# Patient Record
Sex: MALE | Race: WHITE | NOT HISPANIC OR LATINO
[De-identification: names, ages, dates, MRNs, and addresses within clinical notes are randomized per-mention and may not be internally consistent; named-entity substitution may affect disease eponyms.]

---

## 2017-03-23 ENCOUNTER — APPOINTMENT (OUTPATIENT)
Dept: CARDIOLOGY | Facility: CLINIC | Age: 72
End: 2017-03-23

## 2017-03-23 ENCOUNTER — NON-APPOINTMENT (OUTPATIENT)
Age: 72
End: 2017-03-23

## 2017-03-23 VITALS
HEIGHT: 73 IN | DIASTOLIC BLOOD PRESSURE: 92 MMHG | BODY MASS INDEX: 34.06 KG/M2 | SYSTOLIC BLOOD PRESSURE: 132 MMHG | WEIGHT: 257 LBS | HEART RATE: 65 BPM | OXYGEN SATURATION: 96 %

## 2017-06-26 ENCOUNTER — RX RENEWAL (OUTPATIENT)
Age: 72
End: 2017-06-26

## 2017-09-14 ENCOUNTER — NON-APPOINTMENT (OUTPATIENT)
Age: 72
End: 2017-09-14

## 2017-09-14 ENCOUNTER — APPOINTMENT (OUTPATIENT)
Dept: CARDIOLOGY | Facility: CLINIC | Age: 72
End: 2017-09-14
Payer: MEDICARE

## 2017-09-14 VITALS
BODY MASS INDEX: 33.8 KG/M2 | HEART RATE: 72 BPM | HEIGHT: 73 IN | RESPIRATION RATE: 14 BRPM | WEIGHT: 255 LBS | SYSTOLIC BLOOD PRESSURE: 143 MMHG | DIASTOLIC BLOOD PRESSURE: 96 MMHG | OXYGEN SATURATION: 97 %

## 2017-09-14 VITALS — DIASTOLIC BLOOD PRESSURE: 83 MMHG | SYSTOLIC BLOOD PRESSURE: 132 MMHG

## 2017-09-14 PROCEDURE — 93000 ELECTROCARDIOGRAM COMPLETE: CPT

## 2017-09-14 PROCEDURE — 99214 OFFICE O/P EST MOD 30 MIN: CPT

## 2018-01-12 ENCOUNTER — OUTPATIENT (OUTPATIENT)
Dept: OUTPATIENT SERVICES | Facility: HOSPITAL | Age: 73
LOS: 1 days | End: 2018-01-12

## 2018-01-12 ENCOUNTER — APPOINTMENT (OUTPATIENT)
Dept: CV DIAGNOSTICS | Facility: HOSPITAL | Age: 73
End: 2018-01-12
Payer: MEDICARE

## 2018-01-12 DIAGNOSIS — I25.5 ISCHEMIC CARDIOMYOPATHY: ICD-10-CM

## 2018-01-12 DIAGNOSIS — I25.10 ATHEROSCLEROTIC HEART DISEASE OF NATIVE CORONARY ARTERY WITHOUT ANGINA PECTORIS: ICD-10-CM

## 2018-01-12 PROCEDURE — 93016 CV STRESS TEST SUPVJ ONLY: CPT | Mod: GC

## 2018-01-12 PROCEDURE — 93018 CV STRESS TEST I&R ONLY: CPT | Mod: GC

## 2018-01-12 PROCEDURE — 78452 HT MUSCLE IMAGE SPECT MULT: CPT | Mod: 26

## 2018-03-15 ENCOUNTER — APPOINTMENT (OUTPATIENT)
Dept: CARDIOLOGY | Facility: CLINIC | Age: 73
End: 2018-03-15

## 2018-08-20 ENCOUNTER — RX RENEWAL (OUTPATIENT)
Age: 73
End: 2018-08-20

## 2018-12-03 ENCOUNTER — INPATIENT (INPATIENT)
Facility: HOSPITAL | Age: 73
LOS: 0 days | Discharge: LEFT BEFORE DISCHARGE | DRG: 188 | End: 2018-12-04
Attending: INTERNAL MEDICINE | Admitting: INTERNAL MEDICINE
Payer: MEDICARE

## 2018-12-03 VITALS
OXYGEN SATURATION: 94 % | DIASTOLIC BLOOD PRESSURE: 86 MMHG | WEIGHT: 244.93 LBS | RESPIRATION RATE: 20 BRPM | HEART RATE: 92 BPM | TEMPERATURE: 99 F | SYSTOLIC BLOOD PRESSURE: 151 MMHG

## 2018-12-03 DIAGNOSIS — Z90.49 ACQUIRED ABSENCE OF OTHER SPECIFIED PARTS OF DIGESTIVE TRACT: Chronic | ICD-10-CM

## 2018-12-03 LAB
ALBUMIN SERPL ELPH-MCNC: 4.2 G/DL — SIGNIFICANT CHANGE UP (ref 3.3–5)
ALP SERPL-CCNC: 81 U/L — SIGNIFICANT CHANGE UP (ref 40–120)
ALT FLD-CCNC: 36 U/L — SIGNIFICANT CHANGE UP (ref 10–45)
ANION GAP SERPL CALC-SCNC: 15 MMOL/L — SIGNIFICANT CHANGE UP (ref 5–17)
APTT BLD: 27.1 SEC — LOW (ref 27.5–36.3)
AST SERPL-CCNC: 22 U/L — SIGNIFICANT CHANGE UP (ref 10–40)
BASOPHILS # BLD AUTO: 0 K/UL — SIGNIFICANT CHANGE UP (ref 0–0.2)
BASOPHILS NFR BLD AUTO: 0.1 % — SIGNIFICANT CHANGE UP (ref 0–2)
BILIRUB SERPL-MCNC: 1.1 MG/DL — SIGNIFICANT CHANGE UP (ref 0.2–1.2)
BUN SERPL-MCNC: 13 MG/DL — SIGNIFICANT CHANGE UP (ref 7–23)
CALCIUM SERPL-MCNC: 9.9 MG/DL — SIGNIFICANT CHANGE UP (ref 8.4–10.5)
CHLORIDE SERPL-SCNC: 100 MMOL/L — SIGNIFICANT CHANGE UP (ref 96–108)
CO2 SERPL-SCNC: 23 MMOL/L — SIGNIFICANT CHANGE UP (ref 22–31)
CREAT SERPL-MCNC: 0.79 MG/DL — SIGNIFICANT CHANGE UP (ref 0.5–1.3)
EOSINOPHIL # BLD AUTO: 0 K/UL — SIGNIFICANT CHANGE UP (ref 0–0.5)
EOSINOPHIL NFR BLD AUTO: 0.3 % — SIGNIFICANT CHANGE UP (ref 0–6)
GLUCOSE SERPL-MCNC: 125 MG/DL — HIGH (ref 70–99)
HCT VFR BLD CALC: 45 % — SIGNIFICANT CHANGE UP (ref 39–50)
HGB BLD-MCNC: 15.1 G/DL — SIGNIFICANT CHANGE UP (ref 13–17)
INR BLD: 1.19 RATIO — HIGH (ref 0.88–1.16)
LYMPHOCYTES # BLD AUTO: 0.7 K/UL — LOW (ref 1–3.3)
LYMPHOCYTES # BLD AUTO: 5.4 % — LOW (ref 13–44)
MCHC RBC-ENTMCNC: 29.7 PG — SIGNIFICANT CHANGE UP (ref 27–34)
MCHC RBC-ENTMCNC: 33.6 GM/DL — SIGNIFICANT CHANGE UP (ref 32–36)
MCV RBC AUTO: 88.3 FL — SIGNIFICANT CHANGE UP (ref 80–100)
MONOCYTES # BLD AUTO: 0.9 K/UL — SIGNIFICANT CHANGE UP (ref 0–0.9)
MONOCYTES NFR BLD AUTO: 7.2 % — SIGNIFICANT CHANGE UP (ref 2–14)
NEUTROPHILS # BLD AUTO: 11.5 K/UL — HIGH (ref 1.8–7.4)
NEUTROPHILS NFR BLD AUTO: 87 % — HIGH (ref 43–77)
PLATELET # BLD AUTO: 234 K/UL — SIGNIFICANT CHANGE UP (ref 150–400)
POTASSIUM SERPL-MCNC: 3.9 MMOL/L — SIGNIFICANT CHANGE UP (ref 3.5–5.3)
POTASSIUM SERPL-SCNC: 3.9 MMOL/L — SIGNIFICANT CHANGE UP (ref 3.5–5.3)
PROT SERPL-MCNC: 7.8 G/DL — SIGNIFICANT CHANGE UP (ref 6–8.3)
PROTHROM AB SERPL-ACNC: 13.8 SEC — HIGH (ref 10–12.9)
RBC # BLD: 5.1 M/UL — SIGNIFICANT CHANGE UP (ref 4.2–5.8)
RBC # FLD: 12.9 % — SIGNIFICANT CHANGE UP (ref 10.3–14.5)
SODIUM SERPL-SCNC: 138 MMOL/L — SIGNIFICANT CHANGE UP (ref 135–145)
TROPONIN T, HIGH SENSITIVITY RESULT: 7 NG/L — SIGNIFICANT CHANGE UP (ref 0–51)
WBC # BLD: 13.2 K/UL — HIGH (ref 3.8–10.5)
WBC # FLD AUTO: 13.2 K/UL — HIGH (ref 3.8–10.5)

## 2018-12-03 PROCEDURE — 71045 X-RAY EXAM CHEST 1 VIEW: CPT | Mod: 26

## 2018-12-03 PROCEDURE — 93010 ELECTROCARDIOGRAM REPORT: CPT | Mod: GC

## 2018-12-03 PROCEDURE — 99284 EMERGENCY DEPT VISIT MOD MDM: CPT | Mod: GC,25

## 2018-12-03 PROCEDURE — 71275 CT ANGIOGRAPHY CHEST: CPT | Mod: 26

## 2018-12-03 RX ORDER — MORPHINE SULFATE 50 MG/1
4 CAPSULE, EXTENDED RELEASE ORAL ONCE
Qty: 0 | Refills: 0 | Status: DISCONTINUED | OUTPATIENT
Start: 2018-12-03 | End: 2018-12-03

## 2018-12-03 RX ORDER — DIAZEPAM 5 MG
5 TABLET ORAL ONCE
Qty: 0 | Refills: 0 | Status: DISCONTINUED | OUTPATIENT
Start: 2018-12-03 | End: 2018-12-03

## 2018-12-03 RX ORDER — OXYCODONE HYDROCHLORIDE 5 MG/1
5 TABLET ORAL ONCE
Qty: 0 | Refills: 0 | Status: DISCONTINUED | OUTPATIENT
Start: 2018-12-03 | End: 2018-12-03

## 2018-12-03 RX ORDER — KETOROLAC TROMETHAMINE 30 MG/ML
15 SYRINGE (ML) INJECTION ONCE
Qty: 0 | Refills: 0 | Status: DISCONTINUED | OUTPATIENT
Start: 2018-12-03 | End: 2018-12-03

## 2018-12-03 RX ADMIN — OXYCODONE HYDROCHLORIDE 5 MILLIGRAM(S): 5 TABLET ORAL at 20:09

## 2018-12-03 RX ADMIN — OXYCODONE HYDROCHLORIDE 5 MILLIGRAM(S): 5 TABLET ORAL at 21:22

## 2018-12-03 RX ADMIN — Medication 5 MILLIGRAM(S): at 17:47

## 2018-12-03 RX ADMIN — MORPHINE SULFATE 4 MILLIGRAM(S): 50 CAPSULE, EXTENDED RELEASE ORAL at 22:30

## 2018-12-03 RX ADMIN — MORPHINE SULFATE 4 MILLIGRAM(S): 50 CAPSULE, EXTENDED RELEASE ORAL at 21:25

## 2018-12-03 RX ADMIN — Medication 15 MILLIGRAM(S): at 22:02

## 2018-12-03 RX ADMIN — Medication 15 MILLIGRAM(S): at 22:30

## 2018-12-03 RX ADMIN — MORPHINE SULFATE 4 MILLIGRAM(S): 50 CAPSULE, EXTENDED RELEASE ORAL at 22:02

## 2018-12-03 RX ADMIN — MORPHINE SULFATE 4 MILLIGRAM(S): 50 CAPSULE, EXTENDED RELEASE ORAL at 21:45

## 2018-12-03 NOTE — ED ADULT NURSE NOTE - NSIMPLEMENTINTERV_GEN_ALL_ED
Implemented All Universal Safety Interventions:  [place] to call system. Call bell, personal items and telephone within reach. Instruct patient to call for assistance. Room bathroom lighting operational. Non-slip footwear when patient is off stretcher. Physically safe environment: no spills, clutter or unnecessary equipment. Stretcher in lowest position, wheels locked, appropriate side rails in place.

## 2018-12-03 NOTE — ED PROVIDER NOTE - MEDICAL DECISION MAKING DETAILS
Back pain severe with shortness of breath,pleuretic, ro pe/pna check labs ct chest pain control probable admit  Abdelrahman Miranda MD, Facep

## 2018-12-03 NOTE — ED ADULT NURSE NOTE - OBJECTIVE STATEMENT
73 y.o male pmh HTN, HLD, CAD presenting to ED c/o worsening back spasms, sob, and AMBROCIO since last night. pt states yesterday he began experiencing "uncontrollable spasms" in his back making him feel like he was unable to take a full deep breath resulting in him to feel sob, states that he woke up in the middle of the night and had to sit leaned over taking small quick in and out breaths in order to feel like he was able to breath. verbalizes pain upon inspiration. denies any cough, fever, dizziness weakness, cp, or recent long car or plane rides. verbalizes that he did have an episode of chills yesterday, no known fever that had resolved on its own. took a valium in the middle of the night with no relief. Vs stable. wife remains at the bedside. labs and line obtained. pt pending results and ct chest. safety maintained, call bell at the bedside and within reach.

## 2018-12-03 NOTE — ED PROVIDER NOTE - OBJECTIVE STATEMENT
73y m w distant hx colon ca, ACS s/p MI and stent placement on Plavix (not ASA), p/w midback pain and chest pain over the last week. His symptoms began w/ back pain and stiffness initially 1 wk ago; this resolved w/ valium. He began having similar back pain last night, but w/ associated SOB and CP. His chest symptoms are worse w/ inspiration. He was seen by his orthopedist today, who told him to come to the ED for a cardiac w/u due to nature of symptoms.

## 2018-12-03 NOTE — ED PROVIDER NOTE - PROGRESS NOTE DETAILS
Discussed with Dr Crenshaw RBBB is old if admit use S Rony.  Abdelrahman Miranda MD, Facep Discussed with Dr Rony Miranda MD, Facep Endorsed Dr April Miranda MD, Facep Pedro PGY2: pt with L sided pleural effusion on CT, no fevers/cough. tba for pain control

## 2018-12-03 NOTE — ED PROVIDER NOTE - PMH
CAD (coronary artery disease)  SP ptca w stent 5/2010  Colon carcinoma  Sp Ileostomy and reverasl with chemo/rt (2012)  Gout    HLD (hyperlipidemia)    HTN (Hypertension)

## 2018-12-03 NOTE — ED PROVIDER NOTE - ATTENDING CONTRIBUTION TO CARE
Private Physician Gustavo Crenshaw   73y male pmh CAD,e)  SP ptca w stent, Colon ca sp resection/chemo/rt, Gout,HTN.HLD, Pt comes to ed complains of "back spasms" past several days. Treated with valium last week with improvement. Last night symptoms worsened. Has been up most of night. Pain worse with lying flat,turning. Associated with shortness of breath. SOB had diff taking deep breath. No cough. fever, but had chills last night/resolved. No sputum/hemoptysis. Abd pain. NVDC. Seen by ortho and referred to ed. PE WDWN male looking acutley ill normocephalic atraumatic chest clear anterior & posterior abd soft +bs no mass guarding. neruo no focal defects. cv no rubs, gallops or murmurs.  Abdelrahman Miranda MD, Facep

## 2018-12-04 ENCOUNTER — INPATIENT (INPATIENT)
Facility: HOSPITAL | Age: 73
LOS: 3 days | Discharge: HOME | End: 2018-12-08
Attending: HOSPITALIST | Admitting: HOSPITALIST
Payer: MEDICARE

## 2018-12-04 VITALS
TEMPERATURE: 98 F | DIASTOLIC BLOOD PRESSURE: 95 MMHG | OXYGEN SATURATION: 95 % | RESPIRATION RATE: 20 BRPM | SYSTOLIC BLOOD PRESSURE: 150 MMHG | HEART RATE: 98 BPM

## 2018-12-04 VITALS
DIASTOLIC BLOOD PRESSURE: 73 MMHG | HEART RATE: 100 BPM | SYSTOLIC BLOOD PRESSURE: 128 MMHG | RESPIRATION RATE: 22 BRPM | OXYGEN SATURATION: 99 % | TEMPERATURE: 98 F

## 2018-12-04 DIAGNOSIS — I50.9 HEART FAILURE, UNSPECIFIED: ICD-10-CM

## 2018-12-04 DIAGNOSIS — J90 PLEURAL EFFUSION, NOT ELSEWHERE CLASSIFIED: ICD-10-CM

## 2018-12-04 DIAGNOSIS — E87.79 OTHER FLUID OVERLOAD: ICD-10-CM

## 2018-12-04 DIAGNOSIS — Z90.49 ACQUIRED ABSENCE OF OTHER SPECIFIED PARTS OF DIGESTIVE TRACT: Chronic | ICD-10-CM

## 2018-12-04 LAB
ALBUMIN SERPL ELPH-MCNC: 4 G/DL — SIGNIFICANT CHANGE UP (ref 3.3–5)
ALP SERPL-CCNC: 83 U/L — SIGNIFICANT CHANGE UP (ref 40–120)
ALT FLD-CCNC: 31 U/L — SIGNIFICANT CHANGE UP (ref 4–41)
APPEARANCE UR: SIGNIFICANT CHANGE UP
APTT BLD: 31.2 SEC — SIGNIFICANT CHANGE UP (ref 27.5–36.3)
AST SERPL-CCNC: 18 U/L — SIGNIFICANT CHANGE UP (ref 4–40)
BACTERIA # UR AUTO: HIGH
BASE EXCESS BLDV CALC-SCNC: 2.6 MMOL/L — SIGNIFICANT CHANGE UP
BASOPHILS # BLD AUTO: 0.02 K/UL — SIGNIFICANT CHANGE UP (ref 0–0.2)
BASOPHILS NFR BLD AUTO: 0.1 % — SIGNIFICANT CHANGE UP (ref 0–2)
BILIRUB SERPL-MCNC: 1.1 MG/DL — SIGNIFICANT CHANGE UP (ref 0.2–1.2)
BILIRUB UR-MCNC: NEGATIVE — SIGNIFICANT CHANGE UP
BLOOD GAS VENOUS - CREATININE: 0.71 MG/DL — SIGNIFICANT CHANGE UP (ref 0.5–1.3)
BLOOD UR QL VISUAL: SIGNIFICANT CHANGE UP
BUN SERPL-MCNC: 12 MG/DL — SIGNIFICANT CHANGE UP (ref 7–23)
CALCIUM SERPL-MCNC: 9.9 MG/DL — SIGNIFICANT CHANGE UP (ref 8.4–10.5)
CHLORIDE BLDV-SCNC: 104 MMOL/L — SIGNIFICANT CHANGE UP (ref 96–108)
CHLORIDE SERPL-SCNC: 99 MMOL/L — SIGNIFICANT CHANGE UP (ref 98–107)
CO2 SERPL-SCNC: 23 MMOL/L — SIGNIFICANT CHANGE UP (ref 22–31)
COLOR SPEC: YELLOW — SIGNIFICANT CHANGE UP
CREAT SERPL-MCNC: 0.82 MG/DL — SIGNIFICANT CHANGE UP (ref 0.5–1.3)
D DIMER BLD IA.RAPID-MCNC: 592 NG/ML — SIGNIFICANT CHANGE UP
EOSINOPHIL # BLD AUTO: 0.03 K/UL — SIGNIFICANT CHANGE UP (ref 0–0.5)
EOSINOPHIL NFR BLD AUTO: 0.2 % — SIGNIFICANT CHANGE UP (ref 0–6)
GAS PNL BLDV: 136 MMOL/L — SIGNIFICANT CHANGE UP (ref 136–146)
GLUCOSE BLDV-MCNC: 127 — HIGH (ref 70–99)
GLUCOSE SERPL-MCNC: 122 MG/DL — HIGH (ref 70–99)
GLUCOSE UR-MCNC: NEGATIVE — SIGNIFICANT CHANGE UP
HCO3 BLDV-SCNC: 25 MMOL/L — SIGNIFICANT CHANGE UP (ref 20–27)
HCT VFR BLD CALC: 45.4 % — SIGNIFICANT CHANGE UP (ref 39–50)
HCT VFR BLDV CALC: 46 % — SIGNIFICANT CHANGE UP (ref 39–51)
HGB BLD-MCNC: 14.7 G/DL — SIGNIFICANT CHANGE UP (ref 13–17)
HGB BLDV-MCNC: 15 G/DL — SIGNIFICANT CHANGE UP (ref 13–17)
HYALINE CASTS # UR AUTO: NEGATIVE — SIGNIFICANT CHANGE UP
IMM GRANULOCYTES # BLD AUTO: 0.07 # — SIGNIFICANT CHANGE UP
IMM GRANULOCYTES NFR BLD AUTO: 0.5 % — SIGNIFICANT CHANGE UP (ref 0–1.5)
INR BLD: 1.21 — HIGH (ref 0.88–1.17)
KETONES UR-MCNC: SIGNIFICANT CHANGE UP
LACTATE BLDV-MCNC: 1.7 MMOL/L — SIGNIFICANT CHANGE UP (ref 0.5–2)
LEUKOCYTE ESTERASE UR-ACNC: SIGNIFICANT CHANGE UP
LYMPHOCYTES # BLD AUTO: 0.77 K/UL — LOW (ref 1–3.3)
LYMPHOCYTES # BLD AUTO: 5.3 % — LOW (ref 13–44)
MCHC RBC-ENTMCNC: 28.7 PG — SIGNIFICANT CHANGE UP (ref 27–34)
MCHC RBC-ENTMCNC: 32.4 % — SIGNIFICANT CHANGE UP (ref 32–36)
MCV RBC AUTO: 88.7 FL — SIGNIFICANT CHANGE UP (ref 80–100)
MONOCYTES # BLD AUTO: 1.24 K/UL — HIGH (ref 0–0.9)
MONOCYTES NFR BLD AUTO: 8.5 % — SIGNIFICANT CHANGE UP (ref 2–14)
NEUTROPHILS # BLD AUTO: 12.53 K/UL — HIGH (ref 1.8–7.4)
NEUTROPHILS NFR BLD AUTO: 85.4 % — HIGH (ref 43–77)
NITRITE UR-MCNC: POSITIVE — HIGH
NRBC # FLD: 0 — SIGNIFICANT CHANGE UP
NT-PROBNP SERPL-SCNC: 188.1 PG/ML — SIGNIFICANT CHANGE UP
PCO2 BLDV: 42 MMHG — SIGNIFICANT CHANGE UP (ref 41–51)
PH BLDV: 7.42 PH — SIGNIFICANT CHANGE UP (ref 7.32–7.43)
PH UR: 6 — SIGNIFICANT CHANGE UP (ref 5–8)
PLATELET # BLD AUTO: 267 K/UL — SIGNIFICANT CHANGE UP (ref 150–400)
PMV BLD: 9.6 FL — SIGNIFICANT CHANGE UP (ref 7–13)
PO2 BLDV: 22 MMHG — LOW (ref 35–40)
POTASSIUM BLDV-SCNC: 3.9 MMOL/L — SIGNIFICANT CHANGE UP (ref 3.4–4.5)
POTASSIUM SERPL-MCNC: 3.9 MMOL/L — SIGNIFICANT CHANGE UP (ref 3.5–5.3)
POTASSIUM SERPL-SCNC: 3.9 MMOL/L — SIGNIFICANT CHANGE UP (ref 3.5–5.3)
PROT SERPL-MCNC: 7.6 G/DL — SIGNIFICANT CHANGE UP (ref 6–8.3)
PROT UR-MCNC: 70 — SIGNIFICANT CHANGE UP
PROTHROM AB SERPL-ACNC: 13.5 SEC — HIGH (ref 9.8–13.1)
RBC # BLD: 5.12 M/UL — SIGNIFICANT CHANGE UP (ref 4.2–5.8)
RBC # FLD: 13.9 % — SIGNIFICANT CHANGE UP (ref 10.3–14.5)
RBC CASTS # UR COMP ASSIST: SIGNIFICANT CHANGE UP (ref 0–?)
SAO2 % BLDV: 36.1 % — LOW (ref 60–85)
SODIUM SERPL-SCNC: 137 MMOL/L — SIGNIFICANT CHANGE UP (ref 135–145)
SP GR SPEC: 1.03 — SIGNIFICANT CHANGE UP (ref 1–1.04)
SQUAMOUS # UR AUTO: SIGNIFICANT CHANGE UP
TROPONIN T, HIGH SENSITIVITY RESULT: 9 NG/L — SIGNIFICANT CHANGE UP (ref 0–51)
TROPONIN T, HIGH SENSITIVITY: 11 NG/L — SIGNIFICANT CHANGE UP (ref ?–14)
UROBILINOGEN FLD QL: NORMAL — SIGNIFICANT CHANGE UP
WBC # BLD: 14.66 K/UL — HIGH (ref 3.8–10.5)
WBC # FLD AUTO: 14.66 K/UL — HIGH (ref 3.8–10.5)
WBC UR QL: >50 — HIGH (ref 0–?)

## 2018-12-04 PROCEDURE — 85610 PROTHROMBIN TIME: CPT

## 2018-12-04 PROCEDURE — 71046 X-RAY EXAM CHEST 2 VIEWS: CPT | Mod: 26

## 2018-12-04 PROCEDURE — 85730 THROMBOPLASTIN TIME PARTIAL: CPT

## 2018-12-04 PROCEDURE — 71275 CT ANGIOGRAPHY CHEST: CPT

## 2018-12-04 PROCEDURE — 93005 ELECTROCARDIOGRAM TRACING: CPT

## 2018-12-04 PROCEDURE — 80053 COMPREHEN METABOLIC PANEL: CPT

## 2018-12-04 PROCEDURE — 96374 THER/PROPH/DIAG INJ IV PUSH: CPT | Mod: XU

## 2018-12-04 PROCEDURE — 71045 X-RAY EXAM CHEST 1 VIEW: CPT

## 2018-12-04 PROCEDURE — 84484 ASSAY OF TROPONIN QUANT: CPT

## 2018-12-04 PROCEDURE — 96376 TX/PRO/DX INJ SAME DRUG ADON: CPT | Mod: XU

## 2018-12-04 PROCEDURE — 85027 COMPLETE CBC AUTOMATED: CPT

## 2018-12-04 PROCEDURE — 71275 CT ANGIOGRAPHY CHEST: CPT | Mod: 26

## 2018-12-04 PROCEDURE — 96375 TX/PRO/DX INJ NEW DRUG ADDON: CPT | Mod: XU

## 2018-12-04 PROCEDURE — 99285 EMERGENCY DEPT VISIT HI MDM: CPT | Mod: 25

## 2018-12-04 RX ORDER — ASPIRIN/CALCIUM CARB/MAGNESIUM 324 MG
81 TABLET ORAL DAILY
Qty: 0 | Refills: 0 | Status: DISCONTINUED | OUTPATIENT
Start: 2018-12-04 | End: 2018-12-04

## 2018-12-04 RX ORDER — CYCLOBENZAPRINE HYDROCHLORIDE 10 MG/1
5 TABLET, FILM COATED ORAL
Qty: 0 | Refills: 0 | Status: DISCONTINUED | OUTPATIENT
Start: 2018-12-04 | End: 2018-12-04

## 2018-12-04 RX ORDER — SENNA PLUS 8.6 MG/1
2 TABLET ORAL AT BEDTIME
Qty: 0 | Refills: 0 | Status: DISCONTINUED | OUTPATIENT
Start: 2018-12-04 | End: 2018-12-04

## 2018-12-04 RX ORDER — SIMVASTATIN 20 MG/1
20 TABLET, FILM COATED ORAL AT BEDTIME
Qty: 0 | Refills: 0 | Status: DISCONTINUED | OUTPATIENT
Start: 2018-12-04 | End: 2018-12-04

## 2018-12-04 RX ORDER — CEFTRIAXONE 500 MG/1
1 INJECTION, POWDER, FOR SOLUTION INTRAMUSCULAR; INTRAVENOUS ONCE
Qty: 0 | Refills: 0 | Status: COMPLETED | OUTPATIENT
Start: 2018-12-04 | End: 2018-12-04

## 2018-12-04 RX ORDER — HYDROMORPHONE HYDROCHLORIDE 2 MG/ML
1 INJECTION INTRAMUSCULAR; INTRAVENOUS; SUBCUTANEOUS EVERY 8 HOURS
Qty: 0 | Refills: 0 | Status: DISCONTINUED | OUTPATIENT
Start: 2018-12-04 | End: 2018-12-04

## 2018-12-04 RX ORDER — AZITHROMYCIN 500 MG/1
500 TABLET, FILM COATED ORAL ONCE
Qty: 0 | Refills: 0 | Status: COMPLETED | OUTPATIENT
Start: 2018-12-04 | End: 2018-12-04

## 2018-12-04 RX ORDER — SODIUM CHLORIDE 9 MG/ML
1000 INJECTION INTRAMUSCULAR; INTRAVENOUS; SUBCUTANEOUS ONCE
Qty: 0 | Refills: 0 | Status: COMPLETED | OUTPATIENT
Start: 2018-12-04 | End: 2018-12-04

## 2018-12-04 RX ORDER — HYDROMORPHONE HYDROCHLORIDE 2 MG/ML
0.5 INJECTION INTRAMUSCULAR; INTRAVENOUS; SUBCUTANEOUS ONCE
Qty: 0 | Refills: 0 | Status: DISCONTINUED | OUTPATIENT
Start: 2018-12-04 | End: 2018-12-04

## 2018-12-04 RX ORDER — HYDROMORPHONE HYDROCHLORIDE 2 MG/ML
0.5 INJECTION INTRAMUSCULAR; INTRAVENOUS; SUBCUTANEOUS EVERY 6 HOURS
Qty: 0 | Refills: 0 | Status: DISCONTINUED | OUTPATIENT
Start: 2018-12-04 | End: 2018-12-04

## 2018-12-04 RX ORDER — FUROSEMIDE 40 MG
40 TABLET ORAL ONCE
Qty: 0 | Refills: 0 | Status: COMPLETED | OUTPATIENT
Start: 2018-12-04 | End: 2018-12-04

## 2018-12-04 RX ORDER — METOPROLOL TARTRATE 50 MG
25 TABLET ORAL
Qty: 0 | Refills: 0 | Status: DISCONTINUED | OUTPATIENT
Start: 2018-12-04 | End: 2018-12-04

## 2018-12-04 RX ADMIN — Medication 40 MILLIGRAM(S): at 21:19

## 2018-12-04 RX ADMIN — CEFTRIAXONE 100 GRAM(S): 500 INJECTION, POWDER, FOR SOLUTION INTRAMUSCULAR; INTRAVENOUS at 23:01

## 2018-12-04 NOTE — ED PROVIDER NOTE - MEDICAL DECISION MAKING DETAILS
EKG, labs including trop, pro-bnp, dimer (negative CTA chest yesterday but limited by motion and contrast filling) still high clinical suspicion for PE given tachycardia, RBBB and significant SOB at rest. Impression:  CHF vs ACS,  although PE is still on the differential, he was r/o for large PE yesterday with sub-optimal CTA in the [facility] ED.  If ddimer is negative tonight, we will not pursue the diagnosis further with repeat CTA chest.  Anticipate elevated pro-bnp.  Primary cardiologist, Dr. Adal Long, admits to hospitalist with cardiology consulting.

## 2018-12-04 NOTE — H&P ADULT - HISTORY OF PRESENT ILLNESS
High Risk Travel:  International Travel? No(1)    73y Male complaining of back pain general.	   73y m w distant hx colon ca,.   ACS s/p MI and stent placement on Plavix (not ASA),  p/w midback pain and chest pain over the last week.  His symptoms began w/ back pain and stiffness initially 1 wk ago; this resolved w/ valium. He began having similar back pain last night, but w/ associated SOB and CP. His chest symptoms are worse w/ inspiration. He was seen by his orthopedist today, who told him to come to the ED for a cardiac w/u due to nature of symptoms.

## 2018-12-04 NOTE — CHART NOTE - NSCHARTNOTEFT_GEN_A_CORE
CHARLINE LAFLEUR  73y, Male     Chief c/o:    This is     with past medical History of     Vital Signs:  Vital Signs Last 24 Hrs  T(C): 36.5 (04 Dec 2018 07:15), Max: 37.1 (03 Dec 2018 15:24)  T(F): 97.7 (04 Dec 2018 07:15), Max: 98.7 (03 Dec 2018 15:24)  HR: 98 (04 Dec 2018 07:15) (78 - 99)  BP: 150/95 (04 Dec 2018 07:15) (118/79 - 157/92)  BP(mean): --  RR: 20 (04 Dec 2018 07:15) (18 - 20)  SpO2: 95% (04 Dec 2018 07:15) (94% - 100%)    Labs:                        15.1   13.2  )-----------( 234      ( 03 Dec 2018 17:38 )             45.0     CBC Full  -  ( 03 Dec 2018 17:38 )  WBC Count : 13.2 K/uL  Hemoglobin : 15.1 g/dL  Hematocrit : 45.0 %  Platelet Count - Automated : 234 K/uL  Mean Cell Volume : 88.3 fl  Mean Cell Hemoglobin : 29.7 pg  Mean Cell Hemoglobin Concentration : 33.6 gm/dL  Auto Neutrophil # : 11.5 K/uL  Auto Lymphocyte # : 0.7 K/uL  Auto Monocyte # : 0.9 K/uL  Auto Eosinophil # : 0.0 K/uL  Auto Basophil # : 0.0 K/uL  Auto Neutrophil % : 87.0 %  Auto Lymphocyte % : 5.4 %  Auto Monocyte % : 7.2 %  Auto Eosinophil % : 0.3 %  Auto Basophil % : 0.1 %    12-03    138  |  100  |  13  ----------------------------<  125<H>  3.9   |  23  |  0.79    Ca    9.9      03 Dec 2018 17:38    TPro  7.8  /  Alb  4.2  /  TBili  1.1  /  DBili  x   /  AST  22  /  ALT  36  /  AlkPhos  81  12-03        LIVER FUNCTIONS - ( 03 Dec 2018 17:38 )  Alb: 4.2 g/dL / Pro: 7.8 g/dL / ALK PHOS: 81 U/L / ALT: 36 U/L / AST: 22 U/L / GGT: x             Adult Advanced Hemodynamics Last 24 Hrs  CVP(mm Hg): --  CVP(cm H2O): --  CO: --  CI: --  PA: --  PA(mean): --  PCWP: --  SVR: --  SVRI: --  PVR: --  PVRI: --  PT/INR - ( 03 Dec 2018 17:38 )   PT: 13.8 sec;   INR: 1.19 ratio         PTT - ( 03 Dec 2018 17:38 )  PTT:27.1 sec    Physical Exam:  PHYSICAL EXAM:      Constitutional:    Eyes:    ENMT:    Neck:    Breasts:    Back:    Respiratory:    Cardiovascular:    Gastrointestinal:    Genitourinary:    Rectal:    Extremities:    Vascular:    Neurological:    Skin:    Lymph Nodes:    Musculoskeletal:    Psychiatric:        Assesment / Plan: CHARLINE LAFLEUR  73y, Male     Chief c/o sob , back PAIN        Vital Signs:  Vital Signs Last 24 Hrs  T(C): 36.5 (04 Dec 2018 07:15), Max: 37.1 (03 Dec 2018 15:24)  T(F): 97.7 (04 Dec 2018 07:15), Max: 98.7 (03 Dec 2018 15:24)  HR: 98 (04 Dec 2018 07:15) (78 - 99)  BP: 150/95 (04 Dec 2018 07:15) (118/79 - 157/92)  BP(mean): --  RR: 20 (04 Dec 2018 07:15) (18 - 20)  SpO2: 95% (04 Dec 2018 07:15) (94% - 100%)    Labs:                        15.1   13.2  )-----------( 234      ( 03 Dec 2018 17:38 )             45.0     CBC Full  -  ( 03 Dec 2018 17:38 )  WBC Count : 13.2 K/uL  Hemoglobin : 15.1 g/dL  Hematocrit : 45.0 %  Platelet Count - Automated : 234 K/uL  Mean Cell Volume : 88.3 fl  Mean Cell Hemoglobin : 29.7 pg  Mean Cell Hemoglobin Concentration : 33.6 gm/dL  Auto Neutrophil # : 11.5 K/uL  Auto Lymphocyte # : 0.7 K/uL  Auto Monocyte # : 0.9 K/uL  Auto Eosinophil # : 0.0 K/uL  Auto Basophil # : 0.0 K/uL  Auto Neutrophil % : 87.0 %  Auto Lymphocyte % : 5.4 %  Auto Monocyte % : 7.2 %  Auto Eosinophil % : 0.3 %  Auto Basophil % : 0.1 %    12-03    138  |  100  |  13  ----------------------------<  125<H>  3.9   |  23  |  0.79    Ca    9.9      03 Dec 2018 17:38    TPro  7.8  /  Alb  4.2  /  TBili  1.1  /  DBili  x   /  AST  22  /  ALT  36  /  AlkPhos  81  12-03        LIVER FUNCTIONS - ( 03 Dec 2018 17:38 )  Alb: 4.2 g/dL / Pro: 7.8 g/dL / ALK PHOS: 81 U/L / ALT: 36 U/L / AST: 22 U/L / GGT: x             Adult Advanced Hemodynamics Last 24 Hrs  CVP(mm Hg): --  CVP(cm H2O): --  CO: --  CI: --  PA: --  PA(mean): --  PCWP: --  SVR: --  SVRI: --  PVR: --  PVRI: --  PT/INR - ( 03 Dec 2018 17:38 )   PT: 13.8 sec;   INR: 1.19 ratio         PTT - ( 03 Dec 2018 17:38 )  PTT:27.1 sec    Physical Exam:  PHYSICAL EXAM:      Constitutional:    Eyes:    ENMT:    Neck:    Breasts:    Back:    Respiratory:    Cardiovascular:    Gastrointestinal:    Genitourinary:    Rectal:    Extremities:    Vascular:    Neurological:    Skin:    Lymph Nodes:    Musculoskeletal:    Psychiatric:        Assesment / Plan: CHARLINE LAFLEUR  73y m w distant hx colon ca,.   ACS s/p MI and stent placement on Plavix (not ASA),   p/w midback pain and chest pain over the last week.   His symptoms began w/ back pain and stiffness initially 1 wk ago; this resolved w/ valium. He began having similar back pain last night, but w/ associated SOB and CP. His chest symptoms are worse w/ inspiration. He was seen by his orthopedist today, who told him to come to the ED for a cardiac w/u due to nature of symptoms.        Chief c/o sob , back PAIN        Vital Signs:  Vital Signs Last 24 Hrs  T(C): 36.5 (04 Dec 2018 07:15), Max: 37.1 (03 Dec 2018 15:24)  T(F): 97.7 (04 Dec 2018 07:15), Max: 98.7 (03 Dec 2018 15:24)  HR: 98 (04 Dec 2018 07:15) (78 - 99)  BP: 150/95 (04 Dec 2018 07:15) (118/79 - 157/92)  BP(mean): --  RR: 20 (04 Dec 2018 07:15) (18 - 20)  SpO2: 95% (04 Dec 2018 07:15) (94% - 100%)    Labs:                        15.1   13.2  )-----------( 234      ( 03 Dec 2018 17:38 )             45.0     CBC Full  -  ( 03 Dec 2018 17:38 )  WBC Count : 13.2 K/uL  Hemoglobin : 15.1 g/dL  Hematocrit : 45.0 %  Platelet Count - Automated : 234 K/uL  Mean Cell Volume : 88.3 fl  Mean Cell Hemoglobin : 29.7 pg  Mean Cell Hemoglobin Concentration : 33.6 gm/dL  Auto Neutrophil # : 11.5 K/uL  Auto Lymphocyte # : 0.7 K/uL  Auto Monocyte # : 0.9 K/uL  Auto Eosinophil # : 0.0 K/uL  Auto Basophil # : 0.0 K/uL  Auto Neutrophil % : 87.0 %  Auto Lymphocyte % : 5.4 %  Auto Monocyte % : 7.2 %  Auto Eosinophil % : 0.3 %  Auto Basophil % : 0.1 %    12-03    138  |  100  |  13  ----------------------------<  125<H>  3.9   |  23  |  0.79    Ca    9.9      03 Dec 2018 17:38    TPro  7.8  /  Alb  4.2  /  TBili  1.1  /  DBili  x   /  AST  22  /  ALT  36  /  AlkPhos  81  12-03        LIVER FUNCTIONS - ( 03 Dec 2018 17:38 )  Alb: 4.2 g/dL / Pro: 7.8 g/dL / ALK PHOS: 81 U/L / ALT: 36 U/L / AST: 22 U/L / GGT: x                            Assesment / Plan:  73y m w distant hx colon ca,.   ACS s/p MI and stent placement on Plavix (not ASA),   p/w midback pain and chest pain over the last week.   His symptoms began w/ back pain and stiffness initially 1 wk ago; this resolved w/ valium. He began having similar back pain last night, but w/ associated SOB and CP. His chest symptoms are worse w/ inspiration. He was seen by his orthopedist today, who told him to come to the ED for a cardiac w/u due to nature of symptoms.  PT refuse MRI of spine explain risk and benefit to pt  D/w dr Ada Ferrell RPA- C CHARLINE LAFLEUR  73y m w distant hx colon ca,.   ACS s/p MI and stent placement on Plavix (not ASA),   p/w midback pain and chest pain over the last week.   His symptoms began w/ back pain and stiffness initially 1 wk ago; this resolved w/ valium. He began having similar back pain last night, but w/ associated SOB and CP. His chest symptoms are worse w/ inspiration. He was seen by his orthopedist today, who told him to come to the ED for a cardiac w/u due to nature of symptoms.        Chief c/o sob , back PAIN        Vital Signs:  Vital Signs Last 24 Hrs  T(C): 36.5 (04 Dec 2018 07:15), Max: 37.1 (03 Dec 2018 15:24)  T(F): 97.7 (04 Dec 2018 07:15), Max: 98.7 (03 Dec 2018 15:24)  HR: 98 (04 Dec 2018 07:15) (78 - 99)  BP: 150/95 (04 Dec 2018 07:15) (118/79 - 157/92)  BP(mean): --  RR: 20 (04 Dec 2018 07:15) (18 - 20)  SpO2: 95% (04 Dec 2018 07:15) (94% - 100%)    Labs:                        15.1   13.2  )-----------( 234      ( 03 Dec 2018 17:38 )             45.0     CBC Full  -  ( 03 Dec 2018 17:38 )  WBC Count : 13.2 K/uL  Hemoglobin : 15.1 g/dL  Hematocrit : 45.0 %  Platelet Count - Automated : 234 K/uL  Mean Cell Volume : 88.3 fl  Mean Cell Hemoglobin : 29.7 pg  Mean Cell Hemoglobin Concentration : 33.6 gm/dL  Auto Neutrophil # : 11.5 K/uL  Auto Lymphocyte # : 0.7 K/uL  Auto Monocyte # : 0.9 K/uL  Auto Eosinophil # : 0.0 K/uL  Auto Basophil # : 0.0 K/uL  Auto Neutrophil % : 87.0 %  Auto Lymphocyte % : 5.4 %  Auto Monocyte % : 7.2 %  Auto Eosinophil % : 0.3 %  Auto Basophil % : 0.1 %    12-03    138  |  100  |  13  ----------------------------<  125<H>  3.9   |  23  |  0.79    Ca    9.9      03 Dec 2018 17:38    TPro  7.8  /  Alb  4.2  /  TBili  1.1  /  DBili  x   /  AST  22  /  ALT  36  /  AlkPhos  81  12-03        LIVER FUNCTIONS - ( 03 Dec 2018 17:38 )  Alb: 4.2 g/dL / Pro: 7.8 g/dL / ALK PHOS: 81 U/L / ALT: 36 U/L / AST: 22 U/L / GGT: x                            Assesment / Plan:  73y m w distant hx colon ca,.   ACS s/p MI and stent placement on Plavix (not ASA),   p/w midback pain and chest pain over the last week.   His symptoms began w/ back pain and stiffness initially 1 wk ago; this resolved w/ valium. He began having similar back pain last night, but w/ associated SOB and CP. His chest symptoms are worse w/ inspiration. He was seen by his orthopedist today, who told him to come to the ED for a cardiac w/u due to nature of symptoms.  PT refuse MRI of spine explain risk and benefit to pt. pt was insisting with going with wheelchair to the door enter ence , escorted by RN to car , he called car service. and left he refuse all treatment explain again risk not having treatment   he state he will visit his PMD today  D/w dr Ada Ferrell RPA- C CHARLINE LAFLEUR  73y m w distant hx colon ca,.   ACS s/p MI and stent placement on Plavix (not ASA),   p/w midback pain and chest pain over the last week.   His symptoms began w/ back pain and stiffness initially 1 wk ago; this resolved w/ valium. He began having similar back pain last night, but w/ associated SOB and CP. His chest symptoms are worse w/ inspiration. He was seen by his orthopedist today, who told him to come to the ED for a cardiac w/u due to nature of symptoms. currently he has no chest pain        Chief c/o sob , back PAIN        Vital Signs:  Vital Signs Last 24 Hrs  T(C): 36.5 (04 Dec 2018 07:15), Max: 37.1 (03 Dec 2018 15:24)  T(F): 97.7 (04 Dec 2018 07:15), Max: 98.7 (03 Dec 2018 15:24)  HR: 98 (04 Dec 2018 07:15) (78 - 99)  BP: 150/95 (04 Dec 2018 07:15) (118/79 - 157/92)  BP(mean): --  RR: 20 (04 Dec 2018 07:15) (18 - 20)  SpO2: 95% (04 Dec 2018 07:15) (94% - 100%)    Labs:                        15.1   13.2  )-----------( 234      ( 03 Dec 2018 17:38 )             45.0     CBC Full  -  ( 03 Dec 2018 17:38 )  WBC Count : 13.2 K/uL  Hemoglobin : 15.1 g/dL  Hematocrit : 45.0 %  Platelet Count - Automated : 234 K/uL  Mean Cell Volume : 88.3 fl  Mean Cell Hemoglobin : 29.7 pg  Mean Cell Hemoglobin Concentration : 33.6 gm/dL  Auto Neutrophil # : 11.5 K/uL  Auto Lymphocyte # : 0.7 K/uL  Auto Monocyte # : 0.9 K/uL  Auto Eosinophil # : 0.0 K/uL  Auto Basophil # : 0.0 K/uL  Auto Neutrophil % : 87.0 %  Auto Lymphocyte % : 5.4 %  Auto Monocyte % : 7.2 %  Auto Eosinophil % : 0.3 %  Auto Basophil % : 0.1 %    12-03    138  |  100  |  13  ----------------------------<  125<H>  3.9   |  23  |  0.79    Ca    9.9      03 Dec 2018 17:38    TPro  7.8  /  Alb  4.2  /  TBili  1.1  /  DBili  x   /  AST  22  /  ALT  36  /  AlkPhos  81  12-03        LIVER FUNCTIONS - ( 03 Dec 2018 17:38 )  Alb: 4.2 g/dL / Pro: 7.8 g/dL / ALK PHOS: 81 U/L / ALT: 36 U/L / AST: 22 U/L / GGT: x                            Assesment / Plan:  73y m w distant hx colon ca,.   ACS s/p MI and stent placement on Plavix (not ASA),   p/w midback pain and chest pain over the last week.   His symptoms began w/ back pain and stiffness initially 1 wk ago; this resolved w/ valium. He began having similar back pain last night, but w/ associated SOB and CP. His chest symptoms are worse w/ inspiration. He was seen by his orthopedist today, who told him to come to the ED for a cardiac w/u due to nature of symptoms.  PT refuse MRI of spine explain risk and benefit to pt. pt was insisting with going with wheelchair to the door enter ence , escorted by RN to car , he called car service. and left he refuse all treatment explain again risk not having treatment   he state he will visit his PMD today  D/w dr Ada Ferrell RPA- C

## 2018-12-04 NOTE — ED PROVIDER NOTE - CARE PLAN
Principal Discharge DX:	Other hypervolemia Principal Discharge DX:	Other hypervolemia  Secondary Diagnosis:	UTI (urinary tract infection)  Secondary Diagnosis:	Back pain  Secondary Diagnosis:	Chest pain

## 2018-12-04 NOTE — ED PROVIDER NOTE - OBJECTIVE STATEMENT
74 yo male with PMH of MI s/p stent (Dr. Adal Long) on Plavix but not aspirin, remote hx colon ca, ACS s/p MI and stent placement on Plavix (not ASA), p/w midback pain and chest pain over the last week. Pt accompanied by his wife in the ED today. Pt states his symptoms began w/ mid and lower back pain and stiffness initially 1 wk ago that temporarily resolved w/ valium. He began having similar back pain two nights ago, but w/ associated SOB and CP and orthopnea. His chest symptoms are worse w/ inspiration. Pt tachycardic in the ED to 110's, EKG unchanged from previous. Of note, pt seen at the [facility] ED yesterday, admitted for ACS w/u and AMA'd when he was told the admitting team wanted an MR of his spine because he was concerned he would not be able to tolerate MRI study 2/2 back pain lying flat. CTA chest at [facility] negative for PE but study limited by motion and contrast filling per their report. CXR showing lower lobe effusion.

## 2018-12-04 NOTE — ED ADULT NURSE REASSESSMENT NOTE - NS ED NURSE REASSESS COMMENT FT1
patient is resting in bed comfortably at this time awaiting dispo. nad, will continue to monitor.
Report given to Holding RN Lavonne Lynn. Pt aware of RN re-assignment and that they are still awaiting bed upstairs. Pt AAOx4, NAD, resp nonlabored, resting comfortably in bed, no changes observed, no pain at this time. Safety maintained. Pt transported to [facility].
Report received from Yessica GOLDSTEIN. Pt AAOx4, NAD, resp nonlabored, skin warm/dry, resting comfortably in bed. Pt c/o intermittent SOB, no L upper back pain at this time. Pt denies headache, dizziness, chest pain, palpitations, fevers, chills, weakness at this time. Pt awaiting bed. Safety maintained.

## 2018-12-04 NOTE — ED ADULT NURSE REASSESSMENT NOTE - COMFORT CARE
warm blanket provided/po fluids offered/repositioned/plan of care explained
plan of care explained/darkened lights/repositioned/warm blanket provided/po fluids offered

## 2018-12-04 NOTE — CONSULT NOTE ADULT - ATTENDING COMMENTS
Does not appear significantly fluid overloaded, does have some abdominal distention and decreased breath sounds Lower left lung, noted small pleural effusion on CT  Would suggest another dose of 40 IV lasix today  Follow up TTE.

## 2018-12-04 NOTE — H&P ADULT - NSHPREVIEWOFSYSTEMS_GEN_ALL_CORE
REVIEW OF SYSTEMS:  GEN: no fever,    no chills  RESP: no SOB,   no cough  CVS: chest pain,   no palpitations  GI: no abdominal pain,   no nausea,   no vomiting,   no constipation,   no diarrhea  : no dysuria,   no frequency  NEURO: no headache,   no dizziness  PSYCH: no depression,   not anxious  Derm : no rash

## 2018-12-04 NOTE — CONSULT NOTE ADULT - PROBLEM SELECTOR RECOMMENDATION 9
- patient received Lasix 40mg IVP x1 for  HF  - Currently on clinical exam ,trace pedal edema and abdomen distension   - c/w home meds    - echo in am

## 2018-12-04 NOTE — H&P ADULT - NSHPPHYSICALEXAM_GEN_ALL_CORE
PHYSICAL EXAMINATION:  Vital Signs Last 24 Hrs  T(C): 36.7 (03 Dec 2018 18:14), Max: 37.1 (03 Dec 2018 15:24)  T(F): 98.1 (03 Dec 2018 18:14), Max: 98.7 (03 Dec 2018 15:24)  HR: 78 (04 Dec 2018 05:24) (78 - 99)  BP: 140/90 (04 Dec 2018 05:24) (118/79 - 157/92)  BP(mean): --  RR: 18 (04 Dec 2018 05:24) (18 - 20)  SpO2: 100% (04 Dec 2018 05:24) (94% - 100%)  CAPILLARY BLOOD GLUCOSE            GENERAL: NAD, well-groomed,  HEAD:  atraumatic, normocephalic  EYES: sclera anicteric  ENMT: mucous membranes moist  NECK: supple, No JVD  CHEST/LUNG: clear to auscultation bilaterally;    no      rales   ,   no rhonchi,   HEART: normal S1, S2  ABDOMEN: BS+, soft, ND, NT   EXTREMITIES:    no    edema    b/l LEs  NEURO: awake, ,     moves all extremities  SKIN: no     rash

## 2018-12-04 NOTE — ED ADULT NURSE NOTE - OBJECTIVE STATEMENT
Patient presented to ED with c/o SOB, CP, and lower mid back spasms x 1 week, with Advil taken at home and symptoms relieved x 3 days, symptoms then resumed and he went to [facility] where a CT scan was performed and MRI was recommended, but he left AMA.  Patient states he spoke to cardiologist who suggested he return to ED. Blood work drawn and sent to lab, EKG performed in triage. Patient more comfortable sitting up. Provider evaluated patient, family present at the bedside, will continue to monitor patient closely.  Gary SLOAN

## 2018-12-04 NOTE — ED ADULT NURSE REASSESSMENT NOTE - NS ED NURSE REASSESS COMMENT FT1
Pt AOX4. Pt is sitting in chair and states decreased SOB. Pt is not in pain at the moment. Pt reports back pain intermittent and describes it as spasms. pt denies chest pain. Pt lower extremities have bilateral edema. Will continue to monitor.

## 2018-12-04 NOTE — ED PROVIDER NOTE - ATTENDING CONTRIBUTION TO CARE
MD Hunt:  patient seen and evaluated with the resident.  I was present for key portions of the History & Physical, and I agree with the Impression & Plan.  MD Hunt:  74 yo M, c/o SOB and CP.  Duration 4d.  Context:  CAD (cath'd w/stent 5/2010; EF 35% at that time).  Patient presented to the [facility] ED yesterday, at which time they performed an ACS r/o + PE r/o; delta-trop 2, CTA chest demonstrated no [large] PE (limited from motion artifact).  Associated Sx:  pleurisy, orthopnea, and peripheral edema.  ECG demonstrates old unchanged RBBB (unchanged from 12/2018)  VS:  tachy, normotensive.  Physical Exam: adult M, mild distress, sitting straight up in bed.  +dyspnea with prolonged speaking.  NCAT, PERRL, Lungs: breathsounds diminished bilaterally, +bipedal edema 1+ to the knee.    Impression:  CHF vs ACS,  although PE is still on the differential, he was r/o for large PE yesterday with sub-optimal CTA in the [facility] ED.  If ddimer is negative tonight, we will not pursue the diagnosis further with repeat CTA chest.  Anticipate elevated pro-bnp.  Primary cardiologist, Dr. Adal Long, admits to hospitalist with cardiology consulting.

## 2018-12-04 NOTE — H&P ADULT - NSHPLABSRESULTS_GEN_ALL_CORE
LABS:                        15.1   13.2  )-----------( 234      ( 03 Dec 2018 17:38 )             45.0     12-03    138  |  100  |  13  ----------------------------<  125<H>  3.9   |  23  |  0.79    Ca    9.9      03 Dec 2018 17:38    TPro  7.8  /  Alb  4.2  /  TBili  1.1  /  DBili  x   /  AST  22  /  ALT  36  /  AlkPhos  81  12-03    PT/INR - ( 03 Dec 2018 17:38 )   PT: 13.8 sec;   INR: 1.19 ratio         PTT - ( 03 Dec 2018 17:38 )  PTT:27.1 sec

## 2018-12-04 NOTE — CONSULT NOTE ADULT - SUBJECTIVE AND OBJECTIVE BOX
CHIEF COMPLAINT:    HISTORY OF PRESENT ILLNESS: 73 male with CAD s/p stent in 2010HTN,HLD,ICM,,CHF Ef 45%,,colon Ca s/p ileostomy 2012 reversal 2013 who presents with intermittent severe mid back spasm like pain  at time radiating  left under axillae, chest  and abdomen associate with sob and orthopnea   since  last week. Patient reports that he had 3 severe spasm attack since last week temporarily resolved w/ valium . His  chest symptoms are worse w/ inspiration.  Of note, Patient presented to the [facility] ED yesterday with similar symptoms and admitted for ACS w/u and AMA'd when he was told the admitting team wanted an MR of his spine because he was concerned he would not be able to tolerate MRI study 2/2 back pain lying flat. CTA chest at [facility] negative for PE but study limited by motion and contrast filling per their report. Cardiology  consulted for CHF vs r/o ACS. Patient denies fever chills, N/V, dizziness currently denies chest pain but have intermittent mild back spasm which relieved by bending forward.  In ED EKG showed NSR with  RBBB .CXR showing lower lobe effusion. Vital signs : 128/73,,RR 22.temp 97.8,sat 99% RA  Lab WBC 14.6, high sensitive trop : 11,pro .1 ,+UA     Allergies: No Known Allergies        	    MEDICATIONS:    Atorvastatin Calcium 20 MG Oral Tablet; take 1 tablet at bedtime  Clopidogrel Bisulfate 75 MG Oral Tablet; Take 1 tablet daily  Furosemide 20 MG Oral Tablet; Take 1 tablet daily  Klor-Con M20 20 MEQ Oral Tablet Extended Release; Take 1 tablet daily  Lisinopril 2.5 MG Oral Tablet; Take 1 tablet daily  Metoprolol Succinate ER 25 MG Oral Tablet Extended Release 24 Hour; Take 1 tablet daily                  PAST MEDICAL & SURGICAL HISTORY:  Gout  HLD (hyperlipidemia)  Colon carcinoma: Sp Ileostomy and reverasl with chemo/rt (2012)  CAD (coronary artery disease): SP ptca w stent 5/2010  HTN (Hypertension)  S/P colon resection      FAMILY HISTORY:  No pertinent family history in first degree relatives      SOCIAL HISTORY:    [ ] live with: spouse  use wine daily  nonsmoker        REVIEW OF SYSTEMS:  General: no fatigue/malaise, weight loss/gain.  Skin: no rashes.  Ophthalmologic: no blurred vision, no loss of vision. 	  ENT: no sore throat, rhinorrhea, sinus congestion.  Cardiovascular:+  chest pain ,no palpitation, no dizziness, no diaphoresis, no edema  Respiratory: + SOB,  no cough or wheeze.  Gastrointestinal:  no N/V/D, no melena/hematemesis/hematochezia,+ abdomen distension  Genitourinary: no dysuria/hesitancy or hematuria.  Musculoskeletal: no myalgias or arthralgias,+ mid back spasm  Neurological: no changes in vision or hearing, no lightheadedness/dizziness, no syncope/near syncope	  Psychiatric: no unusual stress/anxiety.       PHYSICAL EXAM:  T(C): 36.6 (12-04-18 @ 16:24), Max: 36.6 (12-04-18 @ 16:24)  HR: 109 (12-04-18 @ 19:40) (78 - 109)  BP: 146/80 (12-04-18 @ 19:40) (128/73 - 150/95)  RR: 16 (12-04-18 @ 19:40) (16 - 22)  SpO2: 96% (12-04-18 @ 19:40) (95% - 100%)      Appearance: Normal	  HEENT:   Normal oral mucosa, PERRL, EOMI	  Lymphatic: No lymphadenopathy  Cardiovascular: Normal S1 S2, No JVD, No murmurs, trace b/l pedal edema  Respiratory: Lungs clear to auscultation	  Psychiatry: A & O x 3, Mood & affect appropriate  Gastrointestinal:  Soft, Non-tender, + BS	,+ distension  Skin: No rashes, No ecchymoses, No cyanosis	  Neurologic: Non-focal  Extremities: Normal range of motion, No clubbing, cyanosis   Vascular: Peripheral pulses palpable 2+ bilaterally        LABS:	 	    CBC Full  -  ( 04 Dec 2018 19:30 )  WBC Count : 14.66 K/uL  Hemoglobin : 14.7 g/dL  Hematocrit : 45.4 %  Platelet Count - Automated : 267 K/uL  Mean Cell Volume : 88.7 fL  Mean Cell Hemoglobin : 28.7 pg  Mean Cell Hemoglobin Concentration : 32.4 %  Auto Neutrophil # : 12.53 K/uL  Auto Lymphocyte # : 0.77 K/uL  Auto Monocyte # : 1.24 K/uL  Auto Eosinophil # : 0.03 K/uL  Auto Basophil # : 0.02 K/uL  Auto Neutrophil % : 85.4 %  Auto Lymphocyte % : 5.3 %  Auto Monocyte % : 8.5 %  Auto Eosinophil % : 0.2 %  Auto Basophil % : 0.1 %    12-04    137  |  99  |  12  ----------------------------<  122<H>  3.9   |  23  |  0.82  12-03    138  |  100  |  13  ----------------------------<  125<H>  3.9   |  23  |  0.79    Ca    9.9      04 Dec 2018 19:30  Ca    9.9      03 Dec 2018 17:38    TPro  7.6  /  Alb  4.0  /  TBili  1.1  /  DBili  x   /  AST  18  /  ALT  31  /  AlkPhos  83  12-04  TPro  7.8  /  Alb  4.2  /  TBili  1.1  /  DBili  x   /  AST  22  /  ALT  36  /  AlkPhos  81  12-03      proBNP: Serum Pro-Brain Natriuretic Peptide: 188.1 pg/mL (12-04 @ 19:30)        CARDIAC MARKERS:   high sens troponin 11    	    ECG:  NSR with RBBB  RADIOLOGY:< from: Xray Chest 2 Views PA/Lat (12.04.18 @ 20:48) >small left pleural effusion  OTHER: 	    PREVIOUS DIAGNOSTIC TESTING:    [ ] Echocardiogram:< from: TTE with Doppler (w/Cont) (02.04.15 @ 08:45) >  Endocardial visualization enhanced with intravenous injection of echo contrast (Definity).  Despite the use of echo contrast the visualization endocardium was still  suboptimal. Overall left ventricular ejection fraction appears grossly mild-moderate reduced , however, suboptimal image quality limits wall motion assessment and segmental wall-motion abnormalities cannot be completely ruled out. Ef about 45%    < end of copied text > CHIEF COMPLAINT:    HISTORY OF PRESENT ILLNESS: 73 male with CAD s/p stent in 2010HTN,HLD,ICM,,CHF Ef 45%,,colon Ca s/p ileostomy 2012 reversal 2013 who presents with intermittent severe mid back spasm like pain  at time radiating  left under axillae, chest  and abdomen associate with sob and orthopnea   since  last week. Patient reports that he had 3 severe spasm attack since last week temporarily resolved w/ valium . His  chest symptoms are worse w/ inspiration.  Of note, Patient presented to the [facility] ED yesterday with similar symptoms and admitted for ACS w/u and AMA'd when he was told the admitting team wanted an MR of his spine because he was concerned he would not be able to tolerate MRI study 2/2 back pain lying flat. CTA chest at [facility] negative for PE but study limited by motion and contrast filling per their report. Cardiology  consulted for CHF vs r/o ACS. Patient denies fever chills, N/V, dizziness currently denies chest pain but have intermittent mild back spasm which relieved by bending forward.  In ED EKG showed NSR with  RBBB .CXR showing lower lobe effusion. Vital signs : 128/73,,RR 22.temp 97.8,sat 99% RA  Lab WBC 14.6, high sensitive trop : 11,pro .1 ,+UA     Allergies: No Known Allergies        	    MEDICATIONS:    Atorvastatin Calcium 20 MG Oral Tablet; take 1 tablet at bedtime  Clopidogrel Bisulfate 75 MG Oral Tablet; Take 1 tablet daily  Furosemide 20 MG Oral Tablet; Take 1 tablet daily  Klor-Con M20 20 MEQ Oral Tablet Extended Release; Take 1 tablet daily  Lisinopril 2.5 MG Oral Tablet; Take 1 tablet daily  Metoprolol Succinate ER 25 MG Oral Tablet Extended Release 24 Hour; Take 1 tablet daily                  PAST MEDICAL & SURGICAL HISTORY:  Gout  HLD (hyperlipidemia)  Colon carcinoma: Sp Ileostomy and reverasl with chemo/rt (2012)  CAD (coronary artery disease): SP ptca w stent 5/2010  HTN (Hypertension)  S/P colon resection      FAMILY HISTORY:  No pertinent family history in first degree relatives      SOCIAL HISTORY:    [ ] live with: spouse  use wine daily  nonsmoker        REVIEW OF SYSTEMS:  General: no fatigue/malaise, weight loss/gain.  Skin: no rashes.  Ophthalmologic: no blurred vision, no loss of vision. 	  ENT: no sore throat, rhinorrhea, sinus congestion.  Cardiovascular:+  chest pain ,no palpitation, no dizziness, no diaphoresis, no edema  Respiratory: + SOB,  no cough or wheeze.  Gastrointestinal:  no N/V/D, no melena/hematemesis/hematochezia,+ abdomen distension  Genitourinary: no dysuria/hesitancy or hematuria.  Musculoskeletal: no myalgias or arthralgias,+ mid back spasm  Neurological: no changes in vision or hearing, no lightheadedness/dizziness, no syncope/near syncope	  Psychiatric: no unusual stress/anxiety.       PHYSICAL EXAM:  T(C): 36.6 (12-04-18 @ 16:24), Max: 36.6 (12-04-18 @ 16:24)  HR: 109 (12-04-18 @ 19:40) (78 - 109)  BP: 146/80 (12-04-18 @ 19:40) (128/73 - 150/95)  RR: 16 (12-04-18 @ 19:40) (16 - 22)  SpO2: 96% (12-04-18 @ 19:40) (95% - 100%)      Appearance: Normal	  HEENT:   Normal oral mucosa, PERRL, EOMI	  Lymphatic: No lymphadenopathy  Cardiovascular: Normal S1 S2, No JVD, No murmurs, trace b/l pedal edema  Respiratory: Lungs clear to auscultation	  Psychiatry: A & O x 3, Mood & affect appropriate  Gastrointestinal:  Soft, Non-tender, + BS	,+ distension  Skin: No rashes, No ecchymoses, No cyanosis	  Neurologic: Non-focal  Extremities: Normal range of motion, No clubbing, cyanosis   Vascular: Peripheral pulses palpable 2+ bilaterally        LABS:	 	    CBC Full  -  ( 04 Dec 2018 19:30 )  WBC Count : 14.66 K/uL  Hemoglobin : 14.7 g/dL  Hematocrit : 45.4 %  Platelet Count - Automated : 267 K/uL  Mean Cell Volume : 88.7 fL  Mean Cell Hemoglobin : 28.7 pg  Mean Cell Hemoglobin Concentration : 32.4 %  Auto Neutrophil # : 12.53 K/uL  Auto Lymphocyte # : 0.77 K/uL  Auto Monocyte # : 1.24 K/uL  Auto Eosinophil # : 0.03 K/uL  Auto Basophil # : 0.02 K/uL  Auto Neutrophil % : 85.4 %  Auto Lymphocyte % : 5.3 %  Auto Monocyte % : 8.5 %  Auto Eosinophil % : 0.2 %  Auto Basophil % : 0.1 %    12-04    137  |  99  |  12  ----------------------------<  122<H>  3.9   |  23  |  0.82  12-03    138  |  100  |  13  ----------------------------<  125<H>  3.9   |  23  |  0.79    Ca    9.9      04 Dec 2018 19:30  Ca    9.9      03 Dec 2018 17:38    TPro  7.6  /  Alb  4.0  /  TBili  1.1  /  DBili  x   /  AST  18  /  ALT  31  /  AlkPhos  83  12-04  TPro  7.8  /  Alb  4.2  /  TBili  1.1  /  DBili  x   /  AST  22  /  ALT  36  /  AlkPhos  81  12-03      proBNP: Serum Pro-Brain Natriuretic Peptide: 188.1 pg/mL (12-04 @ 19:30)        CARDIAC MARKERS:   high sens troponin 11    	    ECG:  NSR with RBBB  RADIOLOGY:< from: Xray Chest 2 Views PA/Lat (12.04.18 @ 20:48) >small left pleural effusion  OTHER: 	    PREVIOUS DIAGNOSTIC TESTING:    [ ] Echocardiogram:< from: TTE with Doppler (w/Cont) (02.04.15 @ 08:45) >  Endocardial visualization enhanced with intravenous injection of echo contrast (Definity).  Despite the use of echo contrast the visualization endocardium was still  suboptimal. Overall left ventricular ejection fraction appears grossly mild-moderate reduced , however, suboptimal image quality limits wall motion assessment and segmental wall-motion abnormalities cannot be completely ruled out. Ef about 45%    < end of copied text >    Cardiac Cath: 5/23/10, ostial LM 40%, prox LAD 40%, mid LAD 40%, ostial S1 80%, prox Cx 20%, distal Cx 80%, OM1 20%, OM2 70%, prox RPLS 100%, LVEF 35%, LVEDP 30 mmHg s/p  ENDEAVOR stent to prox RPLS CHIEF COMPLAINT:    HISTORY OF PRESENT ILLNESS: 73 male with CAD s/p stent in 2010HTN,HLD,ICM,,CHF Ef 45%,,colon Ca s/p ileostomy 2012 reversal 2013 who presents with intermittent severe mid back spasm like pain  at time radiating  left under axillae, chest  and abdomen associate with sob and orthopnea   since  last week. Patient reports that he had 3 severe spasm attack since last week temporarily resolved w/ valium . His  chest symptoms are worse w/ inspiration.  Of note, Patient presented to the [facility] ED yesterday with similar symptoms and admitted for ACS w/u and AMA'd when he was told the admitting team wanted an MR of his spine because he was concerned he would not be able to tolerate MRI study 2/2 back pain lying flat. CTA chest at [facility] negative for PE but study limited by motion and contrast filling per their report. Cardiology  consulted for CHF vs r/o ACS. Patient denies fever chills, N/V, dizziness currently denies chest pain but have intermittent mild back spasm which relieved by bending forward.  In ED EKG showed NSR with  RBBB .CXR showing lower lobe effusion. Vital signs : 128/73,,RR 22.temp 97.8,sat 99% RA  Lab WBC 14.6, high sensitive trop : 11,pro .1 ,+UA     Allergies: No Known Allergies        	    MEDICATIONS:    Atorvastatin Calcium 20 MG Oral Tablet; take 1 tablet at bedtime  Clopidogrel Bisulfate 75 MG Oral Tablet; Take 1 tablet daily  Furosemide 20 MG Oral Tablet; Take 1 tablet daily  Klor-Con M20 20 MEQ Oral Tablet Extended Release; Take 1 tablet daily  Lisinopril 2.5 MG Oral Tablet; Take 1 tablet daily  Metoprolol Succinate ER 25 MG Oral Tablet Extended Release 24 Hour; Take 1 tablet daily                  PAST MEDICAL & SURGICAL HISTORY:  Gout  HLD (hyperlipidemia)  Colon carcinoma: Sp Ileostomy and reverasl with chemo/rt (2012)  CAD (coronary artery disease): SP ptca w stent 5/2010  HTN (Hypertension)  S/P colon resection      FAMILY HISTORY:  No pertinent family history in first degree relatives      SOCIAL HISTORY:    [ ] live with: spouse  use wine daily  nonsmoker        REVIEW OF SYSTEMS:  General: no fatigue/malaise, weight loss/gain.  Skin: no rashes.  Ophthalmologic: no blurred vision, no loss of vision. 	  ENT: no sore throat, rhinorrhea, sinus congestion.  Cardiovascular:+  chest pain ,no palpitation, no dizziness, no diaphoresis, no edema  Respiratory: + SOB,  no cough or wheeze.  Gastrointestinal:  no N/V/D, no melena/hematemesis/hematochezia,+ abdomen distension  Genitourinary: no dysuria/hesitancy or hematuria.  Musculoskeletal: no myalgias or arthralgias,+ mid back spasm  Neurological: no changes in vision or hearing, no lightheadedness/dizziness, no syncope/near syncope	  Psychiatric: no unusual stress/anxiety.       PHYSICAL EXAM:  T(C): 36.6 (12-04-18 @ 16:24), Max: 36.6 (12-04-18 @ 16:24)  HR: 109 (12-04-18 @ 19:40) (78 - 109)  BP: 146/80 (12-04-18 @ 19:40) (128/73 - 150/95)  RR: 16 (12-04-18 @ 19:40) (16 - 22)  SpO2: 96% (12-04-18 @ 19:40) (95% - 100%)      Appearance: Normal	  HEENT:   Normal oral mucosa, PERRL, EOMI	  Lymphatic: No lymphadenopathy  Cardiovascular: Normal S1 S2, No JVD, No murmurs, trace b/l pedal edema  Respiratory: Lungs clear to auscultation	  Psychiatry: A & O x 3, Mood & affect appropriate  Gastrointestinal:  Soft, Non-tender, + BS	,+ distension  Skin: No rashes, No ecchymoses, No cyanosis	  Neurologic: Non-focal  Extremities: Normal range of motion, No clubbing, cyanosis   Vascular: Peripheral pulses palpable 2+ bilaterally        LABS:	 	    CBC Full  -  ( 04 Dec 2018 19:30 )  WBC Count : 14.66 K/uL  Hemoglobin : 14.7 g/dL  Hematocrit : 45.4 %  Platelet Count - Automated : 267 K/uL  Mean Cell Volume : 88.7 fL  Mean Cell Hemoglobin : 28.7 pg  Mean Cell Hemoglobin Concentration : 32.4 %  Auto Neutrophil # : 12.53 K/uL  Auto Lymphocyte # : 0.77 K/uL  Auto Monocyte # : 1.24 K/uL  Auto Eosinophil # : 0.03 K/uL  Auto Basophil # : 0.02 K/uL  Auto Neutrophil % : 85.4 %  Auto Lymphocyte % : 5.3 %  Auto Monocyte % : 8.5 %  Auto Eosinophil % : 0.2 %  Auto Basophil % : 0.1 %    12-04    137  |  99  |  12  ----------------------------<  122<H>  3.9   |  23  |  0.82  12-03    138  |  100  |  13  ----------------------------<  125<H>  3.9   |  23  |  0.79    Ca    9.9      04 Dec 2018 19:30  Ca    9.9      03 Dec 2018 17:38    TPro  7.6  /  Alb  4.0  /  TBili  1.1  /  DBili  x   /  AST  18  /  ALT  31  /  AlkPhos  83  12-04  TPro  7.8  /  Alb  4.2  /  TBili  1.1  /  DBili  x   /  AST  22  /  ALT  36  /  AlkPhos  81  12-03      proBNP: Serum Pro-Brain Natriuretic Peptide: 188.1 pg/mL (12-04 @ 19:30)        CARDIAC MARKERS:   high sens troponin 11    	    ECG:  NSR with RBBB  RADIOLOGY:< from: Xray Chest 2 Views PA/Lat (12.04.18 @ 20:48) >small left pleural effusion  OTHER: 	    PREVIOUS DIAGNOSTIC TESTING:    [ ] Echocardiogram:< from: TTE with Doppler (w/Cont) (02.04.15 @ 08:45) >  Endocardial visualization enhanced with intravenous injection of echo contrast (Definity).  Despite the use of echo contrast the visualization endocardium was still  suboptimal. Overall left ventricular ejection fraction appears grossly mild-moderate reduced , however, suboptimal image quality limits wall motion assessment and segmental wall-motion abnormalities cannot be completely ruled out. Ef about 45%    < end of copied text >    Cardiac Cath: 5/23/10, ostial LM 40%, prox LAD 40%, mid LAD 40%, ostial S1 80%, prox Cx 20%, distal Cx 80%, OM1 20%, OM2 70%, prox RPLS 100%, LVEF 35%, LVEDP 30 mmHg s/p  ENDEAVOR stent to prox RPLS  nuclear stress test:normal CHIEF COMPLAINT:    HISTORY OF PRESENT ILLNESS: 73 male with CAD s/p stent in 2010HTN,HLD,ICM,,CHF Ef 45%,,colon Ca s/p ileostomy 2012 reversal 2013 who presents with intermittent severe mid back spasm like pain  at time radiating  left under axillae, chest  and abdomen associate with sob and orthopnea   since  last week. Patient reports that he had 3 severe spasm attack since last week temporarily resolved w/ valium . His  chest symptoms are worse w/ inspiration.  Of note, Patient presented to the [facility] ED yesterday with similar symptoms and admitted for ACS w/u and AMA'd when he was told the admitting team wanted an MR of his spine because he was concerned he would not be able to tolerate MRI study 2/2 back pain lying flat. CTA chest at [facility] negative for PE but study limited by motion and contrast filling per their report. Cardiology  consulted for CHF vs r/o ACS. Patient denies fever chills, N/V, dizziness currently denies chest pain but have intermittent mild back spasm which relieved by bending forward. He took valium last week from his wife for pain. In ED EKG showed NSR with  RBBB .CXR showing lower lobe effusion. Vital signs : 128/73,,RR 22.temp 97.8,sat 99% RA  Lab WBC 14.6, high sensitive trop : 11,pro .1 ,+UA     Allergies: No Known Allergies        	    MEDICATIONS:    Atorvastatin Calcium 20 MG Oral Tablet; take 1 tablet at bedtime  Clopidogrel Bisulfate 75 MG Oral Tablet; Take 1 tablet daily  Furosemide 20 MG Oral Tablet; Take 1 tablet daily  Klor-Con M20 20 MEQ Oral Tablet Extended Release; Take 1 tablet daily  Lisinopril 2.5 MG Oral Tablet; Take 1 tablet daily  Metoprolol Succinate ER 25 MG Oral Tablet Extended Release 24 Hour; Take 1 tablet daily                  PAST MEDICAL & SURGICAL HISTORY:  Gout  HLD (hyperlipidemia)  Colon carcinoma: Sp Ileostomy and reverasl with chemo/rt (2012)  CAD (coronary artery disease): SP ptca w stent 5/2010  HTN (Hypertension)  S/P colon resection      FAMILY HISTORY:  No pertinent family history in first degree relatives      SOCIAL HISTORY:    [ ] live with: spouse  use wine daily  nonsmoker        REVIEW OF SYSTEMS:  General: no fatigue/malaise, weight loss/gain.  Skin: no rashes.  Ophthalmologic: no blurred vision, no loss of vision. 	  ENT: no sore throat, rhinorrhea, sinus congestion.  Cardiovascular:+  chest pain ,no palpitation, no dizziness, no diaphoresis, no edema  Respiratory: + SOB,  no cough or wheeze.  Gastrointestinal:  no N/V/D, no melena/hematemesis/hematochezia,+ abdomen distension  Genitourinary: no dysuria/hesitancy or hematuria.  Musculoskeletal: no myalgias or arthralgias,+ mid back spasm  Neurological: no changes in vision or hearing, no lightheadedness/dizziness, no syncope/near syncope	  Psychiatric: no unusual stress/anxiety.       PHYSICAL EXAM:  T(C): 36.6 (12-04-18 @ 16:24), Max: 36.6 (12-04-18 @ 16:24)  HR: 109 (12-04-18 @ 19:40) (78 - 109)  BP: 146/80 (12-04-18 @ 19:40) (128/73 - 150/95)  RR: 16 (12-04-18 @ 19:40) (16 - 22)  SpO2: 96% (12-04-18 @ 19:40) (95% - 100%)      Appearance: Normal	  HEENT:   Normal oral mucosa, PERRL, EOMI	  Lymphatic: No lymphadenopathy  Cardiovascular: Normal S1 S2, No JVD, No murmurs, trace b/l pedal edema  Respiratory: Lungs clear to auscultation	  Psychiatry: A & O x 3, Mood & affect appropriate  Gastrointestinal:  Soft, Non-tender, + BS	,+ distension  Skin: No rashes, No ecchymoses, No cyanosis	  Neurologic: Non-focal  Extremities: Normal range of motion, No clubbing, cyanosis   Vascular: Peripheral pulses palpable 2+ bilaterally        LABS:	 	    CBC Full  -  ( 04 Dec 2018 19:30 )  WBC Count : 14.66 K/uL  Hemoglobin : 14.7 g/dL  Hematocrit : 45.4 %  Platelet Count - Automated : 267 K/uL  Mean Cell Volume : 88.7 fL  Mean Cell Hemoglobin : 28.7 pg  Mean Cell Hemoglobin Concentration : 32.4 %  Auto Neutrophil # : 12.53 K/uL  Auto Lymphocyte # : 0.77 K/uL  Auto Monocyte # : 1.24 K/uL  Auto Eosinophil # : 0.03 K/uL  Auto Basophil # : 0.02 K/uL  Auto Neutrophil % : 85.4 %  Auto Lymphocyte % : 5.3 %  Auto Monocyte % : 8.5 %  Auto Eosinophil % : 0.2 %  Auto Basophil % : 0.1 %    12-04    137  |  99  |  12  ----------------------------<  122<H>  3.9   |  23  |  0.82  12-03    138  |  100  |  13  ----------------------------<  125<H>  3.9   |  23  |  0.79    Ca    9.9      04 Dec 2018 19:30  Ca    9.9      03 Dec 2018 17:38    TPro  7.6  /  Alb  4.0  /  TBili  1.1  /  DBili  x   /  AST  18  /  ALT  31  /  AlkPhos  83  12-04  TPro  7.8  /  Alb  4.2  /  TBili  1.1  /  DBili  x   /  AST  22  /  ALT  36  /  AlkPhos  81  12-03      proBNP: Serum Pro-Brain Natriuretic Peptide: 188.1 pg/mL (12-04 @ 19:30)        CARDIAC MARKERS:   high sens troponin 11    	    ECG:  NSR with RBBB  RADIOLOGY:< from: Xray Chest 2 Views PA/Lat (12.04.18 @ 20:48) >small left pleural effusion  OTHER: 	    PREVIOUS DIAGNOSTIC TESTING:    [ ] Echocardiogram:< from: TTE with Doppler (w/Cont) (02.04.15 @ 08:45) >  Endocardial visualization enhanced with intravenous injection of echo contrast (Definity).  Despite the use of echo contrast the visualization endocardium was still  suboptimal. Overall left ventricular ejection fraction appears grossly mild-moderate reduced , however, suboptimal image quality limits wall motion assessment and segmental wall-motion abnormalities cannot be completely ruled out. Ef about 45%    < end of copied text >    Cardiac Cath: 5/23/10, ostial LM 40%, prox LAD 40%, mid LAD 40%, ostial S1 80%, prox Cx 20%, distal Cx 80%, OM1 20%, OM2 70%, prox RPLS 100%, LVEF 35%, LVEDP 30 mmHg s/p  ENDEAVOR stent to prox RPLS  nuclear stress test:normal HISTORY OF PRESENT ILLNESS: 73 male with CAD s/p stent in 2010HTN,HLD,ICM,,CHF Ef 45%,,colon Ca s/p ileostomy 2012 reversal 2013 who presents with intermittent severe mid back spasm like pain  at time radiating  left under axillae, chest  and abdomen associate with sob and orthopnea   since  last week. Patient reports that he had 3 severe spasm attack since last week temporarily resolved w/ valium . His  chest symptoms are worse w/ inspiration.  Of note, Patient presented to the [facility] ED yesterday with similar symptoms and admitted for ACS w/u and AMA'd when he was told the admitting team wanted an MR of his spine because he was concerned he would not be able to tolerate MRI study 2/2 back pain lying flat. CTA chest at [facility] negative for PE but study limited by motion and contrast filling per their report. Cardiology  consulted for CHF vs r/o ACS. Patient denies fever chills, N/V, dizziness currently denies chest pain but have intermittent mild back spasm which relieved by bending forward. He took valium last week from his wife for pain. In ED EKG showed NSR with  RBBB .CXR showing lower lobe effusion. Vital signs : 128/73,,RR 22.temp 97.8,sat 99% RA  Lab WBC 14.6, high sensitive trop : 11,pro .1 ,+UA     Allergies: No Known Allergies        	    MEDICATIONS:    Atorvastatin Calcium 20 MG Oral Tablet; take 1 tablet at bedtime  Clopidogrel Bisulfate 75 MG Oral Tablet; Take 1 tablet daily  Furosemide 20 MG Oral Tablet; Take 1 tablet daily  Klor-Con M20 20 MEQ Oral Tablet Extended Release; Take 1 tablet daily  Lisinopril 2.5 MG Oral Tablet; Take 1 tablet daily  Metoprolol Succinate ER 25 MG Oral Tablet Extended Release 24 Hour; Take 1 tablet daily                  PAST MEDICAL & SURGICAL HISTORY:  Gout  HLD (hyperlipidemia)  Colon carcinoma: Sp Ileostomy and reverasl with chemo/rt (2012)  CAD (coronary artery disease): SP ptca w stent 5/2010  HTN (Hypertension)  S/P colon resection      FAMILY HISTORY:  No pertinent family history in first degree relatives      SOCIAL HISTORY:    [ ] live with: spouse  use wine daily  nonsmoker        REVIEW OF SYSTEMS:  General: no fatigue/malaise, weight loss/gain.  Skin: no rashes.  Ophthalmologic: no blurred vision, no loss of vision. 	  ENT: no sore throat, rhinorrhea, sinus congestion.  Cardiovascular:+  chest pain ,no palpitation, no dizziness, no diaphoresis, no edema  Respiratory: + SOB,  no cough or wheeze.  Gastrointestinal:  no N/V/D, no melena/hematemesis/hematochezia,+ abdomen distension  Genitourinary: no dysuria/hesitancy or hematuria.  Musculoskeletal: no myalgias or arthralgias,+ mid back spasm  Neurological: no changes in vision or hearing, no lightheadedness/dizziness, no syncope/near syncope	  Psychiatric: no unusual stress/anxiety.       PHYSICAL EXAM:  T(C): 36.6 (12-04-18 @ 16:24), Max: 36.6 (12-04-18 @ 16:24)  HR: 109 (12-04-18 @ 19:40) (78 - 109)  BP: 146/80 (12-04-18 @ 19:40) (128/73 - 150/95)  RR: 16 (12-04-18 @ 19:40) (16 - 22)  SpO2: 96% (12-04-18 @ 19:40) (95% - 100%)      Appearance: Normal	  HEENT:   Normal oral mucosa, PERRL, EOMI	  Lymphatic: No lymphadenopathy  Cardiovascular: Normal S1 S2, No JVD, No murmurs, trace b/l pedal edema  Respiratory: Lungs clear to auscultation	  Psychiatry: A & O x 3, Mood & affect appropriate  Gastrointestinal:  Soft, Non-tender, + BS	,+ distension  Skin: No rashes, No ecchymoses, No cyanosis	  Neurologic: Non-focal  Extremities: Normal range of motion, No clubbing, cyanosis   Vascular: Peripheral pulses palpable 2+ bilaterally        LABS:	 	    CBC Full  -  ( 04 Dec 2018 19:30 )  WBC Count : 14.66 K/uL  Hemoglobin : 14.7 g/dL  Hematocrit : 45.4 %  Platelet Count - Automated : 267 K/uL  Mean Cell Volume : 88.7 fL  Mean Cell Hemoglobin : 28.7 pg  Mean Cell Hemoglobin Concentration : 32.4 %  Auto Neutrophil # : 12.53 K/uL  Auto Lymphocyte # : 0.77 K/uL  Auto Monocyte # : 1.24 K/uL  Auto Eosinophil # : 0.03 K/uL  Auto Basophil # : 0.02 K/uL  Auto Neutrophil % : 85.4 %  Auto Lymphocyte % : 5.3 %  Auto Monocyte % : 8.5 %  Auto Eosinophil % : 0.2 %  Auto Basophil % : 0.1 %    12-04    137  |  99  |  12  ----------------------------<  122<H>  3.9   |  23  |  0.82  12-03    138  |  100  |  13  ----------------------------<  125<H>  3.9   |  23  |  0.79    Ca    9.9      04 Dec 2018 19:30  Ca    9.9      03 Dec 2018 17:38    TPro  7.6  /  Alb  4.0  /  TBili  1.1  /  DBili  x   /  AST  18  /  ALT  31  /  AlkPhos  83  12-04  TPro  7.8  /  Alb  4.2  /  TBili  1.1  /  DBili  x   /  AST  22  /  ALT  36  /  AlkPhos  81  12-03      proBNP: Serum Pro-Brain Natriuretic Peptide: 188.1 pg/mL (12-04 @ 19:30)        CARDIAC MARKERS:   high sens troponin 11    	    ECG:  NSR with RBBB  RADIOLOGY:< from: Xray Chest 2 Views PA/Lat (12.04.18 @ 20:48) >small left pleural effusion  OTHER: 	    PREVIOUS DIAGNOSTIC TESTING:    [ ] Echocardiogram:< from: TTE with Doppler (w/Cont) (02.04.15 @ 08:45) >  Endocardial visualization enhanced with intravenous injection of echo contrast (Definity).  Despite the use of echo contrast the visualization endocardium was still  suboptimal. Overall left ventricular ejection fraction appears grossly mild-moderate reduced , however, suboptimal image quality limits wall motion assessment and segmental wall-motion abnormalities cannot be completely ruled out. Ef about 45%    < end of copied text >    Cardiac Cath: 5/23/10, ostial LM 40%, prox LAD 40%, mid LAD 40%, ostial S1 80%, prox Cx 20%, distal Cx 80%, OM1 20%, OM2 70%, prox RPLS 100%, LVEF 35%, LVEDP 30 mmHg s/p  ENDEAVOR stent to prox RPLS  nuclear stress test:normal

## 2018-12-04 NOTE — H&P ADULT - ASSESSMENT
pt  with h/o cad/  stent, htn, hld   admitted with back pain and  mild  cp  cta  chest,  no pe   will need  imaging of  his back    cardiac meds   dvt ppx     < from: CT Angio Chest w/ IV Cont (12.03.18 @ 22:55) >  IMPRESSION: No main, right, left, or lobar pulmonary embolus on this   limited CT pulmonary angiogram.  Small left pleural effusion.  < end of copied text >      < from: Cardiac Cath Lab (05.23.10 @ 15:46) >  Summary:  1st lesion interventions: A successful balloon angioplasty with  thrombectomy and stent was performed on the 100 % lesion in the right  posterolateral segment. Following intervention there was a 1 % residual  stenosis.  < end of copied text >

## 2018-12-04 NOTE — CHART NOTE - NSCHARTNOTEFT_GEN_A_CORE
CHARLINE LAFLEUR  73y, Male    Chief c/o:    This is     with past medical History of     Vital Signs:  Vital Signs Last 24 Hrs  T(C): 36.5 (04 Dec 2018 07:15), Max: 37.1 (03 Dec 2018 15:24)  T(F): 97.7 (04 Dec 2018 07:15), Max: 98.7 (03 Dec 2018 15:24)  HR: 98 (04 Dec 2018 07:15) (78 - 99)  BP: 150/95 (04 Dec 2018 07:15) (118/79 - 157/92)  BP(mean): --  RR: 20 (04 Dec 2018 07:15) (18 - 20)  SpO2: 95% (04 Dec 2018 07:15) (94% - 100%)    Labs:                        15.1   13.2  )-----------( 234      ( 03 Dec 2018 17:38 )             45.0     CBC Full  -  ( 03 Dec 2018 17:38 )  WBC Count : 13.2 K/uL  Hemoglobin : 15.1 g/dL  Hematocrit : 45.0 %  Platelet Count - Automated : 234 K/uL  Mean Cell Volume : 88.3 fl  Mean Cell Hemoglobin : 29.7 pg  Mean Cell Hemoglobin Concentration : 33.6 gm/dL  Auto Neutrophil # : 11.5 K/uL  Auto Lymphocyte # : 0.7 K/uL  Auto Monocyte # : 0.9 K/uL  Auto Eosinophil # : 0.0 K/uL  Auto Basophil # : 0.0 K/uL  Auto Neutrophil % : 87.0 %  Auto Lymphocyte % : 5.4 %  Auto Monocyte % : 7.2 %  Auto Eosinophil % : 0.3 %  Auto Basophil % : 0.1 %    12-03    138  |  100  |  13  ----------------------------<  125<H>  3.9   |  23  |  0.79    Ca    9.9      03 Dec 2018 17:38    TPro  7.8  /  Alb  4.2  /  TBili  1.1  /  DBili  x   /  AST  22  /  ALT  36  /  AlkPhos  81  12-03        LIVER FUNCTIONS - ( 03 Dec 2018 17:38 )  Alb: 4.2 g/dL / Pro: 7.8 g/dL / ALK PHOS: 81 U/L / ALT: 36 U/L / AST: 22 U/L / GGT: x             Adult Advanced Hemodynamics Last 24 Hrs  CVP(mm Hg): --  CVP(cm H2O): --  CO: --  CI: --  PA: --  PA(mean): --  PCWP: --  SVR: --  SVRI: --  PVR: --  PVRI: --  PT/INR - ( 03 Dec 2018 17:38 )   PT: 13.8 sec;   INR: 1.19 ratio         PTT - ( 03 Dec 2018 17:38 )  PTT:27.1 sec    Physical Exam:  PHYSICAL EXAM:      Constitutional:    Eyes:    ENMT:    Neck:    Breasts:    Back:    Respiratory:    Cardiovascular:    Gastrointestinal:    Genitourinary:    Rectal:    Extremities:    Vascular:    Neurological:    Skin:    Lymph Nodes:    Musculoskeletal:    Psychiatric:        Assesment / Plan:

## 2018-12-04 NOTE — CONSULT NOTE ADULT - ASSESSMENT
ASSESSMENT/PLAN: 73 male with CAD s/p stent in 2010HTN,HLD,ICM,,CHF Ef 35%,,colon Ca s/p ileostomy 2012 reversal 2013 who presents with intermittent severe mid back spasm like pain  at time radiating  left under axillae, chest  and abdomen associate with sob and orthopnea   since  last week.

## 2018-12-05 DIAGNOSIS — A41.9 SEPSIS, UNSPECIFIED ORGANISM: ICD-10-CM

## 2018-12-05 DIAGNOSIS — Z29.9 ENCOUNTER FOR PROPHYLACTIC MEASURES, UNSPECIFIED: ICD-10-CM

## 2018-12-05 DIAGNOSIS — I50.22 CHRONIC SYSTOLIC (CONGESTIVE) HEART FAILURE: ICD-10-CM

## 2018-12-05 DIAGNOSIS — I25.10 ATHEROSCLEROTIC HEART DISEASE OF NATIVE CORONARY ARTERY WITHOUT ANGINA PECTORIS: ICD-10-CM

## 2018-12-05 DIAGNOSIS — C18.9 MALIGNANT NEOPLASM OF COLON, UNSPECIFIED: ICD-10-CM

## 2018-12-05 DIAGNOSIS — M54.5 LOW BACK PAIN: ICD-10-CM

## 2018-12-05 PROBLEM — E78.5 HYPERLIPIDEMIA, UNSPECIFIED: Chronic | Status: ACTIVE | Noted: 2018-12-03

## 2018-12-05 PROBLEM — M10.9 GOUT, UNSPECIFIED: Chronic | Status: ACTIVE | Noted: 2018-12-03

## 2018-12-05 LAB
BUN SERPL-MCNC: 12 MG/DL — SIGNIFICANT CHANGE UP (ref 7–23)
CALCIUM SERPL-MCNC: 9.1 MG/DL — SIGNIFICANT CHANGE UP (ref 8.4–10.5)
CHLORIDE SERPL-SCNC: 100 MMOL/L — SIGNIFICANT CHANGE UP (ref 98–107)
CO2 SERPL-SCNC: 22 MMOL/L — SIGNIFICANT CHANGE UP (ref 22–31)
CREAT SERPL-MCNC: 0.84 MG/DL — SIGNIFICANT CHANGE UP (ref 0.5–1.3)
GLUCOSE SERPL-MCNC: 128 MG/DL — HIGH (ref 70–99)
HCT VFR BLD CALC: 41.1 % — SIGNIFICANT CHANGE UP (ref 39–50)
HGB BLD-MCNC: 13.9 G/DL — SIGNIFICANT CHANGE UP (ref 13–17)
MAGNESIUM SERPL-MCNC: 2 MG/DL — SIGNIFICANT CHANGE UP (ref 1.6–2.6)
MCHC RBC-ENTMCNC: 30.1 PG — SIGNIFICANT CHANGE UP (ref 27–34)
MCHC RBC-ENTMCNC: 33.8 % — SIGNIFICANT CHANGE UP (ref 32–36)
MCV RBC AUTO: 89 FL — SIGNIFICANT CHANGE UP (ref 80–100)
NRBC # FLD: 0 — SIGNIFICANT CHANGE UP
PHOSPHATE SERPL-MCNC: 2.3 MG/DL — LOW (ref 2.5–4.5)
PLATELET # BLD AUTO: 239 K/UL — SIGNIFICANT CHANGE UP (ref 150–400)
PMV BLD: 9.7 FL — SIGNIFICANT CHANGE UP (ref 7–13)
POTASSIUM SERPL-MCNC: 3.9 MMOL/L — SIGNIFICANT CHANGE UP (ref 3.5–5.3)
POTASSIUM SERPL-SCNC: 3.9 MMOL/L — SIGNIFICANT CHANGE UP (ref 3.5–5.3)
RBC # BLD: 4.62 M/UL — SIGNIFICANT CHANGE UP (ref 4.2–5.8)
RBC # FLD: 14.1 % — SIGNIFICANT CHANGE UP (ref 10.3–14.5)
SODIUM SERPL-SCNC: 137 MMOL/L — SIGNIFICANT CHANGE UP (ref 135–145)
TROPONIN T, HIGH SENSITIVITY: 13 NG/L — SIGNIFICANT CHANGE UP (ref ?–14)
WBC # BLD: 12.98 K/UL — HIGH (ref 3.8–10.5)
WBC # FLD AUTO: 12.98 K/UL — HIGH (ref 3.8–10.5)

## 2018-12-05 PROCEDURE — 12345: CPT | Mod: NC,GC

## 2018-12-05 PROCEDURE — 72100 X-RAY EXAM L-S SPINE 2/3 VWS: CPT | Mod: 26

## 2018-12-05 PROCEDURE — 93306 TTE W/DOPPLER COMPLETE: CPT | Mod: 26

## 2018-12-05 PROCEDURE — 99232 SBSQ HOSP IP/OBS MODERATE 35: CPT | Mod: GC

## 2018-12-05 PROCEDURE — 99223 1ST HOSP IP/OBS HIGH 75: CPT | Mod: GC

## 2018-12-05 PROCEDURE — 74176 CT ABD & PELVIS W/O CONTRAST: CPT | Mod: 26

## 2018-12-05 RX ORDER — HEPARIN SODIUM 5000 [USP'U]/ML
5000 INJECTION INTRAVENOUS; SUBCUTANEOUS EVERY 8 HOURS
Qty: 0 | Refills: 0 | Status: DISCONTINUED | OUTPATIENT
Start: 2018-12-05 | End: 2018-12-08

## 2018-12-05 RX ORDER — CEFTRIAXONE 500 MG/1
1 INJECTION, POWDER, FOR SOLUTION INTRAMUSCULAR; INTRAVENOUS EVERY 24 HOURS
Qty: 0 | Refills: 0 | Status: DISCONTINUED | OUTPATIENT
Start: 2018-12-05 | End: 2018-12-08

## 2018-12-05 RX ORDER — FUROSEMIDE 40 MG
20 TABLET ORAL DAILY
Qty: 0 | Refills: 0 | Status: DISCONTINUED | OUTPATIENT
Start: 2018-12-05 | End: 2018-12-08

## 2018-12-05 RX ORDER — SODIUM,POTASSIUM PHOSPHATES 278-250MG
1 POWDER IN PACKET (EA) ORAL
Qty: 0 | Refills: 0 | Status: COMPLETED | OUTPATIENT
Start: 2018-12-05 | End: 2018-12-05

## 2018-12-05 RX ORDER — CYCLOBENZAPRINE HYDROCHLORIDE 10 MG/1
5 TABLET, FILM COATED ORAL
Qty: 0 | Refills: 0 | Status: DISCONTINUED | OUTPATIENT
Start: 2018-12-05 | End: 2018-12-08

## 2018-12-05 RX ORDER — CLOPIDOGREL BISULFATE 75 MG/1
75 TABLET, FILM COATED ORAL DAILY
Qty: 0 | Refills: 0 | Status: DISCONTINUED | OUTPATIENT
Start: 2018-12-05 | End: 2018-12-08

## 2018-12-05 RX ORDER — METOPROLOL TARTRATE 50 MG
25 TABLET ORAL DAILY
Qty: 0 | Refills: 0 | Status: DISCONTINUED | OUTPATIENT
Start: 2018-12-05 | End: 2018-12-08

## 2018-12-05 RX ORDER — ALLOPURINOL 300 MG
100 TABLET ORAL
Qty: 0 | Refills: 0 | Status: DISCONTINUED | OUTPATIENT
Start: 2018-12-05 | End: 2018-12-08

## 2018-12-05 RX ORDER — ATORVASTATIN CALCIUM 80 MG/1
20 TABLET, FILM COATED ORAL AT BEDTIME
Qty: 0 | Refills: 0 | Status: DISCONTINUED | OUTPATIENT
Start: 2018-12-05 | End: 2018-12-08

## 2018-12-05 RX ADMIN — CEFTRIAXONE 100 GRAM(S): 500 INJECTION, POWDER, FOR SOLUTION INTRAMUSCULAR; INTRAVENOUS at 21:29

## 2018-12-05 RX ADMIN — CLOPIDOGREL BISULFATE 75 MILLIGRAM(S): 75 TABLET, FILM COATED ORAL at 13:32

## 2018-12-05 RX ADMIN — HEPARIN SODIUM 5000 UNIT(S): 5000 INJECTION INTRAVENOUS; SUBCUTANEOUS at 21:29

## 2018-12-05 RX ADMIN — Medication 25 MILLIGRAM(S): at 05:49

## 2018-12-05 RX ADMIN — Medication 100 MILLIGRAM(S): at 18:33

## 2018-12-05 RX ADMIN — CYCLOBENZAPRINE HYDROCHLORIDE 5 MILLIGRAM(S): 10 TABLET, FILM COATED ORAL at 18:35

## 2018-12-05 RX ADMIN — SODIUM CHLORIDE 1000 MILLILITER(S): 9 INJECTION INTRAMUSCULAR; INTRAVENOUS; SUBCUTANEOUS at 00:26

## 2018-12-05 RX ADMIN — Medication 1 TABLET(S): at 18:33

## 2018-12-05 RX ADMIN — AZITHROMYCIN 250 MILLIGRAM(S): 500 TABLET, FILM COATED ORAL at 00:26

## 2018-12-05 RX ADMIN — HEPARIN SODIUM 5000 UNIT(S): 5000 INJECTION INTRAVENOUS; SUBCUTANEOUS at 05:49

## 2018-12-05 RX ADMIN — Medication 1 TABLET(S): at 13:32

## 2018-12-05 RX ADMIN — Medication 1 TABLET(S): at 10:04

## 2018-12-05 RX ADMIN — Medication 20 MILLIGRAM(S): at 05:49

## 2018-12-05 RX ADMIN — ATORVASTATIN CALCIUM 20 MILLIGRAM(S): 80 TABLET, FILM COATED ORAL at 21:29

## 2018-12-05 NOTE — H&P ADULT - PROBLEM SELECTOR PLAN 6
- DVT ppx heparin subq  - DASH diet  - Dispo pending resolution of symptoms    Mckayla Peralta  PGY 2 Night Admit  Pager [phone]

## 2018-12-05 NOTE — PROGRESS NOTE ADULT - ASSESSMENT
This is a 72 y/o M with a PMH significant for CAD s/p stent, CHF, Colon CA in remission who presents with back pain and dyspnea, admitted for sepsis secondary to a urinary tract infection.

## 2018-12-05 NOTE — H&P ADULT - NSHPREVIEWOFSYSTEMS_GEN_ALL_CORE
REVIEW OF SYSTEMS    CONSTITUTIONAL:  Denies fevers or chills  HEENT: Denies double vision, vision loss, nasal congestion  SKIN:  Denies rash or itching.  CARDIOVASCULAR:  Denies chest pain  RESPIRATORY:  + SOB and orthopnea, denies cough or sputum.  GASTROINTESTINAL:  Denies anorexia, nausea, vomiting or diarrhea. No abdominal pain or blood.  GENITOURINARY:  Denies any hematuria, dysuria  NEUROLOGICAL:  Denies headache, dizziness, numbness, tingling, or weakness   MUSCULOSKELETAL:  Denies muscle, back pain, joint pain or stiffness.  HEMATOLOGIC:  Denies anemia, bleeding or bruising.  LYMPHATICS:  Denies enlarged nodes.   PSYCHIATRIC:  Denies history of depression or anxiety.  ENDOCRINOLOGIC:  Denies reports of sweating, cold or heat intolerance. No polyuria or polydipsia.  ALLERGIES:  Denies history of asthma, hives, eczema or rhinitis. REVIEW OF SYSTEMS    CONSTITUTIONAL:  Denies fevers but said that he had chills with some diaphoresis on Sunday, none since  HEENT: Denies double vision, vision loss, nasal congestion  SKIN:  Denies rash or itching.  CARDIOVASCULAR:  Denies chest pain  RESPIRATORY:  + SOB and orthopnea with spasms of his lower back, denies cough or sputum.  GASTROINTESTINAL: Has decreased appetite, Denies nausea, vomiting or diarrhea. No abdominal pain or blood.  GENITOURINARY:  Denies any hematuria, dysuria  NEUROLOGICAL:  Denies headache, dizziness, numbness, tingling, or weakness   MUSCULOSKELETAL: Spasms of lower back  HEMATOLOGIC:  Denies anemia, bleeding or bruising.  LYMPHATICS:  Denies enlarged nodes.   ENDOCRINOLOGIC:  Denies reports of sweating, cold or heat intolerance. No polyuria or polydipsia.  ALLERGIES:  Denies history of asthma, hives, eczema or rhinitis.

## 2018-12-05 NOTE — H&P ADULT - ATTENDING COMMENTS
Patient seen and examined on 12/5/18, case discussed with Dr. Mckayla Peralta, agree with assessment and plan as above.

## 2018-12-05 NOTE — H&P ADULT - ASSESSMENT
This is a 74 y/o M with a PMH significant for CAD s/p stent, CHF, Colon CA in remission who presents with back pain and dyspnea, admitted for sepsis secondary to a urinary tract infection.

## 2018-12-05 NOTE — CONSULT NOTE ADULT - SUBJECTIVE AND OBJECTIVE BOX
Patient is a 73y Male w/ hx of HTN, HLD, prostate CA s/p RT, anal CA s/p resection, CAD s/p MI, and CHF who presented yesterday to [facility] ED and was admitted with presumed sepsis 2/2 UTI. Patient has had severe back pain over the past week and was seen at [facility] earlier in the day of admission (), but ended up leaving the [facility] before getting an MRI, he then came to [facility] later that same day. Patient also has dyspnea and is being worked up for CHF exacerbation. Denies trauma/HS/LOC. Denies pain/injury elsewhere. Denies numbness/tingling/paresthesias/weakness. Denies bowel/bladder incontinence. Denies fevers/chills. No other complaints at this time. Patient has seen doctors for his spine before, says he has had MRI studies, but does not remember if they have been of his spine.    HEALTH ISSUES - PROBLEM Dx:  Need for prophylactic measure: Need for prophylactic measure  Colon cancer: Colon cancer  CAD (coronary artery disease): CAD (coronary artery disease)  Acute low back pain without sciatica, unspecified back pain laterality: Acute low back pain without sciatica, unspecified back pain laterality  Chronic systolic congestive heart failure: Chronic systolic congestive heart failure  Sepsis due to urinary tract infection: Sepsis due to urinary tract infection  Chronic congestive heart failure, unspecified heart failure type: Chronic congestive heart failure, unspecified heart failure type    MEDICATIONS  (STANDING):  allopurinol 100 milliGRAM(s) Oral two times a day  atorvastatin 20 milliGRAM(s) Oral at bedtime  cefTRIAXone   IVPB 1 Gram(s) IV Intermittent every 24 hours  clopidogrel Tablet 75 milliGRAM(s) Oral daily  cyclobenzaprine 5 milliGRAM(s) Oral two times a day  furosemide    Tablet 20 milliGRAM(s) Oral daily  heparin  Injectable 5000 Unit(s) SubCutaneous every 8 hours  metoprolol succinate ER 25 milliGRAM(s) Oral daily    Allergies  No Known Allergies    PAST MEDICAL & SURGICAL HISTORY:  Gout  HLD (hyperlipidemia)  Colon carcinoma: Sp Ileostomy and reverasl with chemo/rt ()  CAD (coronary artery disease): SP ptca w stent 2010  HTN (Hypertension)  S/P colon resection                        13.9   12.98 )-----------( 239      ( 05 Dec 2018 06:00 )             41.1     05 Dec 2018 06:00    137    |  100    |  12     ----------------------------<  128    3.9     |  22     |  0.84     Ca    9.1        05 Dec 2018 06:00  Phos  2.3       05 Dec 2018 06:00  Mg     2.0       05 Dec 2018 06:00    TPro  7.6    /  Alb  4.0    /  TBili  1.1    /  DBili  x      /  AST  18     /  ALT  31     /  AlkPhos  83     04 Dec 2018 19:30    PT/INR - ( 04 Dec 2018 19:30 )   PT: 13.5 SEC;   INR: 1.21       PTT - ( 04 Dec 2018 19:30 )  PTT:31.2 SEC    Urinalysis Basic - ( 04 Dec 2018 21:13 )  Color: YELLOW / Appearance: Lt TURBID / S.029 / pH: 6.0  Gluc: NEGATIVE / Ketone: SMALL  / Bili: NEGATIVE / Urobili: NORMAL   Blood: SMALL / Protein: 70 / Nitrite: POSITIVE   Leuk Esterase: LARGE / RBC: 3-5 / WBC >50   Sq Epi: OCC / Non Sq Epi: x / Bacteria: MANY    Vital Signs Last 24 Hrs  T(C): 37.1 (18 @ 15:50), Max: 37.1 (18 @ 15:50)  T(F): 98.8 (18 @ 15:50), Max: 98.8 (18 @ 15:50)  HR: 95 (18 @ 15:50) (93 - 109)  BP: 132/69 (18 @ 15:50) (116/68 - 146/80)  RR: 20 (18 @ 15:50) (15 - 21)  SpO2: 96% (18 @ 15:50) (96% - 98%)    Gen: NAD  Spine PE:  Skin intact  No gross deformity  No midline TTP C/T/L/S spine  No bony step offs  Negative clonus  Negative babinski  Negative santiago  No saddle anesthesia    Motor:                   C5                C6              C7               C8           T1   R            5/5                5/5            5/5             5/5          5/5  L             5/5               5/5             5/5             5/5          5/5                L2             L3             L4               L5            S1  R         5/5           5/5          5/5             5/5           5/5  L          5/5          5/5           5/5             5/5           5/5    Sensory:            C5         C6         C7      C8       T1        (0=absent, 1=impaired, 2=normal, NT=not testable)  R         2            2           2        2         2  L          2            2           2        2         2               L2          L3         L4      L5       S1         (0=absent, 1=impaired, 2=normal, NT=not testable)  R         2            2            2        2        2  L          2            2           2        2         2    Imaging:   CT A/P does not show an spondylolisthesis or fractures of the lumbar spine, some degenerative changes present    A/P: 73y Male with low back pain    Pain control  WBAT with assistive devices as needed  PT/OT/OOB  FU imaging: XR lumbar spine, MRI LS spine  DVT ppx per primary team  Further workup per primary team    Patient and imaging discussed with Dr. Erica Del Toro MD

## 2018-12-05 NOTE — H&P ADULT - NSHPSOCIALHISTORY_GEN_ALL_CORE
Patient lives with his wife and is able to carry out all of his ADLs. He has his own insurance agency. He drinks 1-2 glasses of wine daily, and denies any tobacco or recreational drug use.

## 2018-12-05 NOTE — H&P ADULT - NSHPLABSRESULTS_GEN_ALL_CORE
LABORATORY                           14.7   14.66 )-----------( 267      ( 04 Dec 2018 19:30 )             45.4         12    137  |  99  |  12  ----------------------------<  122<H>  3.9   |  23  |  0.82    Ca    9.9      04 Dec 2018 19:30    TPro  7.6  /  Alb  4.0  /  TBili  1.1  /  DBili  x   /  AST  18  /  ALT  31  /  AlkPhos  83  12    Urinalysis Basic - ( 04 Dec 2018 21:13 )    Color: YELLOW / Appearance: Lt TURBID / S.029 / pH: 6.0  Gluc: NEGATIVE / Ketone: SMALL  / Bili: NEGATIVE / Urobili: NORMAL   Blood: SMALL / Protein: 70 / Nitrite: POSITIVE   Leuk Esterase: LARGE / RBC: 3-5 / WBC >50   Sq Epi: OCC / Non Sq Epi: x / Bacteria: MANY        Troponin T, High Sensitivity (18 @ 00:10)    Troponin T, High Sensitivity: 13: _______________________________________________________    Troponin T, High Sensitivity (18 @ 19:30)    Troponin T, High Sensitivity: 11: _______________________________________________________    Serum Pro-Brain Natriuretic Peptide (18 @ 19:30)    Serum Pro-Brain Natriuretic Peptide: 188.1: ACUTE CONGESTIVE HEART FAILURE is UNLIKELY if NT-proBNP is < 300 pg/mL.    CONSIDER ACUTE CONGESTIVE HEART FAILURE if:    AGE                NT-proBNP RESULT  ---                -------------  < 50 YEARS      >  450 pg/mL  50 - 75 YEARS      >  900 pg/mL  > 75 YEARS      > 1800 pg/mL    EKG: normal sinus rhythm with RBBB    RADIOLOGY    < from: CT Angio Chest w/ IV Cont (18 @ 22:45) >    IMPRESSION:   Limited opacification of the pulmonary arterial tree.No pulmonary   embolism to the level of the lobar pulmonary arteries.  Hypodense hepatic lesions, indeterminate given history of colon cancer.   Further evaluation with contrast-enhanced CT or MRI of the abdomen is   recommended.      < end of copied text >    < from: Xray Chest 2 Views PA/Lat (18 @ 20:48) >        INTERPRETATION:  small left pleural effusion    < end of copied text > LABORATORY                           14.7   14.66 )-----------( 267      ( 04 Dec 2018 19:30 )             45.4       12    137  |  99  |  12  ----------------------------<  122<H>  3.9   |  23  |  0.82    Ca    9.9      04 Dec 2018 19:30    TPro  7.6  /  Alb  4.0  /  TBili  1.1  /  DBili  x   /  AST  18  /  ALT  31  /  AlkPhos  83  12    Urinalysis Basic - ( 04 Dec 2018 21:13 )  Color: YELLOW / Appearance: Lt TURBID / S.029 / pH: 6.0  Gluc: NEGATIVE / Ketone: SMALL  / Bili: NEGATIVE / Urobili: NORMAL   Blood: SMALL / Protein: 70 / Nitrite: POSITIVE   Leuk Esterase: LARGE / RBC: 3-5 / WBC >50   Sq Epi: OCC / Non Sq Epi: x / Bacteria: MANY        Troponin T, High Sensitivity (18 @ 00:10)    Troponin T, High Sensitivity: 13: _______________________________________________________    Troponin T, High Sensitivity (18 @ 19:30)    Troponin T, High Sensitivity: 11: _______________________________________________________    Serum Pro-Brain Natriuretic Peptide (18 @ 19:30)    Serum Pro-Brain Natriuretic Peptide: 188.1: ACUTE CONGESTIVE HEART FAILURE is UNLIKELY if NT-proBNP is < 300 pg/mL.    CONSIDER ACUTE CONGESTIVE HEART FAILURE if:    AGE                NT-proBNP RESULT  ---                -------------  < 50 YEARS      >  450 pg/mL  50 - 75 YEARS      >  900 pg/mL  > 75 YEARS      > 1800 pg/mL    EKG: normal sinus rhythm with RBBB, QTc 523    RADIOLOGY    < from: CT Angio Chest w/ IV Cont (12.04.18 @ 22:45) >    IMPRESSION:   Limited opacification of the pulmonary arterial tree. No pulmonary   embolism to the level of the lobar pulmonary arteries.  Hypodense hepatic lesions, indeterminate given history of colon cancer.   Further evaluation with contrast-enhanced CT or MRI of the abdomen is   recommended.    < end of copied text >    < from: Xray Chest 2 Views PA/Lat (18 @ 20:48) >  INTERPRETATION:  small left pleural effusion  < end of copied text > LABORATORY                           14.7   14.66 )-----------( 267      ( 04 Dec 2018 19:30 )             45.4       12    137  |  99  |  12  ----------------------------<  122<H>  3.9   |  23  |  0.82    Ca    9.9      04 Dec 2018 19:30    TPro  7.6  /  Alb  4.0  /  TBili  1.1  /  DBili  x   /  AST  18  /  ALT  31  /  AlkPhos  83  12    Urinalysis Basic - ( 04 Dec 2018 21:13 )  Color: YELLOW / Appearance: Lt TURBID / S.029 / pH: 6.0  Gluc: NEGATIVE / Ketone: SMALL  / Bili: NEGATIVE / Urobili: NORMAL   Blood: SMALL / Protein: 70 / Nitrite: POSITIVE   Leuk Esterase: LARGE / RBC: 3-5 / WBC >50   Sq Epi: OCC / Non Sq Epi: x / Bacteria: MANY        Troponin T, High Sensitivity (18 @ 00:10)    Troponin T, High Sensitivity: 13: _______________________________________________________    Troponin T, High Sensitivity (18 @ 19:30)    Troponin T, High Sensitivity: 11: _______________________________________________________    Serum Pro-Brain Natriuretic Peptide (18 @ 19:30)    Serum Pro-Brain Natriuretic Peptide: 188.1: ACUTE CONGESTIVE HEART FAILURE is UNLIKELY if NT-proBNP is < 300 pg/mL.    CONSIDER ACUTE CONGESTIVE HEART FAILURE if:    AGE                NT-proBNP RESULT  ---                -------------  < 50 YEARS      >  450 pg/mL  50 - 75 YEARS      >  900 pg/mL  > 75 YEARS      > 1800 pg/mL    EKG: normal sinus rhythm with RBBB, QTc 523, compared to prior, no acute changes noted    RADIOLOGY    < from: CT Angio Chest w/ IV Cont (18 @ 22:45) >    IMPRESSION:   Limited opacification of the pulmonary arterial tree. No pulmonary   embolism to the level of the lobar pulmonary arteries.  Hypodense hepatic lesions, indeterminate given history of colon cancer.   Further evaluation with contrast-enhanced CT or MRI of the abdomen is   recommended.    < end of copied text >    < from: Xray Chest 2 Views PA/Lat (18 @ 20:48) >  INTERPRETATION:  small left pleural effusion  < end of copied text >

## 2018-12-05 NOTE — H&P ADULT - PROBLEM SELECTOR PLAN 1
- patient meets sepsis criteria with tachycardia, tachypnea, leukocytosis and likely source of urine given grossly positive UA  - urine culture pending  - patient received 1L of fluid in the ED along with ceftriaxone x 1 and azithromycin  - suspicion for PNA is low, will continue with ceftriaxone daily and narrow coverage once sensitivities from culture return - patient meets sepsis criteria with tachycardia, tachypnea, leukocytosis and likely source of urine given grossly positive UA  - f/u UCx  - patient received 1L of fluid in the ED along with ceftriaxone x 1 and azithromycin, suspicion for PNA is low, will continue with ceftriaxone daily and narrow coverage once sensitivities from culture return

## 2018-12-05 NOTE — H&P ADULT - HISTORY OF PRESENT ILLNESS
73 male with CAD s/p stent in 2010HTN,HLD,ICM,,CHF Ef 45%,,colon Ca s/p ileostomy 2012 reversal 2013 who presents with intermittent severe mid back spasm like pain  at time radiating  left under axillae, chest  and abdomen associate with sob and orthopnea   since  last week. Patient reports that he had 3 severe spasm attack since last week temporarily resolved w/ valium . His  chest symptoms are worse w/ inspiration.  Of note, Patient presented to the [facility] ED yesterday with similar symptoms and admitted for ACS w/u and AMA'd when he was told the admitting team wanted an MR of his spine because he was concerned he would not be able to tolerate MRI study 2/2 back pain lying flat. CTA chest at [facility] negative for PE but study limited by motion and contrast filling per their report. Cardiology  consulted for CHF vs r/o ACS. Patient denies fever chills, N/V, dizziness currently denies chest pain but have intermittent mild back spasm which relieved by bending forward This is a 72 y/o M with a PMH significant for CAD s/p stent in 2010, Colon CA s/p resection, radiation, ileostomy placement and reversal, and HTN who is presenting to the hospital with back pain and dyspnea. The patient states that last Sunday he developed back spasms in his lower back that were lasting 10-15 minutes. He took some valium and his symptoms resolved. He was asymptomatic the rest of the week until this Sunday when the spasms returned. He took valium along with advil but the spasms persisted. He went to his orthopedist's office on Monday who did xrays of his back along with a CT scan but was concerned about his dyspnea so told him to come to go to the ER for further evaluation. He went to the [facility] ED on Monday and was admitted for rule out ACS. He had a CTA of the chest that was negative for PE and negative troponins. Given his back pain, the team wanted to do an MRI but the patient was not able to tolerate it secondary to pain and inability to lie flat so he left AMA. He returned to the [facility] ED today after calling his cardiologist Dr. Long who recommended he return for evaluation. He continues to complain of lower back pain that radiates across his back and wraps around the left side of his abdomen. This is associated with intermittent dyspnea and orthopnea. He denies any fevers, chills, dizziness, CP, abdominal pain, nausea, vomiting, diarrhea, or constipation. Denies melena or hematochezia.     In the ED, his VS were T: 97.8, HR: 100-109, BP: 128/73, RR: 22 while sating 99% on RA. He was given ceftriaxone and azithromycin along with 1L of normal saline and lasix 40mg IV x 1.

## 2018-12-05 NOTE — H&P ADULT - NSHPPHYSICALEXAM_GEN_ALL_CORE
Vital Signs Last 24 Hrs  T(C): 36.8 (05 Dec 2018 02:33), Max: 36.8 (05 Dec 2018 02:33)  T(F): 98.2 (05 Dec 2018 02:33), Max: 98.2 (05 Dec 2018 02:33)  HR: 97 (05 Dec 2018 02:33) (78 - 109)  BP: 133/74 (05 Dec 2018 02:33) (128/73 - 150/95)  BP(mean): --  RR: 15 (05 Dec 2018 02:33) (15 - 22)  SpO2: 97% (05 Dec 2018 02:33) (95% - 100%)    PHYSICAL EXAM:    GENERAL: Comfortable, no acute distress   HEAD:  Normocephalic, atraumatic  EYES: EOMI, PERRLA  HEENT: Moist mucous membranes  NECK: Supple, No JVD  NERVOUS SYSTEM:  CN 2-12 intact b/l. Alert & Oriented X3, Motor Strength 5/5 B/L upper and lower extremities. Sensation intact B/L  CHEST/LUNG: Clear to auscultation bilaterally  HEART: Regular rate and rhythm, no murmur   ABDOMEN: Soft, Nontender, Nondistended, Bowel sounds present  EXTREMITIES:   No clubbing, cyanosis, or edema  MUSCULOSKELETAL: No muscle tenderness, no joint tenderness  SKIN: warm and dry, no rash Vital Signs Last 24 Hrs  T(C): 36.8 (05 Dec 2018 02:33), Max: 36.8 (05 Dec 2018 02:33)  T(F): 98.2 (05 Dec 2018 02:33), Max: 98.2 (05 Dec 2018 02:33)  HR: 97 (05 Dec 2018 02:33) (78 - 109)  BP: 133/74 (05 Dec 2018 02:33) (128/73 - 150/95)  BP(mean): --  RR: 15 (05 Dec 2018 02:33) (15 - 22)  SpO2: 97% (05 Dec 2018 02:33) (95% - 100%)    PHYSICAL EXAM:    GENERAL: Comfortable, no acute distress   EYES: EOMI, PERRLA, clear sclera  HEENT: Moist mucous membranes, no rhinorrhea  NECK: Supple, No JVD  NERVOUS SYSTEM:  CN 2-12 intact b/l. Alert & Oriented X3, Motor Strength 5/5 B/L upper and lower extremities. Sensation intact B/L  CHEST/LUNG: Clear to auscultation bilaterally  HEART: Regular rate and rhythm, no murmur   ABDOMEN: Soft, Nontender, Nondistended, Bowel sounds present  EXTREMITIES:  No clubbing, cyanosis, or edema  MUSCULOSKELETAL: No muscle tenderness, no joint tenderness  SKIN: warm and dry, no rash

## 2018-12-05 NOTE — PROGRESS NOTE ADULT - PROBLEM SELECTOR PLAN 3
Patient with acute onset of back spasms in the last week. Unlikely to be pyelonephritis given lack of CVA tenderness and patient's non toxic appearance.  - no renal stone seen on CT abdomen/pelvis but study limited by previous contrast from CTA  - will tb w/ pt's orthopedist to get results of imaging done on Monday prior to repeating further imaging of the back inpatient Patient with acute onset of back spasms in the last week. Unlikely to be pyelonephritis given lack of CVA tenderness and patient's non toxic appearance.  - no renal stone seen on CT abdomen/pelvis but study limited by previous contrast from CTA  - will tb w/ pt's orthopedist to get results of imaging done on Monday prior to repeating further imaging of the back inpatient  - flexeril 5mg bid for spasms

## 2018-12-05 NOTE — H&P ADULT - PROBLEM SELECTOR PLAN 3
- patient with acute onset of back spasms in the last week  - pain unlikely to be pyelonephritis given lack of CVA tenderness and patient's non toxic appearance  - no renal stone seen on CT abdomen/pelvis but will wait for final read   - can call a patient's orthopedist in the AM to get results of imaging done on monday prior to repeating further imaging of the back inpatient

## 2018-12-05 NOTE — H&P ADULT - PROBLEM SELECTOR PLAN 4
- hx of stent in 2010  - continue - hx of stent in 2010  - continue plavix, atorvastatin, metoprolol   - holding lisinopril given patient had two loads of contrast within 24 hours and is at high risk for developing ELA

## 2018-12-05 NOTE — H&P ADULT - PROBLEM SELECTOR PLAN 2
- patient with history of CHF, per outpatient records last nuclear stress test was done in Jan 2018 with EF of 38%  - patient does not appear to be in an acute CHF exacerbation given low proBNP, lack of pulmonary edema on chest xray, and relatively benign physical exam (small L pleural effusion, mild peripheral edema, good O2 sats)  - dyspnea and orthopnea however could be secondary to worsening LV function so TTE ordered   - continue home dose of lasix 20mg daily  - monitor BMP carefully, replete electrolytes as needed

## 2018-12-06 LAB
BASOPHILS # BLD AUTO: 0.03 K/UL — SIGNIFICANT CHANGE UP (ref 0–0.2)
BASOPHILS NFR BLD AUTO: 0.2 % — SIGNIFICANT CHANGE UP (ref 0–2)
BUN SERPL-MCNC: 12 MG/DL — SIGNIFICANT CHANGE UP (ref 7–23)
CALCIUM SERPL-MCNC: 8.9 MG/DL — SIGNIFICANT CHANGE UP (ref 8.4–10.5)
CHLORIDE SERPL-SCNC: 95 MMOL/L — LOW (ref 98–107)
CO2 SERPL-SCNC: 22 MMOL/L — SIGNIFICANT CHANGE UP (ref 22–31)
CREAT SERPL-MCNC: 0.73 MG/DL — SIGNIFICANT CHANGE UP (ref 0.5–1.3)
EOSINOPHIL # BLD AUTO: 0.07 K/UL — SIGNIFICANT CHANGE UP (ref 0–0.5)
EOSINOPHIL NFR BLD AUTO: 0.6 % — SIGNIFICANT CHANGE UP (ref 0–6)
GLUCOSE SERPL-MCNC: 115 MG/DL — HIGH (ref 70–99)
HCT VFR BLD CALC: 42.2 % — SIGNIFICANT CHANGE UP (ref 39–50)
HGB BLD-MCNC: 13.9 G/DL — SIGNIFICANT CHANGE UP (ref 13–17)
IMM GRANULOCYTES # BLD AUTO: 0.1 # — SIGNIFICANT CHANGE UP
IMM GRANULOCYTES NFR BLD AUTO: 0.8 % — SIGNIFICANT CHANGE UP (ref 0–1.5)
LYMPHOCYTES # BLD AUTO: 0.76 K/UL — LOW (ref 1–3.3)
LYMPHOCYTES # BLD AUTO: 6 % — LOW (ref 13–44)
MAGNESIUM SERPL-MCNC: 2 MG/DL — SIGNIFICANT CHANGE UP (ref 1.6–2.6)
MCHC RBC-ENTMCNC: 29.4 PG — SIGNIFICANT CHANGE UP (ref 27–34)
MCHC RBC-ENTMCNC: 32.9 % — SIGNIFICANT CHANGE UP (ref 32–36)
MCV RBC AUTO: 89.4 FL — SIGNIFICANT CHANGE UP (ref 80–100)
MONOCYTES # BLD AUTO: 1.13 K/UL — HIGH (ref 0–0.9)
MONOCYTES NFR BLD AUTO: 9 % — SIGNIFICANT CHANGE UP (ref 2–14)
NEUTROPHILS # BLD AUTO: 10.52 K/UL — HIGH (ref 1.8–7.4)
NEUTROPHILS NFR BLD AUTO: 83.4 % — HIGH (ref 43–77)
NRBC # FLD: 0 — SIGNIFICANT CHANGE UP
PHOSPHATE SERPL-MCNC: 2.8 MG/DL — SIGNIFICANT CHANGE UP (ref 2.5–4.5)
PLATELET # BLD AUTO: 251 K/UL — SIGNIFICANT CHANGE UP (ref 150–400)
PMV BLD: 9.6 FL — SIGNIFICANT CHANGE UP (ref 7–13)
POTASSIUM SERPL-MCNC: 3.4 MMOL/L — LOW (ref 3.5–5.3)
POTASSIUM SERPL-SCNC: 3.4 MMOL/L — LOW (ref 3.5–5.3)
RBC # BLD: 4.72 M/UL — SIGNIFICANT CHANGE UP (ref 4.2–5.8)
RBC # FLD: 13.7 % — SIGNIFICANT CHANGE UP (ref 10.3–14.5)
SODIUM SERPL-SCNC: 133 MMOL/L — LOW (ref 135–145)
SPECIMEN SOURCE: SIGNIFICANT CHANGE UP
WBC # BLD: 12.61 K/UL — HIGH (ref 3.8–10.5)
WBC # FLD AUTO: 12.61 K/UL — HIGH (ref 3.8–10.5)

## 2018-12-06 PROCEDURE — 99232 SBSQ HOSP IP/OBS MODERATE 35: CPT | Mod: GC

## 2018-12-06 PROCEDURE — 99233 SBSQ HOSP IP/OBS HIGH 50: CPT | Mod: GC

## 2018-12-06 RX ORDER — LISINOPRIL 2.5 MG/1
2.5 TABLET ORAL DAILY
Qty: 0 | Refills: 0 | Status: DISCONTINUED | OUTPATIENT
Start: 2018-12-06 | End: 2018-12-08

## 2018-12-06 RX ORDER — POTASSIUM CHLORIDE 20 MEQ
20 PACKET (EA) ORAL ONCE
Qty: 0 | Refills: 0 | Status: COMPLETED | OUTPATIENT
Start: 2018-12-06 | End: 2018-12-06

## 2018-12-06 RX ADMIN — CYCLOBENZAPRINE HYDROCHLORIDE 5 MILLIGRAM(S): 10 TABLET, FILM COATED ORAL at 17:59

## 2018-12-06 RX ADMIN — ATORVASTATIN CALCIUM 20 MILLIGRAM(S): 80 TABLET, FILM COATED ORAL at 22:32

## 2018-12-06 RX ADMIN — Medication 25 MILLIGRAM(S): at 06:05

## 2018-12-06 RX ADMIN — Medication 100 MILLIGRAM(S): at 17:59

## 2018-12-06 RX ADMIN — Medication 20 MILLIGRAM(S): at 06:05

## 2018-12-06 RX ADMIN — Medication 20 MILLIEQUIVALENT(S): at 11:03

## 2018-12-06 RX ADMIN — HEPARIN SODIUM 5000 UNIT(S): 5000 INJECTION INTRAVENOUS; SUBCUTANEOUS at 06:05

## 2018-12-06 RX ADMIN — LISINOPRIL 2.5 MILLIGRAM(S): 2.5 TABLET ORAL at 11:03

## 2018-12-06 RX ADMIN — CLOPIDOGREL BISULFATE 75 MILLIGRAM(S): 75 TABLET, FILM COATED ORAL at 11:03

## 2018-12-06 RX ADMIN — Medication 100 MILLIGRAM(S): at 06:05

## 2018-12-06 RX ADMIN — HEPARIN SODIUM 5000 UNIT(S): 5000 INJECTION INTRAVENOUS; SUBCUTANEOUS at 22:33

## 2018-12-06 RX ADMIN — CYCLOBENZAPRINE HYDROCHLORIDE 5 MILLIGRAM(S): 10 TABLET, FILM COATED ORAL at 06:05

## 2018-12-06 RX ADMIN — HEPARIN SODIUM 5000 UNIT(S): 5000 INJECTION INTRAVENOUS; SUBCUTANEOUS at 14:04

## 2018-12-06 RX ADMIN — CEFTRIAXONE 100 GRAM(S): 500 INJECTION, POWDER, FOR SOLUTION INTRAMUSCULAR; INTRAVENOUS at 22:33

## 2018-12-06 NOTE — PROGRESS NOTE ADULT - PROBLEM SELECTOR PLAN 3
Patient with acute onset of back spasms in the last week. Unlikely to be pyelonephritis given lack of CVA tenderness and patient's non toxic appearance.  - no renal stone seen on CT abdomen/pelvis but study limited by previous contrast from CTA  - per pt's orthopedist, pt was seen for the first time on 12/3, no imaging was done  - flexeril 5mg bid for spasms  - pt w/o alarm s/s, spasms appear to be more MSK, can f/u obtain MRI as outpatient for further evaluation

## 2018-12-07 ENCOUNTER — TRANSCRIPTION ENCOUNTER (OUTPATIENT)
Age: 73
End: 2018-12-07

## 2018-12-07 LAB
-  AMIKACIN: SIGNIFICANT CHANGE UP
-  AMPICILLIN/SULBACTAM: SIGNIFICANT CHANGE UP
-  AMPICILLIN: SIGNIFICANT CHANGE UP
-  AZTREONAM: SIGNIFICANT CHANGE UP
-  CEFAZOLIN: SIGNIFICANT CHANGE UP
-  CEFEPIME: SIGNIFICANT CHANGE UP
-  CEFOXITIN: SIGNIFICANT CHANGE UP
-  CEFTAZIDIME: SIGNIFICANT CHANGE UP
-  CEFTRIAXONE: SIGNIFICANT CHANGE UP
-  CIPROFLOXACIN: SIGNIFICANT CHANGE UP
-  ERTAPENEM: SIGNIFICANT CHANGE UP
-  GENTAMICIN: SIGNIFICANT CHANGE UP
-  IMIPENEM: SIGNIFICANT CHANGE UP
-  LEVOFLOXACIN: SIGNIFICANT CHANGE UP
-  MEROPENEM: SIGNIFICANT CHANGE UP
-  NITROFURANTOIN: SIGNIFICANT CHANGE UP
-  PIPERACILLIN/TAZOBACTAM: SIGNIFICANT CHANGE UP
-  TIGECYCLINE: SIGNIFICANT CHANGE UP
-  TOBRAMYCIN: SIGNIFICANT CHANGE UP
-  TRIMETHOPRIM/SULFAMETHOXAZOLE: SIGNIFICANT CHANGE UP
BACTERIA UR CULT: SIGNIFICANT CHANGE UP
BASOPHILS # BLD AUTO: 0.03 K/UL — SIGNIFICANT CHANGE UP (ref 0–0.2)
BASOPHILS NFR BLD AUTO: 0.2 % — SIGNIFICANT CHANGE UP (ref 0–2)
BUN SERPL-MCNC: 13 MG/DL — SIGNIFICANT CHANGE UP (ref 7–23)
CALCIUM SERPL-MCNC: 9.3 MG/DL — SIGNIFICANT CHANGE UP (ref 8.4–10.5)
CHLORIDE SERPL-SCNC: 97 MMOL/L — LOW (ref 98–107)
CO2 SERPL-SCNC: 23 MMOL/L — SIGNIFICANT CHANGE UP (ref 22–31)
CREAT SERPL-MCNC: 0.77 MG/DL — SIGNIFICANT CHANGE UP (ref 0.5–1.3)
EOSINOPHIL # BLD AUTO: 0.05 K/UL — SIGNIFICANT CHANGE UP (ref 0–0.5)
EOSINOPHIL NFR BLD AUTO: 0.4 % — SIGNIFICANT CHANGE UP (ref 0–6)
GLUCOSE SERPL-MCNC: 123 MG/DL — HIGH (ref 70–99)
HCT VFR BLD CALC: 43.6 % — SIGNIFICANT CHANGE UP (ref 39–50)
HGB BLD-MCNC: 14.2 G/DL — SIGNIFICANT CHANGE UP (ref 13–17)
IMM GRANULOCYTES # BLD AUTO: 0.12 # — SIGNIFICANT CHANGE UP
IMM GRANULOCYTES NFR BLD AUTO: 0.9 % — SIGNIFICANT CHANGE UP (ref 0–1.5)
LYMPHOCYTES # BLD AUTO: 0.68 K/UL — LOW (ref 1–3.3)
LYMPHOCYTES # BLD AUTO: 4.9 % — LOW (ref 13–44)
MAGNESIUM SERPL-MCNC: 2.4 MG/DL — SIGNIFICANT CHANGE UP (ref 1.6–2.6)
MCHC RBC-ENTMCNC: 29.3 PG — SIGNIFICANT CHANGE UP (ref 27–34)
MCHC RBC-ENTMCNC: 32.6 % — SIGNIFICANT CHANGE UP (ref 32–36)
MCV RBC AUTO: 90.1 FL — SIGNIFICANT CHANGE UP (ref 80–100)
METHOD TYPE: SIGNIFICANT CHANGE UP
MONOCYTES # BLD AUTO: 1.33 K/UL — HIGH (ref 0–0.9)
MONOCYTES NFR BLD AUTO: 9.7 % — SIGNIFICANT CHANGE UP (ref 2–14)
NEUTROPHILS # BLD AUTO: 11.56 K/UL — HIGH (ref 1.8–7.4)
NEUTROPHILS NFR BLD AUTO: 83.9 % — HIGH (ref 43–77)
NRBC # FLD: 0 — SIGNIFICANT CHANGE UP
ORGANISM # SPEC MICROSCOPIC CNT: SIGNIFICANT CHANGE UP
ORGANISM # SPEC MICROSCOPIC CNT: SIGNIFICANT CHANGE UP
PHOSPHATE SERPL-MCNC: 3 MG/DL — SIGNIFICANT CHANGE UP (ref 2.5–4.5)
PLATELET # BLD AUTO: 280 K/UL — SIGNIFICANT CHANGE UP (ref 150–400)
PMV BLD: 9.6 FL — SIGNIFICANT CHANGE UP (ref 7–13)
POTASSIUM SERPL-MCNC: 3.8 MMOL/L — SIGNIFICANT CHANGE UP (ref 3.5–5.3)
POTASSIUM SERPL-SCNC: 3.8 MMOL/L — SIGNIFICANT CHANGE UP (ref 3.5–5.3)
RBC # BLD: 4.84 M/UL — SIGNIFICANT CHANGE UP (ref 4.2–5.8)
RBC # FLD: 13.7 % — SIGNIFICANT CHANGE UP (ref 10.3–14.5)
SODIUM SERPL-SCNC: 137 MMOL/L — SIGNIFICANT CHANGE UP (ref 135–145)
WBC # BLD: 13.77 K/UL — HIGH (ref 3.8–10.5)
WBC # FLD AUTO: 13.77 K/UL — HIGH (ref 3.8–10.5)

## 2018-12-07 PROCEDURE — 72148 MRI LUMBAR SPINE W/O DYE: CPT | Mod: 26

## 2018-12-07 PROCEDURE — 99233 SBSQ HOSP IP/OBS HIGH 50: CPT | Mod: GC

## 2018-12-07 RX ORDER — CYCLOBENZAPRINE HYDROCHLORIDE 10 MG/1
1 TABLET, FILM COATED ORAL
Qty: 28 | Refills: 0
Start: 2018-12-07 | End: 2018-12-20

## 2018-12-07 RX ADMIN — Medication 20 MILLIGRAM(S): at 05:50

## 2018-12-07 RX ADMIN — ATORVASTATIN CALCIUM 20 MILLIGRAM(S): 80 TABLET, FILM COATED ORAL at 21:59

## 2018-12-07 RX ADMIN — Medication 100 MILLIGRAM(S): at 18:05

## 2018-12-07 RX ADMIN — HEPARIN SODIUM 5000 UNIT(S): 5000 INJECTION INTRAVENOUS; SUBCUTANEOUS at 14:05

## 2018-12-07 RX ADMIN — Medication 25 MILLIGRAM(S): at 05:50

## 2018-12-07 RX ADMIN — HEPARIN SODIUM 5000 UNIT(S): 5000 INJECTION INTRAVENOUS; SUBCUTANEOUS at 05:50

## 2018-12-07 RX ADMIN — LISINOPRIL 2.5 MILLIGRAM(S): 2.5 TABLET ORAL at 05:50

## 2018-12-07 RX ADMIN — CYCLOBENZAPRINE HYDROCHLORIDE 5 MILLIGRAM(S): 10 TABLET, FILM COATED ORAL at 18:05

## 2018-12-07 RX ADMIN — Medication 100 MILLIGRAM(S): at 05:50

## 2018-12-07 RX ADMIN — CLOPIDOGREL BISULFATE 75 MILLIGRAM(S): 75 TABLET, FILM COATED ORAL at 12:15

## 2018-12-07 RX ADMIN — Medication 100 MILLIGRAM(S): at 23:47

## 2018-12-07 RX ADMIN — CYCLOBENZAPRINE HYDROCHLORIDE 5 MILLIGRAM(S): 10 TABLET, FILM COATED ORAL at 05:50

## 2018-12-07 RX ADMIN — HEPARIN SODIUM 5000 UNIT(S): 5000 INJECTION INTRAVENOUS; SUBCUTANEOUS at 21:59

## 2018-12-07 RX ADMIN — CEFTRIAXONE 100 GRAM(S): 500 INJECTION, POWDER, FOR SOLUTION INTRAMUSCULAR; INTRAVENOUS at 22:00

## 2018-12-07 NOTE — PROGRESS NOTE ADULT - PROBLEM SELECTOR PLAN 3
Patient with acute onset of back spasms in the last week. Unlikely to be pyelonephritis given lack of CVA tenderness and patient's non toxic appearance. Pt w/o alarm s/s.  - no renal stone seen on CT abdomen/pelvis but study limited by previous contrast from CTA  - per pt's orthopedist, pt was seen for the first time on 12/3, no imaging was done  - seen by ortho here, ordered MRI L spine, read pending  - flexeril 5mg bid for spasms

## 2018-12-07 NOTE — DISCHARGE NOTE ADULT - SECONDARY DIAGNOSIS.
Acute low back pain without sciatica, unspecified back pain laterality Chronic congestive heart failure, unspecified heart failure type Malignant neoplasm of colon, unspecified part of colon

## 2018-12-07 NOTE — DISCHARGE NOTE ADULT - PLAN OF CARE
Resolution You came to the hospital reporting spasms in your back. Continue to take your home medication as prescribed. On imaging of your chest to look for clots, there showed lesions in your liver. You will need further imaging, CT abdomen with contrast or MRI abdomen to evaluate these lesions. Please follow up with your PCP. Please follow up with your orthopedist, Dr. Vyas, for further management. You came to the hospital reporting spasms in your back. Physical exam did not show signs concerning for nerve involvement. You were given flexeril to help with your spasms. You were seen by orthopedic surgery who ordered an MRI, which you were able to tolerate. MRI showed signs of increased fat in the space surrounding the spine (epidural lipomatosis). Please follow up with your orthopedist, Dr. Vyas, for further management. You came to the hospital reporting back spasms. You were found to have an increased heart rate, you were breathing rapidly, and your white blood cell count was elevated. A sample of your urine was positive for infection. You were treated with a 3 day course of antibiotics (ceftriaxone). Follow up with your Primary Care Physician and Cardiologist within 1 week of discharge. Follow up with your Primary Care Physician within 1 week of discharge. On imaging of your chest to look for clots, there were noted to be hypodense lesions in your liver. You will need further imaging, CT abdomen with contrast or MRI abdomen to evaluate these lesions. Please follow up with your PCP. Resolution. Follow up with your Primary Care Physician within 1 week of discharge. Please follow up with your orthopedist, Dr. Vyas, for further management within 1 week of discharge. You came to the hospital reporting spasms in your back. Physical exam did not show signs concerning for nerve involvement. You were given flexeril to help with your spasms. You were seen by orthopedic surgery who ordered an MRI, which you were able to tolerate. MRI showed signs of increased fat in the space surrounding the spine (epidural lipomatosis). Please follow up with your orthopedist, Dr. Vyas, within 1 weeks of discharge for further management and for renewal of your medication You came to the hospital reporting spasms in your back. Physical exam did not show signs concerning for nerve involvement. You were given flexeril to help with your spasms. You should not take this medication near the same time that you take you potassium supplementation.  You were seen by orthopedic surgery who ordered an MRI, which you were able to tolerate. MRI showed signs of increased fat in the space surrounding the spine (epidural lipomatosis). Please follow up with your orthopedist, Dr. Vyas, within 1 weeks of discharge for further management and for renewal of your medication

## 2018-12-07 NOTE — DISCHARGE NOTE ADULT - HOSPITAL COURSE
73M PMHx CAD s/p stent, CHF (LVEF 38%), Colon CA in remission was admitted for back pain/spasm and dyspnea, met sepsis criteria on admission likely from a urinary tract infection. PT was empirically covered with ceftriaxone. UCx (+) E.coli, pansensitive. Pt's back spasms improved. Pt unable to tolerate MRI in the past 2/2 pain. Seen by ortho, MRI with epidural lipomatosis in L5-S1. Pt ambulating well, without focal neurological deficits on physical exam.     Pt was also reporting dyspnea. Given IV lasix 40 mg once, and placed back on home dose oral lasix. Less concerned for CHF exacerbation given pro-BNP low, CTA without signs of fluid overload. Repeat Echo moderate global left ventricular systolic dysfunction.     Pt is currently stable. He is to follow up with outpatient orthopedist for further management of back pain. 73M PMHx CAD s/p stent, CHF (LVEF 38%), Colon CA in remission was admitted for back pain/spasm and dyspnea, met sepsis criteria on admission likely from a urinary tract infection. PT was empirically covered with ceftriaxone. UCx (+) E.coli, pansensitive. Pt's back spasms improved. Pt unable to tolerate MRI in the past 2/2 pain. Seen by ortho, MRI with epidural lipomatosis in L5-S1. Pt ambulating well, without focal neurological deficits on physical exam.     Pt was also reporting dyspnea. Given IV lasix 40 mg once, and placed back on home dose oral lasix. Less concerned for CHF exacerbation given pro-BNP low, CTA without signs of fluid overload. Repeat ECHO moderate global left ventricular systolic dysfunction.     Pt is currently stable. He is to follow up with outpatient orthopedist for further management of back pain. 73M PMHx CAD s/p stent, CHF (LVEF 38%), Colon CA in remission was admitted for back pain/spasm and dyspnea, met sepsis criteria on admission likely from a urinary tract infection. PT was empirically covered with ceftriaxone. UCx (+) E.coli, pansensitive. Pt's back spasms improved. Pt unable to tolerate MRI in the past 2/2 pain. Seen by ortho, MRI with epidural lipomatosis in L5-S1. Pt ambulating well, without focal neurological deficits on physical exam.  Given flexeril for spasms.  Will follow up outpatient with Ortho for treatment plan.    Pt was also reporting dyspnea. Given IV lasix 40 mg once, and placed back on home dose oral lasix. Less concerned for CHF exacerbation given pro-BNP low, CTA without signs of fluid overload. Repeat ECHO moderate global left ventricular systolic dysfunction.     Pt is currently stable. He is to follow up with outpatient orthopedist for further management of back pain. 73M PMHx CAD s/p stent, CHF (LVEF 38%), Colon CA in remission was admitted for back pain/spasm and dyspnea, met sepsis criteria on admission likely from a urinary tract infection. PT was empirically covered with ceftriaxone. UCx (+) E.coli, pansensitive. Pt's back spasms improved. Pt unable to tolerate MRI in the past 2/2 pain. Seen by ortho, MRI with epidural lipomatosis in L5-S1. Pt ambulating well, without focal neurological deficits on physical exam.  Given flexeril for spasms.  Will follow up outpatient with Ortho for treatment plan.    Pt was also reporting dyspnea. Given IV lasix 40 mg once, and placed back on home dose oral lasix. Less concerned for CHF exacerbation given pro-BNP low, CTA without signs of fluid overload. Repeat ECHO moderate global left ventricular systolic dysfunction.     Pt is currently stable. He is to follow up with outpatient orthopedist for further management of back pain. to follow up with PCP, cardiology and ortho outpatient, need abd MRI for f/u liver hypodensity with Hx of Colon Ca.

## 2018-12-07 NOTE — DISCHARGE NOTE ADULT - ADDITIONAL INSTRUCTIONS
-Please follow up with your orthopedist for further evaluation of your back pain.  -Please follow up with your PCP for further evaluation of your liver lesions. -Please follow up with your orthopedist, Dr. Vyas, for further management of your back pain.  -Please follow up with your PCP for further evaluation of your liver lesions.

## 2018-12-07 NOTE — DISCHARGE NOTE ADULT - CARE PROVIDER_API CALL
Gage Vyas), PhysicalRehab Medicine; Sports Medicine  [address] [address]  Phone: [phone]  Fax: [phone] Gage Vyas), PhysicalRehab Medicine; Sports Medicine  [address] [facility]  [address]  [place] [address]  Phone: [phone]  Fax: [phone]    Ki Long), Cardiovascular Disease; Internal Medicine; Interventional Cardiology  [address] [place] [address]   [place] [address]  Phone: [phone]  Fax: [phone]

## 2018-12-07 NOTE — DISCHARGE NOTE ADULT - OTHER SIGNIFICANT FINDINGS
< from: MR Lumbar Spine No Cont (12.07.18 @ 08:10) >  Impression: Degenerative changes as described above.    Epidural lipomatosis seen at the L5-S1 level.    Radiation changes suspected in the sacral and bilateral iliac region.   Please correlate clinically.    < end of copied text > < from: MR Lumbar Spine No Cont (12.07.18 @ 08:10) >  Impression: Degenerative changes as described above.    Epidural lipomatosis seen at the L5-S1 level.    Radiation changes suspected in the sacral and bilateral iliac region.   Please correlate clinically.    < end of copied text >    < from: CT Angio Chest w/ IV Cont (12.04.18 @ 22:45) >  IMPRESSION:   Limited opacification of the pulmonary arterial tree.No pulmonary   embolism to the level of the lobar pulmonary arteries.  Hypodense hepatic lesions, indeterminate given history of colon cancer.   Further evaluation with contrast-enhanced CT or MRI of the abdomen is   recommended.    < end of copied text >    < from: TTE with Doppler (w/Cont) (12.05.18 @ 15:39) >  CONCLUSIONS:  Technically very difficult study.  1. Mitral annular calcification, otherwise normal mitral  valve. Minimal mitral regurgitation.  2. Endocardium not well visualized; grossly moderate global  left ventricular systolic dysfunction.  Endocardial  visualization enhanced with intravenous injection of echo  contrast (Definity).  3. Unable to accurately evaluate right ventricular size or  systolic function.    < end of copied text >

## 2018-12-07 NOTE — DISCHARGE NOTE ADULT - PATIENT PORTAL LINK FT
You can access the [facility] Patient Portal, offered by [facility], by registering with the following website: [email or web address]

## 2018-12-07 NOTE — DISCHARGE NOTE ADULT - CONDITIONS AT DISCHARGE
Mr Haro is stable for discharge, alert oriented x 4; shortness of Breath has resolved ; skin is intact and no edema of the Lower Extremities are noted ; vital signs are stable and Instructions are given.

## 2018-12-07 NOTE — DISCHARGE NOTE ADULT - CARE PLAN
Principal Discharge DX:	Sepsis due to urinary tract infection  Secondary Diagnosis:	Acute low back pain without sciatica, unspecified back pain laterality  Secondary Diagnosis:	Chronic congestive heart failure, unspecified heart failure type  Secondary Diagnosis:	Malignant neoplasm of colon, unspecified part of colon Principal Discharge DX:	Sepsis due to urinary tract infection  Goal:	Resolution  Secondary Diagnosis:	Acute low back pain without sciatica, unspecified back pain laterality  Assessment and plan of treatment:	You came to the hospital reporting spasms in your back.  Secondary Diagnosis:	Chronic congestive heart failure, unspecified heart failure type  Assessment and plan of treatment:	Continue to take your home medication as prescribed.  Secondary Diagnosis:	Malignant neoplasm of colon, unspecified part of colon  Assessment and plan of treatment:	On imaging of your chest to look for clots, there showed lesions in your liver. You will need further imaging, CT abdomen with contrast or MRI abdomen to evaluate these lesions. Please follow up with your PCP. Principal Discharge DX:	Sepsis due to urinary tract infection  Goal:	Resolution  Secondary Diagnosis:	Acute low back pain without sciatica, unspecified back pain laterality  Goal:	Please follow up with your orthopedist, Dr. Vyas, for further management.  Assessment and plan of treatment:	You came to the hospital reporting spasms in your back. Physical exam did not show signs concerning for nerve involvement. You were given flexeril to help with your spasms. You were seen by orthopedic surgery who ordered an MRI, which you were able to tolerate. MRI showed signs of increased fat in the space surrounding the spine (epidural lipomatosis). Please follow up with your orthopedist, Dr. Vyas, for further management.  Secondary Diagnosis:	Chronic congestive heart failure, unspecified heart failure type  Assessment and plan of treatment:	Continue to take your home medication as prescribed.  Secondary Diagnosis:	Malignant neoplasm of colon, unspecified part of colon  Assessment and plan of treatment:	On imaging of your chest to look for clots, there showed lesions in your liver. You will need further imaging, CT abdomen with contrast or MRI abdomen to evaluate these lesions. Please follow up with your PCP. Principal Discharge DX:	Sepsis due to urinary tract infection  Goal:	Resolution  Assessment and plan of treatment:	You came to the hospital reporting back spasms. You were found to have an increased heart rate, you were breathing rapidly, and your white blood cell count was elevated. A sample of your urine was positive for infection. You were treated with a 3 day course of antibiotics (ceftriaxone).  Secondary Diagnosis:	Acute low back pain without sciatica, unspecified back pain laterality  Goal:	Please follow up with your orthopedist, Dr. Vyas, for further management.  Assessment and plan of treatment:	You came to the hospital reporting spasms in your back. Physical exam did not show signs concerning for nerve involvement. You were given flexeril to help with your spasms. You were seen by orthopedic surgery who ordered an MRI, which you were able to tolerate. MRI showed signs of increased fat in the space surrounding the spine (epidural lipomatosis). Please follow up with your orthopedist, Dr. Vyas, for further management.  Secondary Diagnosis:	Chronic congestive heart failure, unspecified heart failure type  Assessment and plan of treatment:	Continue to take your home medication as prescribed.  Secondary Diagnosis:	Malignant neoplasm of colon, unspecified part of colon  Assessment and plan of treatment:	On imaging of your chest to look for clots, there showed lesions in your liver. You will need further imaging, CT abdomen with contrast or MRI abdomen to evaluate these lesions. Please follow up with your PCP. Principal Discharge DX:	Sepsis due to urinary tract infection  Goal:	Resolution  Assessment and plan of treatment:	You came to the hospital reporting back spasms. You were found to have an increased heart rate, you were breathing rapidly, and your white blood cell count was elevated. A sample of your urine was positive for infection. You were treated with a 3 day course of antibiotics (ceftriaxone).  Secondary Diagnosis:	Acute low back pain without sciatica, unspecified back pain laterality  Goal:	Please follow up with your orthopedist, Dr. Vyas, for further management.  Assessment and plan of treatment:	You came to the hospital reporting spasms in your back. Physical exam did not show signs concerning for nerve involvement. You were given flexeril to help with your spasms. You were seen by orthopedic surgery who ordered an MRI, which you were able to tolerate. MRI showed signs of increased fat in the space surrounding the spine (epidural lipomatosis). Please follow up with your orthopedist, Dr. Vyas, for further management.  Secondary Diagnosis:	Chronic congestive heart failure, unspecified heart failure type  Goal:	Follow up with your Primary Care Physician and Cardiologist within 1 week of discharge.  Assessment and plan of treatment:	Continue to take your home medication as prescribed.  Secondary Diagnosis:	Malignant neoplasm of colon, unspecified part of colon  Goal:	Follow up with your Primary Care Physician within 1 week of discharge.  Assessment and plan of treatment:	On imaging of your chest to look for clots, there were noted to be hypodense lesions in your liver. You will need further imaging, CT abdomen with contrast or MRI abdomen to evaluate these lesions. Please follow up with your PCP. Principal Discharge DX:	Sepsis due to urinary tract infection  Goal:	Resolution. Follow up with your Primary Care Physician within 1 week of discharge.  Assessment and plan of treatment:	You came to the hospital reporting back spasms. You were found to have an increased heart rate, you were breathing rapidly, and your white blood cell count was elevated. A sample of your urine was positive for infection. You were treated with a 3 day course of antibiotics (ceftriaxone).  Secondary Diagnosis:	Acute low back pain without sciatica, unspecified back pain laterality  Goal:	Please follow up with your orthopedist, Dr. Vays, for further management within 1 week of discharge.  Assessment and plan of treatment:	You came to the hospital reporting spasms in your back. Physical exam did not show signs concerning for nerve involvement. You were given flexeril to help with your spasms. You were seen by orthopedic surgery who ordered an MRI, which you were able to tolerate. MRI showed signs of increased fat in the space surrounding the spine (epidural lipomatosis). Please follow up with your orthopedist, Dr. Vyas, within 1 weeks of discharge for further management and for renewal of your medication  Secondary Diagnosis:	Chronic congestive heart failure, unspecified heart failure type  Goal:	Follow up with your Primary Care Physician and Cardiologist within 1 week of discharge.  Assessment and plan of treatment:	Continue to take your home medication as prescribed.  Secondary Diagnosis:	Malignant neoplasm of colon, unspecified part of colon  Goal:	Follow up with your Primary Care Physician within 1 week of discharge.  Assessment and plan of treatment:	On imaging of your chest to look for clots, there were noted to be hypodense lesions in your liver. You will need further imaging, CT abdomen with contrast or MRI abdomen to evaluate these lesions. Please follow up with your PCP. Principal Discharge DX:	Sepsis due to urinary tract infection  Goal:	Resolution. Follow up with your Primary Care Physician within 1 week of discharge.  Assessment and plan of treatment:	You came to the hospital reporting back spasms. You were found to have an increased heart rate, you were breathing rapidly, and your white blood cell count was elevated. A sample of your urine was positive for infection. You were treated with a 3 day course of antibiotics (ceftriaxone).  Secondary Diagnosis:	Acute low back pain without sciatica, unspecified back pain laterality  Goal:	Please follow up with your orthopedist, Dr. Vyas, for further management within 1 week of discharge.  Assessment and plan of treatment:	You came to the hospital reporting spasms in your back. Physical exam did not show signs concerning for nerve involvement. You were given flexeril to help with your spasms. You should not take this medication near the same time that you take you potassium supplementation.  You were seen by orthopedic surgery who ordered an MRI, which you were able to tolerate. MRI showed signs of increased fat in the space surrounding the spine (epidural lipomatosis). Please follow up with your orthopedist, Dr. Vyas, within 1 weeks of discharge for further management and for renewal of your medication  Secondary Diagnosis:	Chronic congestive heart failure, unspecified heart failure type  Goal:	Follow up with your Primary Care Physician and Cardiologist within 1 week of discharge.  Assessment and plan of treatment:	Continue to take your home medication as prescribed.  Secondary Diagnosis:	Malignant neoplasm of colon, unspecified part of colon  Goal:	Follow up with your Primary Care Physician within 1 week of discharge.  Assessment and plan of treatment:	On imaging of your chest to look for clots, there were noted to be hypodense lesions in your liver. You will need further imaging, CT abdomen with contrast or MRI abdomen to evaluate these lesions. Please follow up with your PCP.

## 2018-12-07 NOTE — DISCHARGE NOTE ADULT - MEDICATION SUMMARY - MEDICATIONS TO TAKE
I will START or STAY ON the medications listed below when I get home from the hospital:    lisinopril 2.5 mg oral tablet  -- 1 tab(s) by mouth once a day  -- Indication: For Chronic systolic congestive heart failure    allopurinol 100 mg oral tablet  -- 1 tab(s) by mouth 2 times a day  -- Indication: For Gout    atorvastatin 20 mg oral tablet  -- 1 tab(s) by mouth once a day  -- Indication: For CAD (coronary artery disease)    Plavix 75 mg oral tablet  -- 1 tab(s) by mouth once a day  -- Indication: For CAD (coronary artery disease)    metoprolol succinate 25 mg oral tablet, extended release  -- 1 tab(s) by mouth once a day  -- Indication: For Chronic systolic congestive heart failure    furosemide 20 mg oral tablet  -- 1 tab(s) by mouth once a day  -- Indication: For Chronic systolic congestive heart failure    potassium chloride 20 mEq oral tablet, extended release  -- 1 tab(s) by mouth once a day  -- Indication: For Nutritional supplement    cyclobenzaprine 5 mg oral tablet  -- 1 tab(s) by mouth 2 times a day, As Needed for muscle spasms  -- Indication: For Muscle spasms I will START or STAY ON the medications listed below when I get home from the hospital:    lisinopril 2.5 mg oral tablet  -- 1 tab(s) by mouth once a day  -- Indication: For Chronic systolic congestive heart failure    allopurinol 100 mg oral tablet  -- 1 tab(s) by mouth 2 times a day  -- Indication: For Gout    atorvastatin 20 mg oral tablet  -- 1 tab(s) by mouth once a day  -- Indication: For CAD (coronary artery disease)    Plavix 75 mg oral tablet  -- 1 tab(s) by mouth once a day  -- Indication: For CAD (coronary artery disease)    metoprolol succinate 25 mg oral tablet, extended release  -- 1 tab(s) by mouth once a day  -- Indication: For Chronic systolic congestive heart failure    furosemide 20 mg oral tablet  -- 1 tab(s) by mouth once a day  -- Indication: For Chronic systolic congestive heart failure    potassium chloride 20 mEq oral powder for reconstitution  -- 1 each by mouth once a day   -- Dilute this medication with liquid before administration.  It is very important that you take or use this exactly as directed.  Do not skip doses or discontinue unless directed by your doctor.  Medication should be taken with plenty of water.  Take with food or milk.    -- Indication: For Need for prophylactic measure    cyclobenzaprine 5 mg oral tablet  -- 1 tab(s) by mouth 2 times a day, As Needed for muscle spasms  -- Indication: For Muscle spasms    cyclobenzaprine 5 mg oral tablet  -- 1 tab(s) by mouth 2 times a day, As Needed for muscle spasms  -- Indication: For Acute low back pain without sciatica, unspecified back pain laterality

## 2018-12-08 VITALS
HEART RATE: 93 BPM | SYSTOLIC BLOOD PRESSURE: 116 MMHG | RESPIRATION RATE: 18 BRPM | TEMPERATURE: 100 F | OXYGEN SATURATION: 96 % | DIASTOLIC BLOOD PRESSURE: 79 MMHG

## 2018-12-08 LAB
BASOPHILS # BLD AUTO: 0.03 K/UL — SIGNIFICANT CHANGE UP (ref 0–0.2)
BASOPHILS NFR BLD AUTO: 0.2 % — SIGNIFICANT CHANGE UP (ref 0–2)
EOSINOPHIL # BLD AUTO: 0.07 K/UL — SIGNIFICANT CHANGE UP (ref 0–0.5)
EOSINOPHIL NFR BLD AUTO: 0.5 % — SIGNIFICANT CHANGE UP (ref 0–6)
HCT VFR BLD CALC: 42.9 % — SIGNIFICANT CHANGE UP (ref 39–50)
HGB BLD-MCNC: 13.9 G/DL — SIGNIFICANT CHANGE UP (ref 13–17)
IMM GRANULOCYTES # BLD AUTO: 0.1 # — SIGNIFICANT CHANGE UP
IMM GRANULOCYTES NFR BLD AUTO: 0.7 % — SIGNIFICANT CHANGE UP (ref 0–1.5)
LYMPHOCYTES # BLD AUTO: 0.66 K/UL — LOW (ref 1–3.3)
LYMPHOCYTES # BLD AUTO: 4.5 % — LOW (ref 13–44)
MCHC RBC-ENTMCNC: 29 PG — SIGNIFICANT CHANGE UP (ref 27–34)
MCHC RBC-ENTMCNC: 32.4 % — SIGNIFICANT CHANGE UP (ref 32–36)
MCV RBC AUTO: 89.4 FL — SIGNIFICANT CHANGE UP (ref 80–100)
MONOCYTES # BLD AUTO: 1.32 K/UL — HIGH (ref 0–0.9)
MONOCYTES NFR BLD AUTO: 9.1 % — SIGNIFICANT CHANGE UP (ref 2–14)
NEUTROPHILS # BLD AUTO: 12.34 K/UL — HIGH (ref 1.8–7.4)
NEUTROPHILS NFR BLD AUTO: 85 % — HIGH (ref 43–77)
NRBC # FLD: 0 — SIGNIFICANT CHANGE UP
PLATELET # BLD AUTO: 317 K/UL — SIGNIFICANT CHANGE UP (ref 150–400)
PMV BLD: 9.4 FL — SIGNIFICANT CHANGE UP (ref 7–13)
RBC # BLD: 4.8 M/UL — SIGNIFICANT CHANGE UP (ref 4.2–5.8)
RBC # FLD: 13.4 % — SIGNIFICANT CHANGE UP (ref 10.3–14.5)
WBC # BLD: 14.52 K/UL — HIGH (ref 3.8–10.5)
WBC # FLD AUTO: 14.52 K/UL — HIGH (ref 3.8–10.5)

## 2018-12-08 PROCEDURE — 99239 HOSP IP/OBS DSCHRG MGMT >30: CPT

## 2018-12-08 RX ORDER — CYCLOBENZAPRINE HYDROCHLORIDE 10 MG/1
1 TABLET, FILM COATED ORAL
Qty: 28 | Refills: 0
Start: 2018-12-08 | End: 2018-12-21

## 2018-12-08 RX ORDER — POTASSIUM CHLORIDE 20 MEQ
1 PACKET (EA) ORAL
Qty: 1 | Refills: 0
Start: 2018-12-08 | End: 2019-01-06

## 2018-12-08 RX ORDER — POTASSIUM CHLORIDE 20 MEQ
1 PACKET (EA) ORAL
Qty: 0 | Refills: 0 | COMMUNITY

## 2018-12-08 RX ADMIN — Medication 100 MILLIGRAM(S): at 05:30

## 2018-12-08 RX ADMIN — Medication 20 MILLIGRAM(S): at 06:33

## 2018-12-08 RX ADMIN — CLOPIDOGREL BISULFATE 75 MILLIGRAM(S): 75 TABLET, FILM COATED ORAL at 11:27

## 2018-12-08 RX ADMIN — LISINOPRIL 2.5 MILLIGRAM(S): 2.5 TABLET ORAL at 06:33

## 2018-12-08 RX ADMIN — CYCLOBENZAPRINE HYDROCHLORIDE 5 MILLIGRAM(S): 10 TABLET, FILM COATED ORAL at 06:32

## 2018-12-08 RX ADMIN — HEPARIN SODIUM 5000 UNIT(S): 5000 INJECTION INTRAVENOUS; SUBCUTANEOUS at 06:33

## 2018-12-08 RX ADMIN — Medication 100 MILLIGRAM(S): at 06:33

## 2018-12-08 RX ADMIN — Medication 25 MILLIGRAM(S): at 06:33

## 2018-12-08 NOTE — PROGRESS NOTE ADULT - PROBLEM SELECTOR PLAN 1
Met sepsis criteria with tachycardia, tachypnea, leukocytosis and likely source of urine given grossly positive UA.  - s/p 1L of fluid, ceftriaxone x 1 and azithromycin in the ED  - UCx (+) E. coli; pt on ceftriaxone, can d/c with PO bactrim x1day.
Met sepsis criteria with tachycardia, tachypnea, leukocytosis and likely source of urine given grossly positive UA.  - s/p 1L of fluid, ceftriaxone x 1 and azithromycin in the ED  - UCx (+) E. coli; pt received 3 days ceftriaxone
Met sepsis criteria with tachycardia, tachypnea, leukocytosis and likely source of urine given grossly positive UA.  - s/p 1L of fluid, ceftriaxone x 1 and azithromycin in the ED, suspicion for PNA is low, will continue with ceftriaxone daily and narrow coverage once sensitivities from culture return  - f/u UCx
Met sepsis criteria with tachycardia, tachypnea, leukocytosis and likely source of urine given grossly positive UA.  - s/p 1L of fluid, ceftriaxone x 1 and azithromycin in the ED, suspicion for PNA is low, will continue with ceftriaxone daily and narrow coverage once sensitivities from culture return  - f/u UCx

## 2018-12-08 NOTE — PROGRESS NOTE ADULT - PROBLEM SELECTOR PROBLEM 3
Acute low back pain without sciatica, unspecified back pain laterality

## 2018-12-08 NOTE — PROGRESS NOTE ADULT - SUBJECTIVE AND OBJECTIVE BOX
[facility] Pager [phone]      Patient is a 73y old  Male who presents with a chief complaint of UTI, Back pain (05 Dec 2018 04:07)      INTERVAL HPI/OVERNIGHT EVENTS: No acute events overnight. Pt resting comfortably in bed. Denies pain currently. Denies abdominal pain, N/V, or dysuria. Denies numbness, tingling, or weakness in lower extremities.      T(C): 36.8 (18 @ 05:41), Max: 36.8 (18 @ 02:33)  HR: 97 (18 @ 05:41) (97 - 109)  BP: 126/69 (18 @ 05:41) (126/69 - 146/80)  RR: 18 (18 @ 05:41) (15 - 22)  SpO2: 98% (18 @ 05:41) (96% - 99%)  Wt(kg): --Vital Signs Last 24 Hrs  T(C): 36.8 (05 Dec 2018 05:41), Max: 36.8 (05 Dec 2018 02:33)  T(F): 98.2 (05 Dec 2018 05:41), Max: 98.2 (05 Dec 2018 02:33)  HR: 97 (05 Dec 2018 05:41) (97 - 109)  BP: 126/69 (05 Dec 2018 05:41) (126/69 - 146/80)  BP(mean): --  RR: 18 (05 Dec 2018 05:41) (15 - 22)  SpO2: 98% (05 Dec 2018 05:41) (96% - 99%)    PHYSICAL EXAM:  GENERAL: NAD  HEAD:  Atraumatic, Normocephalic  EYES: EOMI, conjunctiva and sclera clear  ENMT: Moist mucous membranes  NECK: Supple  NERVOUS SYSTEM:  Alert & Oriented X3, no FND  CHEST/LUNG: Clear to auscultation bilaterally; No rales, rhonchi, wheezing, or rubs  HEART: Regular rate and rhythm; No murmurs, rubs, or gallops  ABDOMEN: Soft, Nontender, Nondistended; Bowel sounds present; no CVA tenderness  EXTREMITIES:  1+ bilateral edema, 2+ Peripheral Pulses, No clubbing or cyanosis  SKIN: No rashes or lesions    Consultant(s) Notes Reviewed:  [x ] YES  [ ] NO  Care Discussed with Consultants/Other Providers [ x] YES  [ ] NO    LABS:                        13.9   12.98 )-----------( 239      ( 05 Dec 2018 06:00 )             41.1     12-    137  |  100  |  12  ----------------------------<  128<H>  3.9   |  22  |  0.84    Ca    9.1      05 Dec 2018 06:00  Phos  2.3       Mg     2.0         TPro  7.6  /  Alb  4.0  /  TBili  1.1  /  DBili  x   /  AST  18  /  ALT  31  /  AlkPhos  83  12-04    PT/INR - ( 04 Dec 2018 19:30 )   PT: 13.5 SEC;   INR: 1.21          PTT - ( 04 Dec 2018 19:30 )  PTT:31.2 SEC  Urinalysis Basic - ( 04 Dec 2018 21:13 )    Color: YELLOW / Appearance: Lt TURBID / S.029 / pH: 6.0  Gluc: NEGATIVE / Ketone: SMALL  / Bili: NEGATIVE / Urobili: NORMAL   Blood: SMALL / Protein: 70 / Nitrite: POSITIVE   Leuk Esterase: LARGE / RBC: 3-5 / WBC >50   Sq Epi: OCC / Non Sq Epi: x / Bacteria: MANY      CAPILLARY BLOOD GLUCOSE            Urinalysis Basic - ( 04 Dec 2018 21:13 )    Color: YELLOW / Appearance: Lt TURBID / S.029 / pH: 6.0  Gluc: NEGATIVE / Ketone: SMALL  / Bili: NEGATIVE / Urobili: NORMAL   Blood: SMALL / Protein: 70 / Nitrite: POSITIVE   Leuk Esterase: LARGE / RBC: 3-5 / WBC >50   Sq Epi: OCC / Non Sq Epi: x / Bacteria: MANY        RADIOLOGY & ADDITIONAL TESTS:    Imaging Personally Reviewed:  [X ] YES  [ ] NO
Dr. Ani Torreeete PGY1 Pager [phone]      Patient is a 73y old  Male who presents with a chief complaint of UTI, Back pain (06 Dec 2018 08:21)      INTERVAL HPI/OVERNIGHT EVENTS: No acute events overnight. Pt without complaints of spasms. Denies abdominal pain, N/V.      T(C): 36.5 (12-07-18 @ 05:46), Max: 37.1 (12-06-18 @ 15:01)  HR: 99 (12-07-18 @ 05:46) (98 - 102)  BP: 121/71 (12-07-18 @ 05:46) (121/71 - 153/90)  RR: 18 (12-07-18 @ 05:46) (18 - 20)  SpO2: 95% (12-07-18 @ 05:46) (95% - 97%)  Wt(kg): --Vital Signs Last 24 Hrs  T(C): 36.5 (07 Dec 2018 05:46), Max: 37.1 (06 Dec 2018 15:01)  T(F): 97.7 (07 Dec 2018 05:46), Max: 98.8 (06 Dec 2018 15:01)  HR: 99 (07 Dec 2018 05:46) (98 - 102)  BP: 121/71 (07 Dec 2018 05:46) (121/71 - 153/90)  BP(mean): --  RR: 18 (07 Dec 2018 05:46) (18 - 20)  SpO2: 95% (07 Dec 2018 05:46) (95% - 97%)    PHYSICAL EXAM:  GENERAL: NAD  HEAD:  Atraumatic, Normocephalic  EYES: EOMI, conjunctiva and sclera clear  ENMT: Moist mucous membranes  NECK: Supple  NERVOUS SYSTEM:  Alert & Oriented X3, no FND  CHEST/LUNG: Clear to auscultation bilaterally; No rales, rhonchi, wheezing, or rubs  HEART: Regular rate and rhythm; No murmurs, rubs, or gallops  ABDOMEN: Soft, Nontender, Nondistended; Bowel sounds present; no CVA tenderness  EXTREMITIES:  1+ bilateral edema, 2+ Peripheral Pulses, No clubbing or cyanosis  SKIN: No rashes or lesions    Consultant(s) Notes Reviewed:  [x ] YES  [ ] NO  Care Discussed with Consultants/Other Providers [ x] YES  [ ] NO    LABS:                        14.2   13.77 )-----------( 280      ( 07 Dec 2018 06:00 )             43.6     12-07    137  |  97<L>  |  13  ----------------------------<  123<H>  3.8   |  23  |  0.77    Ca    9.3      07 Dec 2018 06:00  Phos  3.0     12-07  Mg     2.4     12-07          CAPILLARY BLOOD GLUCOSE                RADIOLOGY & ADDITIONAL TESTS:    Imaging Personally Reviewed:  [X ] YES  [ ] NO
Gage Madden MD  Cell: [phone]  Pager: [phone]    S:  No overnight events.  Patient resting comfortably and says he has not had back spasms in several days.    VITAL SIGNS:  Vital Signs Last 24 Hrs  T(C): 37.5 (08 Dec 2018 06:30), Max: 37.5 (08 Dec 2018 06:30)  T(F): 99.5 (08 Dec 2018 06:30), Max: 99.5 (08 Dec 2018 06:30)  HR: 93 (08 Dec 2018 06:30) (93 - 98)  BP: 116/79 (08 Dec 2018 06:30) (116/79 - 129/56)  BP(mean): --  RR: 18 (08 Dec 2018 06:30) (18 - 18)  SpO2: 96% (08 Dec 2018 06:30) (96% - 98%)      PHYSICAL EXAM:   GENERAL: NAD  HEAD:  Atraumatic, Normocephalic  EYES: EOMI, conjunctiva and sclera clear  ENMT: Moist mucous membranes  NECK: Supple  NERVOUS SYSTEM:  Alert & Oriented X3, no FND  CHEST/LUNG: Clear to auscultation bilaterally; No rales, rhonchi, wheezing, or rubs  HEART: Regular rate and rhythm; No murmurs, rubs, or gallops  ABDOMEN: Soft, Nontender, Nondistended; Bowel sounds present; no CVA tenderness  EXTREMITIES:  1+ bilateral edema, 2+ Peripheral Pulses, No clubbing or cyanosis  SKIN: No rashes or lesions                          13.9   14.52 )-----------( 317      ( 08 Dec 2018 05:15 )             42.9     12-07    137  |  97<L>  |  13  ----------------------------<  123<H>  3.8   |  23  |  0.77    Ca    9.3      07 Dec 2018 06:00  Phos  3.0     12-07  Mg     2.4     12-07          MEDICATIONS  (STANDING):  allopurinol 100 milliGRAM(s) Oral two times a day  atorvastatin 20 milliGRAM(s) Oral at bedtime  cefTRIAXone   IVPB 1 Gram(s) IV Intermittent every 24 hours  clopidogrel Tablet 75 milliGRAM(s) Oral daily  cyclobenzaprine 5 milliGRAM(s) Oral two times a day  furosemide    Tablet 20 milliGRAM(s) Oral daily  heparin  Injectable 5000 Unit(s) SubCutaneous every 8 hours  lisinopril 2.5 milliGRAM(s) Oral daily  metoprolol succinate ER 25 milliGRAM(s) Oral daily
Patient seen and examined at bedside.    Overnight Events: KAYLI     Review Of Systems: No chest pain, shortness of breath, or palpitations            Medications:  allopurinol 100 milliGRAM(s) Oral two times a day  atorvastatin 20 milliGRAM(s) Oral at bedtime  cefTRIAXone   IVPB 1 Gram(s) IV Intermittent every 24 hours  clopidogrel Tablet 75 milliGRAM(s) Oral daily  cyclobenzaprine 5 milliGRAM(s) Oral two times a day  furosemide    Tablet 20 milliGRAM(s) Oral daily  heparin  Injectable 5000 Unit(s) SubCutaneous every 8 hours  lisinopril 2.5 milliGRAM(s) Oral daily  metoprolol succinate ER 25 milliGRAM(s) Oral daily      PAST MEDICAL & SURGICAL HISTORY:  Gout  HLD (hyperlipidemia)  Colon carcinoma: Sp Ileostomy and reverasl with chemo/rt (2012)  CAD (coronary artery disease): SP ptca w stent 5/2010  HTN (Hypertension)  S/P colon resection      Vitals:  T(F): 98.2 (12-07), Max: 98.2 (12-07)  HR: 98 (12-07) (98 - 102)  BP: 127/75 (12-07) (121/71 - 127/75)  RR: 18 (12-07)  SpO2: 96% (12-07)  I&O's Summary    06 Dec 2018 07:01  -  07 Dec 2018 07:00  --------------------------------------------------------  IN: 0 mL / OUT: 850 mL / NET: -850 mL        Physical Exam:  Appearance: No acute distress; well appearing  Eyes: PERRL, EOMI, pink conjunctiva  HENT: Normal oral muscosa  Cardiovascular: RRR, S1, S2, no murmurs, rubs, or gallops; no edema; no JVD  Respiratory: Clear to auscultation , decreased breath sounds at L base   Gastrointestinal: soft, non-tender, non-distended with normal bowel sounds  Musculoskeletal: No clubbing; no joint deformity   Neurologic: Non-focal  Lymphatic: No lymphadenopathy  Psychiatry: AAOx3, mood & affect appropriate  Skin: No rashes, ecchymoses, or cyanosis                          14.2   13.77 )-----------( 280      ( 07 Dec 2018 06:00 )             43.6     12-07    137  |  97<L>  |  13  ----------------------------<  123<H>  3.8   |  23  |  0.77    Ca    9.3      07 Dec 2018 06:00  Phos  3.0     12-07  Mg     2.4     12-07        CARDIAC MARKERS ( 03 Dec 2018 23:47 )  9 ng/L / x     / x     / x     / x     / x      CARDIAC MARKERS ( 03 Dec 2018 17:38 )  7 ng/L / x     / x     / x     / x     / x          Serum Pro-Brain Natriuretic Peptide: 188.1 pg/mL (12-04 @ 19:30)
Dr. [facility] City[facility] PGY1 Pager [phone]      Patient is a 73y old  Male who presents with a chief complaint of UTI, Back pain (05 Dec 2018 18:50)      INTERVAL HPI/OVERNIGHT EVENTS: No acute events overnight. Pt without complaints of spasms. Denies abdominal pain, N/V.      T(C): 36.7 (18 @ 06:00), Max: 37.1 (18 @ 15:50)  HR: 96 (18 @ 06:00) (93 - 99)  BP: 134/78 (18 @ 06:00) (116/68 - 146/86)  RR: 18 (18 @ 06:00) (18 - 21)  SpO2: 96% (18 @ 06:00) (96% - 97%)  Wt(kg): --Vital Signs Last 24 Hrs  T(C): 36.7 (06 Dec 2018 06:00), Max: 37.1 (05 Dec 2018 15:50)  T(F): 98.1 (06 Dec 2018 06:00), Max: 98.8 (05 Dec 2018 15:50)  HR: 96 (06 Dec 2018 06:00) (93 - 99)  BP: 134/78 (06 Dec 2018 06:00) (116/68 - 146/86)  BP(mean): --  RR: 18 (06 Dec 2018 06:00) (18 - 21)  SpO2: 96% (06 Dec 2018 06:00) (96% - 97%)    PHYSICAL EXAM:  GENERAL: NAD  HEAD:  Atraumatic, Normocephalic  EYES: EOMI, conjunctiva and sclera clear  ENMT: Moist mucous membranes  NECK: Supple  NERVOUS SYSTEM:  Alert & Oriented X3, no FND  CHEST/LUNG: Clear to auscultation bilaterally; No rales, rhonchi, wheezing, or rubs  HEART: Regular rate and rhythm; No murmurs, rubs, or gallops  ABDOMEN: Soft, Nontender, Nondistended; Bowel sounds present; no CVA tenderness  EXTREMITIES:  1+ bilateral edema, 2+ Peripheral Pulses, No clubbing or cyanosis  SKIN: No rashes or lesions    Consultant(s) Notes Reviewed:  [x ] YES  [ ] NO  Care Discussed with Consultants/Other Providers [ x] YES  [ ] NO    LABS:                        13.9   12.61 )-----------( 251      ( 06 Dec 2018 06:02 )             42.2     12    133<L>  |  95<L>  |  12  ----------------------------<  115<H>  3.4<L>   |  22  |  0.73    Ca    8.9      06 Dec 2018 06:02  Phos  2.8     12-  Mg     2.0     12    TPro  7.6  /  Alb  4.0  /  TBili  1.1  /  DBili  x   /  AST  18  /  ALT  31  /  AlkPhos  83  12-04    PT/INR - ( 04 Dec 2018 19:30 )   PT: 13.5 SEC;   INR: 1.21          PTT - ( 04 Dec 2018 19:30 )  PTT:31.2 SEC  Urinalysis Basic - ( 04 Dec 2018 21:13 )    Color: YELLOW / Appearance: Lt TURBID / S.029 / pH: 6.0  Gluc: NEGATIVE / Ketone: SMALL  / Bili: NEGATIVE / Urobili: NORMAL   Blood: SMALL / Protein: 70 / Nitrite: POSITIVE   Leuk Esterase: LARGE / RBC: 3-5 / WBC >50   Sq Epi: OCC / Non Sq Epi: x / Bacteria: MANY      CAPILLARY BLOOD GLUCOSE            Urinalysis Basic - ( 04 Dec 2018 21:13 )    Color: YELLOW / Appearance: Lt TURBID / S.029 / pH: 6.0  Gluc: NEGATIVE / Ketone: SMALL  / Bili: NEGATIVE / Urobili: NORMAL   Blood: SMALL / Protein: 70 / Nitrite: POSITIVE   Leuk Esterase: LARGE / RBC: 3-5 / WBC >50   Sq Epi: OCC / Non Sq Epi: x / Bacteria: MANY        RADIOLOGY & ADDITIONAL TESTS:    Imaging Personally Reviewed:  [X ] YES  [ ] NO

## 2018-12-08 NOTE — PROGRESS NOTE ADULT - PROBLEM SELECTOR PROBLEM 1
Sepsis due to urinary tract infection

## 2018-12-08 NOTE — PROGRESS NOTE ADULT - ATTENDING COMMENTS
Pt was seen and examed at bedside with resident.  72 yo M PMH CAD s/p stent, CHF (LVEF 38%), Colon CA in remission was admitted for back pain/spasm and dyspnea, found sepsis on admission likely from a urinary tract infection. PT was empirically covered with ceftriaxone, Ux E coli, finishing 3 days course. Trending WBC. Back spasm improving, Pt unable to tolerate MRI in the past. Appreciate ortho consult, MRI degenerative change and Epidural lipomatosis, Symptomatic support, spasm revolved on Flexeril. Pt ambulating well, no focal neurological deficits on physical exam.   - Dyspnea: s/p IV lasix 40 mg once, continue home dose oral lasix. Cardiology appreciate, less likely CHF exacerbation given pro-BNP low, CTA no signs of fluid overload. Repeat Echo moderate global left ventricular systolic dysfunction.   Explained above findings and plan of care with Pt and wife at bedside. D/c planning home in AM, wife will come to .
Pt was seen and examed at bedside with resident.  74 yo M PMH CAD s/p stent, CHF (LVEF 38%), Colon CA in remission was admitted for back pain/spasm and dyspnea, found sepsis on admission likely from a urinary tract infection. PT was empirically covered with ceftriaxone, Ux E coli, f/u sensitivity. Trending WBC. Back spasm improving, Pt unable to tolerate MRI in the past. Appreciate ortho consult, MRI pending, Pt agreed to try this time. Symptomatic support, on Flexeril. Pt ambulating well, no focal neurological deficits on physical exam.   - Dyspnea: s/p IV lasix 40 mg once, continue home dose oral lasix. Cardiology appreciate, less likely CHF exacerbation given pro-BNP low, CTA no signs of fluid overload. Repeat Echo moderate global left ventricular systolic dysfunction.   Explained above findings and plan of care with Pt and wife at bedside.
Pt was seen and examed at bedside with resident.  74 yo M PMH CAD s/p stent, CHF (LVEF 38%), Colon CA in remission was admitted for back pain/spasm and dyspnea, found sepsis on admission likely from a urinary tract infection. PT was empirically covered with ceftriaxone, Ux E coli, finishing 3 days course. Trending WBC. Back spasm improving. Appreciate ortho consult, MRI degenerative change and Epidural lipomatosis, Symptomatic support, spasm revolved on Flexeril. Pt ambulating well, no focal neurological deficits on physical exam.   - Dyspnea: s/p IV lasix 40 mg once, continue home dose oral lasix. Cardiology appreciate, less likely CHF exacerbation given pro-BNP low, CTA no signs of fluid overload. Repeat Echo moderate global left ventricular systolic dysfunction.   Explained above findings and plan of care with Pt and wife at bedside. D/c home today, f/u with PCP, orthopedics and cardiology out patient. Discharge time 40 min.
Pt was seen and examed at bedside with resident.  72 yo M PMH CAD s/p stent, CHF (LVEF 38%), Colon CA in remission was admitted for back pain/spasm and dyspnea, found sepsis on admission likely from a urinary tract infection. PT was empirically covered with ceftriaxone, f/u Ux, trending WBC. Back spasm, Pt unable to tolerate MRI in the past, recent ortho follow up, need to obtain inform from Ortho. Symptomatic support, trial of Flexeril. Pt ambulating well, no focal neurological deficits on physical exam.   - Dyspnea: s/p IV lasix 40 mg once, continue home dose oral lasix. Cardiology appreciate, less likely CHF exacerbation given pro-BNP low. Repeat Echo ordered.

## 2018-12-08 NOTE — PROGRESS NOTE ADULT - PROBLEM SELECTOR PLAN 6
- DVT PPx: heparin subq  - Diet: DASH  - Dispo: pending resolution of symptoms
- DVT PPx: heparin subq  - Diet: DASH  - Dispo: home
- DVT PPx: heparin subq  - Diet: DASH  - Dispo: home
- DVT PPx: heparin subq  - Diet: DASH  - Dispo: pending resolution of symptoms

## 2018-12-08 NOTE — PROGRESS NOTE ADULT - PROBLEM SELECTOR PLAN 5
- s/p resection, radiation, and Xeloda   - CTA and CT A/P w/ hypodense lesions in liver, indeterminate; will further imaging CT w/ contrast or MRI for better visualization
- s/p resection, radiation, and Xeloda   - CTA and CT A/P w/ hypodense lesions in liver, indeterminate; will further imaging CT w/ contrast or MRI for better visualization
- s/p resection, radiation, and Xeloda   - CTA and CT A/P w/ hypodense lesions in liver, indeterminate; will need further imaging CT w/ contrast or MRI for better visualization
- s/p resection, radiation, and Xeloda   - CTA and CT A/P w/ hypodense lesions in liver, indeterminate; will need further imaging CT w/ contrast or MRI for better visualization

## 2018-12-08 NOTE — PROGRESS NOTE ADULT - PROBLEM SELECTOR PLAN 3
Patient with acute onset of back spasms in the last week. Unlikely to be pyelonephritis given lack of CVA tenderness and patient's non toxic appearance. Pt w/o alarm s/s.  - no renal stone seen on CT abdomen/pelvis but study limited by previous contrast from CTA  - per pt's orthopedist, pt was seen for the first time on 12/3, no imaging was done  - seen by ortho here, ordered MRI L spine which is most significant for Epidural lipomatosis seen at the L5-S1 level  - flexeril 5mg bid for spasms

## 2018-12-08 NOTE — PROGRESS NOTE ADULT - NSHPATTENDINGPLANDISCUSS_GEN_ALL_CORE
Pt, wife, RN, SW/CM, resident
Pt, wife, RN, SW/CM, resident.
Pt, wife, RN, resident
Pt, RN, resident.

## 2018-12-08 NOTE — PROGRESS NOTE ADULT - PROBLEM SELECTOR PLAN 2
Patient with history of CHF, per outpatient records last nuclear stress test was done in Jan 2018 with EF of 38%.  - patient does not appear to be in an acute CHF exacerbation given low proBNP, lack of pulmonary edema on chest xray, and relatively benign physical exam (small L pleural effusion, mild peripheral edema, good O2 sats)  - dyspnea and orthopnea however could be secondary to worsening LV function so TTE ordered   - TTE technically difficult study  - continue home dose of lasix 20mg daily
Patient with history of CHF, per outpatient records last nuclear stress test was done in Jan 2018 with EF of 38%.  - patient does not appear to be in an acute CHF exacerbation given low proBNP, lack of pulmonary edema on chest xray, and relatively benign physical exam (small L pleural effusion, mild peripheral edema, good O2 sats)  - dyspnea and orthopnea however could be secondary to worsening LV function so TTE ordered   - TTE technically difficult study  - continue home dose of lasix 20mg daily
Patient with history of CHF, per outpatient records last nuclear stress test was done in Jan 2018 with EF of 38%.  - patient does not appear to be in an acute CHF exacerbation given low proBNP, lack of pulmonary edema on chest xray, and relatively benign physical exam (small L pleural effusion, mild peripheral edema, good O2 sats)  - dyspnea and orthopnea however could be secondary to worsening LV function so TTE ordered   - continue home dose of lasix 20mg daily  - monitor BMP, replete electrolytes as needed
Patient with history of CHF, per outpatient records last nuclear stress test was done in Jan 2018 with EF of 38%.  - patient does not appear to be in an acute CHF exacerbation given low proBNP, lack of pulmonary edema on chest xray, and relatively benign physical exam (small L pleural effusion, mild peripheral edema, good O2 sats)  - dyspnea and orthopnea however could be secondary to worsening LV function so TTE ordered   - TTE technically difficult study  - continue home dose of lasix 20mg daily  - monitor BMP, replete electrolytes as needed

## 2018-12-08 NOTE — PROGRESS NOTE ADULT - PROBLEM SELECTOR PLAN 4
Hx of stent in 2010  - continue plavix, atorvastatin, metoprolol   - can resume home lisinopril
Hx of stent in 2010  - continue plavix, atorvastatin, metoprolol   - holding lisinopril given patient had two loads of contrast within 24 hours and is at high risk for developing ELA
Hx of stent in 2010  - continue plavix, atorvastatin, metoprolol, and lisinopril
Hx of stent in 2010  - continue plavix, atorvastatin, metoprolol, and lisinopril

## 2018-12-12 ENCOUNTER — APPOINTMENT (OUTPATIENT)
Dept: CARDIOLOGY | Facility: CLINIC | Age: 73
End: 2018-12-12
Payer: MEDICARE

## 2018-12-12 ENCOUNTER — NON-APPOINTMENT (OUTPATIENT)
Age: 73
End: 2018-12-12

## 2018-12-12 VITALS
HEART RATE: 94 BPM | HEIGHT: 73 IN | OXYGEN SATURATION: 94 % | BODY MASS INDEX: 33.13 KG/M2 | SYSTOLIC BLOOD PRESSURE: 111 MMHG | DIASTOLIC BLOOD PRESSURE: 77 MMHG | WEIGHT: 250 LBS

## 2018-12-12 PROCEDURE — 99214 OFFICE O/P EST MOD 30 MIN: CPT

## 2018-12-12 PROCEDURE — 93000 ELECTROCARDIOGRAM COMPLETE: CPT

## 2018-12-12 NOTE — PHYSICAL EXAM
[General Appearance - Well Developed] : well developed [Normal Appearance] : normal appearance [General Appearance - In No Acute Distress] : no acute distress [Normal Conjunctiva] : the conjunctiva exhibited no abnormalities [Eyelids - No Xanthelasma] : the eyelids demonstrated no xanthelasmas [Normal Oral Mucosa] : normal oral mucosa [No Oral Pallor] : no oral pallor [No Oral Cyanosis] : no oral cyanosis [Respiration, Rhythm And Depth] : normal respiratory rhythm and effort [Exaggerated Use Of Accessory Muscles For Inspiration] : no accessory muscle use [Heart Rate And Rhythm] : heart rate and rhythm were normal [Heart Sounds] : normal S1 and S2 [Murmurs] : no murmurs present [Edema] : no peripheral edema present [Abdomen Soft] : soft [Abdomen Tenderness] : non-tender [Abnormal Walk] : normal gait [Nail Clubbing] : no clubbing of the fingernails [Cyanosis, Localized] : no localized cyanosis [Petechial Hemorrhages (___cm)] : no petechial hemorrhages [] : no ischemic changes [Skin Color & Pigmentation] : normal skin color and pigmentation [Oriented To Time, Place, And Person] : oriented to person, place, and time [FreeTextEntry1] : bronchial breath sounds noted in left lower lung

## 2018-12-12 NOTE — REVIEW OF SYSTEMS
[Chest Pain] : chest pain [see HPI] : see HPI [Negative] : Heme/Lymph [Shortness Of Breath] : shortness of breath [Dyspnea on exertion] : dyspnea during exertion [Chest  Pressure] : no chest pressure [Lower Ext Edema] : no extremity edema [Leg Claudication] : no intermittent leg claudication [Palpitations] : no palpitations

## 2018-12-12 NOTE — DISCUSSION/SUMMARY
[Coronary Artery Disease] : coronary artery disease [Chronic Systolic Heart Failure] : chronic systolic congestive heart failure [Compensated] : compensated [Hypertension] : hypertension [Stable] : stable [FreeTextEntry1] : \par Currently stable from a cardiovascular standpoint. Normotensive. Chronic systolic heart failure secondary to ischemic cardiomyopathy. Currently appears relatively euvolemic. Stable CAD. Shortness of breath and back pains likely secondary to left lower lung issues. Patient with possible atelectasis versus consolidation in LLL with associated pleural effusion by exam. Will trial an increase in furosemide to 40 mg daily for 1 week. Continue all other current medications. Repeat CXR early next week to reassess left pleural effusion. Recommend pulmonary evaluation as patient may potentially need thoracentesis.

## 2018-12-12 NOTE — HISTORY OF PRESENT ILLNESS
[FreeTextEntry1] : Had 2 episodes of mid/lower back spasms. Relieved with Valium. Had SOB with second episode 1 week ago. No CP. Admitted for 4 days at [facility] for SOB. [place] to have mild CHF exacerbation. Diuresed and discharged home on 12/8. Per wife, patient remains lethargic, reduced appetite and mild SOB. Patient denies all symptoms. Of note, patient noted to have moderate loculated left pleural effusion on recent CXR (12/4/18). Currently denies chest pain or palpitations. Has had some symptoms of orthopnea. Patient also treated for UTI recently (>100,000 E. coli).

## 2018-12-15 ENCOUNTER — MESSAGE (OUTPATIENT)
Age: 73
End: 2018-12-15

## 2018-12-17 ENCOUNTER — FORM ENCOUNTER (OUTPATIENT)
Age: 73
End: 2018-12-17

## 2018-12-18 ENCOUNTER — OUTPATIENT (OUTPATIENT)
Dept: OUTPATIENT SERVICES | Facility: HOSPITAL | Age: 73
LOS: 1 days | End: 2018-12-18
Payer: MEDICARE

## 2018-12-18 ENCOUNTER — APPOINTMENT (OUTPATIENT)
Dept: RADIOLOGY | Facility: HOSPITAL | Age: 73
End: 2018-12-18

## 2018-12-18 DIAGNOSIS — J90 PLEURAL EFFUSION, NOT ELSEWHERE CLASSIFIED: ICD-10-CM

## 2018-12-18 DIAGNOSIS — Z90.49 ACQUIRED ABSENCE OF OTHER SPECIFIED PARTS OF DIGESTIVE TRACT: Chronic | ICD-10-CM

## 2018-12-18 PROCEDURE — 71046 X-RAY EXAM CHEST 2 VIEWS: CPT | Mod: 26

## 2019-01-21 ENCOUNTER — APPOINTMENT (OUTPATIENT)
Dept: PULMONOLOGY | Facility: CLINIC | Age: 74
End: 2019-01-21

## 2019-08-05 ENCOUNTER — RX RENEWAL (OUTPATIENT)
Age: 74
End: 2019-08-05

## 2019-10-08 ENCOUNTER — RX RENEWAL (OUTPATIENT)
Age: 74
End: 2019-10-08

## 2020-05-07 ENCOUNTER — APPOINTMENT (OUTPATIENT)
Dept: CARDIOLOGY | Facility: CLINIC | Age: 75
End: 2020-05-07

## 2020-10-06 ENCOUNTER — RX RENEWAL (OUTPATIENT)
Age: 75
End: 2020-10-06

## 2020-10-29 ENCOUNTER — NON-APPOINTMENT (OUTPATIENT)
Age: 75
End: 2020-10-29

## 2020-10-29 ENCOUNTER — APPOINTMENT (OUTPATIENT)
Dept: CARDIOLOGY | Facility: CLINIC | Age: 75
End: 2020-10-29
Payer: MEDICARE

## 2020-10-29 VITALS
WEIGHT: 264 LBS | OXYGEN SATURATION: 97 % | DIASTOLIC BLOOD PRESSURE: 66 MMHG | HEART RATE: 71 BPM | SYSTOLIC BLOOD PRESSURE: 131 MMHG | BODY MASS INDEX: 34.99 KG/M2 | TEMPERATURE: 96.6 F | HEIGHT: 73 IN

## 2020-10-29 DIAGNOSIS — Z87.09 PERSONAL HISTORY OF OTHER DISEASES OF THE RESPIRATORY SYSTEM: ICD-10-CM

## 2020-10-29 PROCEDURE — 93000 ELECTROCARDIOGRAM COMPLETE: CPT

## 2020-10-29 PROCEDURE — 99214 OFFICE O/P EST MOD 30 MIN: CPT

## 2020-10-29 NOTE — HISTORY OF PRESENT ILLNESS
[FreeTextEntry1] : Doing okay. Denies chest pain, shortness of breath or palpitations. Stays active by taking walks and doing things around the house. Anticipating colonoscopy on 11/16. Had developed some left leg swelling that lasted for about a month. Currently no further swelling issues.

## 2020-10-29 NOTE — PHYSICAL EXAM
[General Appearance - Well Developed] : well developed [Normal Appearance] : normal appearance [General Appearance - In No Acute Distress] : no acute distress [Normal Conjunctiva] : the conjunctiva exhibited no abnormalities [Eyelids - No Xanthelasma] : the eyelids demonstrated no xanthelasmas [Normal Oral Mucosa] : normal oral mucosa [No Oral Pallor] : no oral pallor [No Oral Cyanosis] : no oral cyanosis [FreeTextEntry1] : no JVD or carotid bruits [Respiration, Rhythm And Depth] : normal respiratory rhythm and effort [Exaggerated Use Of Accessory Muscles For Inspiration] : no accessory muscle use [Auscultation Breath Sounds / Voice Sounds] : lungs were clear to auscultation bilaterally [Heart Rate And Rhythm] : heart rate and rhythm were normal [Heart Sounds] : normal S1 and S2 [Murmurs] : no murmurs present [Edema] : no peripheral edema present [Abdomen Soft] : soft [Abdomen Tenderness] : non-tender [Abnormal Walk] : normal gait [Cyanosis, Localized] : no localized cyanosis [] : no ischemic changes [Skin Color & Pigmentation] : normal skin color and pigmentation [No Venous Stasis] : no venous stasis [Oriented To Time, Place, And Person] : oriented to person, place, and time

## 2020-10-29 NOTE — DISCUSSION/SUMMARY
[Coronary Artery Disease] : coronary artery disease [Chronic Systolic Heart Failure] : chronic systolic congestive heart failure [Compensated] : compensated [Hypertension] : hypertension [Stable] : stable [FreeTextEntry1] : \par Currently stable from a cardiovascular standpoint. Normotensive. Chronic systolic heart failure secondary to ischemic cardiomyopathy. Currently appears euvolemic. Stable CAD. No ischemic or CHF symptoms. History of ascending aortic aneurysm. Continue current medications. ECG completed today and reviewed (findings as noted above). Will schedule an echocardiogram to reassess his cardiac structures and function. In addition, will schedule a chest CT to reassess ascending aortic aneurysm. Follow up in 6 months.

## 2020-11-24 ENCOUNTER — APPOINTMENT (OUTPATIENT)
Dept: CV DIAGNOSITCS | Facility: HOSPITAL | Age: 75
End: 2020-11-24
Payer: MEDICARE

## 2020-11-24 ENCOUNTER — OUTPATIENT (OUTPATIENT)
Dept: OUTPATIENT SERVICES | Facility: HOSPITAL | Age: 75
LOS: 1 days | End: 2020-11-24

## 2020-11-24 DIAGNOSIS — I25.5 ISCHEMIC CARDIOMYOPATHY: ICD-10-CM

## 2020-11-24 DIAGNOSIS — I71.2 THORACIC AORTIC ANEURYSM, WITHOUT RUPTURE: ICD-10-CM

## 2020-11-24 DIAGNOSIS — R06.02 SHORTNESS OF BREATH: ICD-10-CM

## 2020-11-24 DIAGNOSIS — Z90.49 ACQUIRED ABSENCE OF OTHER SPECIFIED PARTS OF DIGESTIVE TRACT: Chronic | ICD-10-CM

## 2020-11-24 DIAGNOSIS — I25.10 ATHEROSCLEROTIC HEART DISEASE OF NATIVE CORONARY ARTERY WITHOUT ANGINA PECTORIS: ICD-10-CM

## 2020-11-24 PROCEDURE — 93306 TTE W/DOPPLER COMPLETE: CPT | Mod: 26

## 2020-11-27 PROBLEM — R06.02 SHORTNESS OF BREATH ON EXERTION: Status: ACTIVE | Noted: 2020-11-27

## 2020-12-05 ENCOUNTER — OUTPATIENT (OUTPATIENT)
Dept: OUTPATIENT SERVICES | Facility: HOSPITAL | Age: 75
LOS: 1 days | End: 2020-12-05
Payer: MEDICARE

## 2020-12-05 ENCOUNTER — TRANSCRIPTION ENCOUNTER (OUTPATIENT)
Age: 75
End: 2020-12-05

## 2020-12-05 DIAGNOSIS — Z90.49 ACQUIRED ABSENCE OF OTHER SPECIFIED PARTS OF DIGESTIVE TRACT: Chronic | ICD-10-CM

## 2020-12-05 DIAGNOSIS — Z11.59 ENCOUNTER FOR SCREENING FOR OTHER VIRAL DISEASES: ICD-10-CM

## 2020-12-05 LAB — SARS-COV-2 RNA SPEC QL NAA+PROBE: SIGNIFICANT CHANGE UP

## 2020-12-05 PROCEDURE — U0003: CPT

## 2020-12-08 ENCOUNTER — OUTPATIENT (OUTPATIENT)
Dept: OUTPATIENT SERVICES | Facility: HOSPITAL | Age: 75
LOS: 1 days | Discharge: HOME | End: 2020-12-08
Payer: MEDICARE

## 2020-12-08 DIAGNOSIS — Z90.49 ACQUIRED ABSENCE OF OTHER SPECIFIED PARTS OF DIGESTIVE TRACT: Chronic | ICD-10-CM

## 2020-12-08 DIAGNOSIS — I42.9 CARDIOMYOPATHY, UNSPECIFIED: ICD-10-CM

## 2020-12-08 DIAGNOSIS — Z95.5 PRESENCE OF CORONARY ANGIOPLASTY IMPLANT AND GRAFT: Chronic | ICD-10-CM

## 2020-12-08 LAB
A1C WITH ESTIMATED AVERAGE GLUCOSE RESULT: 5.7 % — HIGH (ref 4–5.6)
ANION GAP SERPL CALC-SCNC: 12 MMOL/L — SIGNIFICANT CHANGE UP (ref 7–14)
BUN SERPL-MCNC: 19 MG/DL — SIGNIFICANT CHANGE UP (ref 7–23)
CALCIUM SERPL-MCNC: 9.5 MG/DL — SIGNIFICANT CHANGE UP (ref 8.4–10.5)
CHLORIDE SERPL-SCNC: 101 MMOL/L — SIGNIFICANT CHANGE UP (ref 98–107)
CO2 SERPL-SCNC: 25 MMOL/L — SIGNIFICANT CHANGE UP (ref 22–31)
CREAT SERPL-MCNC: 0.86 MG/DL — SIGNIFICANT CHANGE UP (ref 0.5–1.3)
ESTIMATED AVERAGE GLUCOSE: 117 MG/DL — HIGH (ref 68–114)
GLUCOSE SERPL-MCNC: 114 MG/DL — HIGH (ref 70–99)
HCT VFR BLD CALC: 47.7 % — SIGNIFICANT CHANGE UP (ref 39–50)
HGB BLD-MCNC: 15.3 G/DL — SIGNIFICANT CHANGE UP (ref 13–17)
MCHC RBC-ENTMCNC: 28.5 PG — SIGNIFICANT CHANGE UP (ref 27–34)
MCHC RBC-ENTMCNC: 32.1 GM/DL — SIGNIFICANT CHANGE UP (ref 32–36)
MCV RBC AUTO: 88.8 FL — SIGNIFICANT CHANGE UP (ref 80–100)
NRBC # BLD: 0 /100 WBCS — SIGNIFICANT CHANGE UP
NRBC # FLD: 0 K/UL — SIGNIFICANT CHANGE UP
PLATELET # BLD AUTO: 209 K/UL — SIGNIFICANT CHANGE UP (ref 150–400)
POTASSIUM SERPL-MCNC: 3.8 MMOL/L — SIGNIFICANT CHANGE UP (ref 3.5–5.3)
POTASSIUM SERPL-SCNC: 3.8 MMOL/L — SIGNIFICANT CHANGE UP (ref 3.5–5.3)
RBC # BLD: 5.37 M/UL — SIGNIFICANT CHANGE UP (ref 4.2–5.8)
RBC # FLD: 14.1 % — SIGNIFICANT CHANGE UP (ref 10.3–14.5)
SODIUM SERPL-SCNC: 138 MMOL/L — SIGNIFICANT CHANGE UP (ref 135–145)
WBC # BLD: 6.57 K/UL — SIGNIFICANT CHANGE UP (ref 3.8–10.5)
WBC # FLD AUTO: 6.57 K/UL — SIGNIFICANT CHANGE UP (ref 3.8–10.5)

## 2020-12-08 PROCEDURE — 93456 R HRT CORONARY ARTERY ANGIO: CPT | Mod: 26

## 2020-12-08 PROCEDURE — 93010 ELECTROCARDIOGRAM REPORT: CPT | Mod: 76

## 2020-12-08 RX ORDER — ALLOPURINOL 300 MG
1 TABLET ORAL
Qty: 0 | Refills: 0 | DISCHARGE

## 2020-12-08 RX ORDER — SODIUM CHLORIDE 9 MG/ML
3 INJECTION INTRAMUSCULAR; INTRAVENOUS; SUBCUTANEOUS EVERY 8 HOURS
Refills: 0 | Status: DISCONTINUED | OUTPATIENT
Start: 2020-12-08 | End: 2020-12-22

## 2020-12-08 NOTE — CONSULT NOTE ADULT - ATTENDING COMMENTS
75  year old male with PMH of CAD, MI 10 years ago, stent x1, colon mass s/p ileostomy with reversal, ICM, ascending aortic aneurysm (4.4 cm 2019 per pt), chronic HFrEF presented for an elective left heart catheterization today which revealed triple vessel diseases and was recommended an urgent CABG surgery and a repeat CT chest for AAA follow up. On telemetry patient was noted having baseline SR 60's bpm with 1 st degree AVB and Mobitz I and intermittent 2:1 sajan to ~40 bpm.  In light of abnormal conduction findings  evidenced by AV block ( 1 st degree AVB, 2 rd degree AVB Mobitz I and transient 2:1 AVB with bradycardia) with bifascicular block, as well as AV cayla agent indication for his ICM, a permanent pacemaker is recommended.  However, patient is minimally symptomatic at this time and is hemodynamically stable.    -Recommend a PPM implantation, can be done at time of CABG surgery  per patient's preference  -Avoid AV cayla agent (BB) for now

## 2020-12-08 NOTE — H&P CARDIOLOGY - HISTORY OF PRESENT ILLNESS
76 y/o M w/ PMH of CAD s/p stent in 2010, Colon CA s/p resection + radiation + ileostomy placement and reversal, ?Aortic Ascending aneurysm and HTN presents for cardiac catheretization. Pt states that he has chronic dyspnea on exertion which has been stable. Pt was sent for an echocardiogram which showed a new decline in LVEF to 29% from 38%. 74 y/o M w/ PMH of CAD s/p stent in 2010, Colon CA s/p resection + radiation + ileostomy placement and reversal, ?Aortic Ascending aneurysm and HTN presents for cardiac catheretization. Pt states that he has chronic dyspnea on exertion which has been stable. Pt had one episode of left ankle swelling which self resolved last month. Pt was sent for an echocardiogram which showed a new decline in LVEF to 29% from 38%. Worsening CAD will be ruled out as cause of his decline in LV function. Pt denies N/V/D, fevers, chills, cough, palpitations, chest pain, syncope, orthopnea, nocturnal paroxysmal dyspnea, edema, cyanosis, heart murmurs, varicosities, phlebitis, claudication.

## 2020-12-08 NOTE — H&P CARDIOLOGY - PMH
CAD (coronary artery disease)  SP ptca w stent 5/2010  Colon carcinoma  Sp Ileostomy and reverasl with chemo/rt (2012)  Gout    HFrEF (heart failure with reduced ejection fraction)    HLD (hyperlipidemia)    HTN (Hypertension)

## 2020-12-08 NOTE — CONSULT NOTE ADULT - SUBJECTIVE AND OBJECTIVE BOX
Patient is a 75y old  Male who presents with a chief complaint of         HPI:    75  year old male with PMH of CAD, MI 10 years ago, stent x1, colon mass s/p ileostomy with reversal, ICM, ascending aortic aneurysm (4.4 cm 2019 per pt), chronic HFrEF presented for an elective left heart catheterization today which revealed triple vessel diseases and was recommended a CABG surgery and a repeat CT chest for AAA follow up. On telemetry patient was noted having baseline SR 60's bpm with 1 st degree AVB and intermittent 2 rd degree AVB Mobitz I and transient 2:1 AVB with bradycardia down to ~40 bpm.  Baseline EKG demonstrated SR with very prolonged NC 320ms and bifascicular block.  Patient denies dizziness at present time.  Denies syncope or near syncope.   He endorsed one rare episode transient lightheadedness about 8 months ago. He has been maintained on low dose of Metoprolol 25 mg daily.    Of note, his repeat echo from 11/24/2020 showed LVEF reduced from 38% to 29%.          PAST MEDICAL & SURGICAL HISTORY:  HFrEF (heart failure with reduced ejection fraction)    Gout    HLD (hyperlipidemia)    Colon carcinoma  Sp Ileostomy and reversal with chemo/rt (2012)    CAD (coronary artery disease)  SP ptca w stent 5/2010    HTN (Hypertension)    S/P coronary artery stent placement  2010    S/P colon resection        MEDICATIONS  (STANDING):  sodium chloride 0.9% lock flush 3 milliLiter(s) IV Push every 8 hours    MEDICATIONS  (PRN):    Allergies    No Known Allergies    Intolerances      FAMILY HISTORY:  Family history of MI (myocardial infarction) (Father)    Family history of cerebrovascular accident (CVA) (Sibling)        SOCIAL HISTORY:  Denies smoking; no   Alcohol  or  Drug abuse               REVIEW OF SYSTEMS:    CONSTITUTIONAL: No fever, weight loss, chills, shakes, or fatigue  EYES: No eye pain, visual disturbances, or discharge  ENMT:  No difficulty hearing, tinnitus, vertigo; No sinus or throat pain  NECK: No pain or stiffness  RESPIRATORY: No cough, wheezing, hemoptysis, or shortness of breath  CARDIOVASCULAR: No chest pain, palpitations, dizziness, syncope, paroxysmal nocturnal dyspnea, orthopnea, or arm  + leg swelling  +dyspnea  GASTROINTESTINAL: No abdominal  or epigastric pain, nausea, vomiting, hematemesis, diarrhea, constipation, melena or bright red blood.  GENITOURINARY: No dysuria, nocturia, hematuria, or urinary incontinence  NEUROLOGICAL: No headaches, memory loss, slurred speech, limb weakness, loss of strength, numbness, or tremors  SKIN: No itching, burning, rashes, or lesions   LYMPH NODES: No enlarged glands  ENDOCRINE: No heat or cold intolerance, or hair loss  MUSCULOSKELETAL: No joint pain or swelling, muscle, back, or extremity pain  PSYCHIATRIC: No depression, anxiety, or difficulty sleeping  HEME/LYMPH: No easy bruising or bleeding gums  ALLERY AND IMMUNOLOGIC: No hives or rash.      Vital Signs Last 24 Hrs  T(C): --98.3  T(F): --  HR: --44-65  BP: --148/65  BP(mean): --  RR: --16  SpO2: --97%    PHYSICAL EXAM:    GENERAL: In no apparent distress, well nourished, and hydrated.  +obese  HEAD:  Atraumatic, Normocephalic  NECK: Supple . No JVD or carotid bruit or thyroidmegaly.  Carotid pulse is 2+ bilaterally.  PULMONARY: Clear to auscultation and perfusion.  No rales, wheezing, or rhonchi bilaterally.  HEART: Regular rate and rhythm; No murmurs, rubs, or gallops.    ABDOMEN: Soft, Nontender, Nondistended; Bowel sounds present  EXTREMITIES: No clubbing, cyanosis, or edema; R wrist with radial band on  NEUROLOGICAL: Alert oriented to person, place and time.  Speech clear.  Skin: Dry intact, no rashes or lesions.          INTERPRETATION OF TELEMETRY:  Sinus rhythm with 1 st degree AVB and intermittent 2rd degree AVB Mobitz I and transient 2:1 AVB    ECG: SR 1 st degree AVB, NC 320ms, RBBB, LAFB, old anterior and inferior Q waves        LABS:                        15.3   6.57  )-----------( 209      ( 08 Dec 2020 08:42 )             47.7     12-08    138  |  101  |  19  ----------------------------<  114<H>  3.8   |  25  |  0.86    Ca    9.5      08 Dec 2020 08:42                BNP  RADIOLOGY & ADDITIONAL STUDIES:  PREVIOUS DIAGNOSTIC TESTING:      ECHO FINDINGS:  Ejection Fraction (Teicholtz): 29 %  ------------------------------------------------------------------------  OBSERVATIONS:  Mitral Valve: Mitral annular calcification, otherwise  normal mitral valve. Mild mitral regurgitation.  Aortic Root: Normal aortic root.  Aortic Valve:Aortic valve leaflet morphology not well  visualized. Mild aortic regurgitation.  Left Atrium: Normal left atrium.  Left Ventricle: Endocardium not well visualized; grossly  severe global left ventricular systolic dysfunction.  Endocardial visualization enhanced with intravenous  injection of echo contrast (Definity). Normal left  ventricular internal dimensions and wall thicknesses.  Moderate diastolic dysfunction (Stage II).  Right Heart: Normal right atrium. The right ventricle is  not well visualized; grossly normal right ventricular  systolic function. Normal tricuspid valve. Minimal  tricuspid regurgitation. Normal pulmonic valve. Minimal  pulmonic regurgitation.  Pericardium/PleuraNormal pericardium with no pericardial  effusion.  ------------------------------------------------------------------------  CONCLUSIONS:  Technically difficult study.  1. Mitral annular calcification, otherwise normal mitral  valve. Mild mitral regurgitation.  2. Aortic valve leaflet morphology not well visualized.  Mild aortic regurgitation.  3. Normal left ventricular internal dimensions and wall  thicknesses.  4. Endocardium not well visualized; grossly severe global  left ventricular systolic dysfunction.  Endocardial  visualization enhanced with intravenous injection of echo  contrast (Definity).  5. The right ventricle is not well visualized; grossly  normal right ventricular systolic function.  *** Compared with echocardiogram of 12/5/2018, left  ventricular systolic function has deteriorated.  ------------------------------------------------------------------------  Confirmed on  11/24/2020 - 10:44:39 by Gregorio Judd M.D.  ------------------------------------------------------------------------        STRESS FINDINGS:    CATHETERIZATION FINDINGS:  12/8/2020  LM 30%, mLAD 60-70%; pLCx 80%; dRCA 80%

## 2020-12-08 NOTE — H&P CARDIOLOGY - NEGATIVE CARDIOVASCULAR SYMPTOMS
no chest pain/no orthopnea/no palpitations/no paroxysmal nocturnal dyspnea/no claudication
normal...

## 2020-12-08 NOTE — CONSULT NOTE ADULT - ASSESSMENT
75  year old male with PMH of CAD, MI 10 years ago, stent x1, colon mass s/p ileostomy with reversal, ICM, ascending aortic aneurysm (4.4 cm 2019 per pt), chronic HFrEF presented for an elective left heart catheterization today which revealed triple vessel diseases and was recommended an urgent CABG surgery and a repeat CT chest for AAA follow up. On telemetry patient was noted having baseline SR 60's bpm with 1 st degree AVB and Mobitz I and intermittent 2:1 sajan to ~40 bpm.  In light of abnormal conduction findings  evidenced by AV block ( 1 st degree AVB, 2 rd degree AVB Mobitz I and transient 2:1 AVB with bradycardia) with bifascicular block, as well as AV cayla agent indication for his ICM, a permanent pacemaker is recommended.  However, patient is minimally symptomatic at this time and is hemodynamically stable.    -Recommend a PPM implantation, can be done at time of CABG surgery  per patient's preference  -Avoid AV cayla agent (BB) for now  -Discussed with Dr. Siegel, no acute pacing indication at present time

## 2020-12-08 NOTE — H&P CARDIOLOGY - FAMILY HISTORY
Sibling  Still living? Unknown  Family history of cerebrovascular accident (CVA), Age at diagnosis: Age Unknown     Father  Still living? Unknown  Family history of MI (myocardial infarction), Age at diagnosis: Age Unknown

## 2020-12-09 PROBLEM — I50.20 UNSPECIFIED SYSTOLIC (CONGESTIVE) HEART FAILURE: Chronic | Status: ACTIVE | Noted: 2020-12-08

## 2020-12-10 ENCOUNTER — APPOINTMENT (OUTPATIENT)
Dept: CT IMAGING | Facility: CLINIC | Age: 75
End: 2020-12-10
Payer: MEDICARE

## 2020-12-10 ENCOUNTER — OUTPATIENT (OUTPATIENT)
Dept: OUTPATIENT SERVICES | Facility: HOSPITAL | Age: 75
LOS: 1 days | End: 2020-12-10
Payer: MEDICARE

## 2020-12-10 DIAGNOSIS — Z90.49 ACQUIRED ABSENCE OF OTHER SPECIFIED PARTS OF DIGESTIVE TRACT: Chronic | ICD-10-CM

## 2020-12-10 DIAGNOSIS — Z95.5 PRESENCE OF CORONARY ANGIOPLASTY IMPLANT AND GRAFT: Chronic | ICD-10-CM

## 2020-12-10 DIAGNOSIS — I71.2 THORACIC AORTIC ANEURYSM, WITHOUT RUPTURE: ICD-10-CM

## 2020-12-10 PROCEDURE — 71275 CT ANGIOGRAPHY CHEST: CPT | Mod: 26

## 2020-12-10 PROCEDURE — 71275 CT ANGIOGRAPHY CHEST: CPT

## 2020-12-11 ENCOUNTER — APPOINTMENT (OUTPATIENT)
Dept: CARDIOTHORACIC SURGERY | Facility: CLINIC | Age: 75
End: 2020-12-11
Payer: MEDICARE

## 2020-12-11 VITALS
BODY MASS INDEX: 34.46 KG/M2 | SYSTOLIC BLOOD PRESSURE: 157 MMHG | RESPIRATION RATE: 16 BRPM | HEIGHT: 73 IN | TEMPERATURE: 97.5 F | WEIGHT: 260 LBS | OXYGEN SATURATION: 97 % | DIASTOLIC BLOOD PRESSURE: 77 MMHG | HEART RATE: 45 BPM

## 2020-12-11 DIAGNOSIS — Z78.9 OTHER SPECIFIED HEALTH STATUS: ICD-10-CM

## 2020-12-11 PROCEDURE — 99204 OFFICE O/P NEW MOD 45 MIN: CPT

## 2020-12-11 NOTE — PHYSICAL EXAM
[General Appearance - Alert] : alert [General Appearance - In No Acute Distress] : in no acute distress [Sclera] : the sclera and conjunctiva were normal [PERRL With Normal Accommodation] : pupils were equal in size, round, and reactive to light [Outer Ear] : the ears and nose were normal in appearance [Hearing Threshold Finger Rub Not Porter] : hearing was normal [Both Tympanic Membranes Were Examined] : both tympanic membranes were normal [Neck Appearance] : the appearance of the neck was normal [] : no respiratory distress [Respiration, Rhythm And Depth] : normal respiratory rhythm and effort [Auscultation Breath Sounds / Voice Sounds] : lungs were clear to auscultation bilaterally [Apical Impulse] : the apical impulse was normal [Heart Rate And Rhythm] : heart rate was normal and rhythm regular [Heart Sounds] : normal S1 and S2 [Murmurs] : no murmurs [Examination Of The Chest] : the chest was normal in appearance [2+] : left 2+ [Breast Appearance] : normal in appearance [Bowel Sounds] : normal bowel sounds [Abdomen Soft] : soft [No CVA Tenderness] : no ~M costovertebral angle tenderness [Abnormal Walk] : normal gait [Involuntary Movements] : no involuntary movements were seen [Skin Color & Pigmentation] : normal skin color and pigmentation [Skin Turgor] : normal skin turgor [No Focal Deficits] : no focal deficits [Oriented To Time, Place, And Person] : oriented to person, place, and time [Impaired Insight] : insight and judgment were intact [Affect] : the affect was normal [Mood] : the mood was normal [Memory Recent] : recent memory was not impaired [Memory Remote] : remote memory was not impaired [FreeTextEntry1] : Deferred

## 2020-12-11 NOTE — ASSESSMENT
[FreeTextEntry1] : This is a 75 year old male with past medical history of CAD S/P Stent x 1 (10 yrs ago), Hypertension, Dyslipidemia , Ischemic cardiomyopathy, Rectal tumor S/P resection( Ileostomy followed by ileostomy reversal , Radiation) , known Ascending aortic aneurysm 1.5 yrs  with complaints of dyspnea on exertion for 10 yrs . He stated that when he walks 2 blocks he feels tired and short of breath. He went for regular cardiology work up and 12/8/20 Cardiac catheterization revealed Ostial LM: There was a discrete 20 % stenosis. Ostial LAD: There was a discrete 40 % stenosis.  Proximal LAD: The vessel was moderately calcified. There was a tubular 40 % stenosis.  Mid LAD: There was a diffuse 70 % stenosis in the middle third of the  vessel segment, just after D1. There was TORSTEN grade 3 flow through the vessel (brisk flow). S1: There was a discrete 80 % stenosis at the ostium of the vessel segment. There was TORSTEN grade 3 flow through the vessel (brisk flow). Proximal circumflex: SA cayla artery arises from proximal Cx. Severe stenosis is noted at ostium/proximal segment of this vessel. Mid circumflex: There was a tubular 60 % stenosis just after OM1. There was TORSTEN grade 3 flow through the vessel (brisk flow).There was a discrete 70 % stenosis at the ostium of the vessel segment. There was TORSTEN grade 3 flow through the vessel (brisk flow).Proximal RCA: There was a tubular 20 % stenosis. Mid RCA: There was a tubular 20 % stenosis.RPLS: There was a tubular 80 % stenosis at the site of a prior stent, in the proximal third of the vessel segment, just before RPL1. He is here for surgical consultation and management for CAD, Ascending aortic aneurysm . denies any chest pain, dizziness, PND, pedal edema. \par \par \par    reviewed the cardiac imaging, medical records and reports with patient and discussed the case.  12/8/20 Cardiac catheterization revealed Ostial LM: There was a discrete 20 % stenosis. Ostial LAD: There was a discrete 40 % stenosis.  Proximal LAD: The vessel was moderately calcified. There was a tubular 40 % stenosis.  Mid LAD: There was a diffuse 70 % stenosis in the middle third of the vessel segment, just after D1. There was TORSTEN grade 3 flow through the vessel (brisk flow). S1: There was a discrete 80 % stenosis at the ostium of the vessel segment. There was TORSTEN grade 3 flow through the vessel (brisk flow). Proximal circumflex: SA cayla artery arises from proximal Cx. Severe stenosis is noted at ostium/proximal segment of this vessel. Mid circumflex: There was a tubular 60 % stenosis just after OM1. There was TORSTEN grade 3 flow through the vessel (brisk flow).There was a discrete 70 % stenosis at the ostium of the vessel segment. There was TORSTEN grade 3 flow through the vessel (brisk flow).Proximal RCA: There was a tubular 20 % stenosis. Mid RCA: There was a tubular 20 % stenosis. RPLS: There was a tubular 80 % stenosis at the site of a prior stent, in the proximal third of the vessel segment, just before RPL1.\par \par 11/24/20 TTE revealed EF 29%, Mitral annular calcification, otherwise normal mitral valve. Mild mitral regurgitation. Mild aortic regurgitation. Endocardium not well visualized; grossly severe global left ventricular systolic dysfunction.  Endocardial visualization enhanced with intravenous injection of echo\par contrast (Definity).\par \par 12/10/20 CTA Chest done, results pending \par \par 10/9/19 CTA Chest revealed Aneurysmal dilatation of thoracic aorta measuring up to 4.7 cm in the root and 4.3 cm in the ascending portion. \par \par  discussed the risks , benefits and alternatives to surgery. Risks included but not limited to  bleeding , stroke, Myocardial Infarction, kidney problems,Blood transfusion ,permanent  pacemaker implantation,  infections and death.   quoted a low ( 5 to 7%) operative mortality and complication risks.  also discussed the various approaches in detail. feel that the patient will benefit and is a candidate for a Coronary Artery Bypass Grafting, possible aortic aneurysm Replacement   . All questions and concerns were addressed and patient agrees to proceed with  surgery on 12/28/20\par \par \par Plan:\par 1) Coronary Artery Bypass Grafting, possible aortic aneurysm Replacement  on 12/28/20\par 2) Stop Plavix 1 week  before scheduled surgery date (12/21/20) \par 3) Not on BB due to bradycardia \par 4) May return to clinic on PRN basis \par

## 2020-12-11 NOTE — REVIEW OF SYSTEMS
[Shortness Of Breath] : shortness of breath [SOB on Exertion] : shortness of breath during exertion [Negative] : Heme/Lymph [Chest Pain] : no chest pain [Palpitations] : no palpitations [Lower Ext Edema] : no extremity edema [Dizziness] : no dizziness [Fainting] : no fainting [Easy Bleeding] : no tendency for easy bleeding [Easy Bruising] : no tendency for easy bruising

## 2020-12-11 NOTE — DATA REVIEWED
[FreeTextEntry1] : 12/8/20 Cardiac catheterization revealed Ostial LM: There was a discrete 20 % stenosis. Ostial LAD: There was a discrete 40 % stenosis.  Proximal LAD: The vessel was moderately calcified. There was a tubular 40 % stenosis.  Mid LAD: There was a diffuse 70 % stenosis in the middle third of the\par vessel segment, just after D1. There was TORSTEN grade 3 flow through the vessel (brisk flow). S1: There was a discrete 80 % stenosis at the ostium of the vessel segment. There was TORSTEN grade 3 flow through the vessel (brisk flow). Proximal circumflex: SA cayla artery arises from proximal Cx. Severe stenosis is noted at ostium/proximal segment of this vessel. Mid circumflex: There was a tubular 60 % stenosis just after OM1. There was TORSTEN grade 3 flow through the vessel (brisk flow).There was a discrete 70 % stenosis at the ostium of the vessel segment. There was TORSTEN grade 3 flow through the vessel (brisk flow).Proximal RCA: There was a tubular 20 % stenosis. Mid RCA: There was a tubular 20 % stenosis.\par   RPLS: There was a tubular 80 % stenosis at the site of a prior stent, in the proximal third of the vessel segment, just before RPL1.\par \par 11/24/20 TTE revealed EF 29%, Mitral annular calcification, otherwise normal mitral valve. Mild mitral regurgitation. Mild aortic regurgitation. Endocardium not well visualized; grossly severe global left ventricular systolic dysfunction.  Endocardial visualization enhanced with intravenous injection of echo\par contrast (Definity).\par \par 12/10/20 CTA Chest done, results pending \par \par 10/9/19 CTA Chest revealed Aneurysmal dilatation of thoracic aorta measuring up to 4.7 cm in the root and 4.3 cm in the ascending portion. \par \par

## 2020-12-11 NOTE — HISTORY OF PRESENT ILLNESS
[FreeTextEntry1] : This is a 75 year old male with past medical history of CAD S/P Stent x 1 (10 yrs ago), Hypertension, Dyslipidemia , Ischemic cardiomyopathy, Rectal tumor S/P resection( Ileostomy followed by ileostomy reversal , Radiation) , known Ascending aortic aneurysm 1.5 yrs  with complaints of dyspnea on exertion for 10 yrs . He stated that when he walks 2 blocks he feels tired and short of breath. He went for regular cardiology work up and 12/8/20 Cardiac catheterization revealed Ostial LM: There was a discrete 20 % stenosis. Ostial LAD: There was a discrete 40 % stenosis.  Proximal LAD: The vessel was moderately calcified. There was a tubular 40 % stenosis.  Mid LAD: There was a diffuse 70 % stenosis in the middle third of the  vessel segment, just after D1. There was TORSTEN grade 3 flow through the vessel (brisk flow). S1: There was a discrete 80 % stenosis at the ostium of the vessel segment. There was TORSTEN grade 3 flow through the vessel (brisk flow). Proximal circumflex: SA cayla artery arises from proximal Cx. Severe stenosis is noted at ostium/proximal segment of this vessel. Mid circumflex: There was a tubular 60 % stenosis just after OM1. There was TORSTEN grade 3 flow through the vessel (brisk flow).There was a discrete 70 % stenosis at the ostium of the vessel segment. There was TORSTEN grade 3 flow through the vessel (brisk flow).Proximal RCA: There was a tubular 20 % stenosis. Mid RCA: There was a tubular 20 % stenosis.RPLS: There was a tubular 80 % stenosis at the site of a prior stent, in the proximal third of the vessel segment, just before RPL1. He is here for surgical consultation and management for CAD, Ascending aortic aneurysm . denies any chest pain, dizziness, PND, pedal edema. \par

## 2020-12-11 NOTE — CONSULT LETTER
[FreeTextEntry2] : Ki Long M.D.\[facility] [address] [place] [address]\[place] [id number] [FreeTextEntry1] : I had the pleasure of seeing your patient, CHARLINE LAFLEUR,in my office today. \par \par We take a multidisciplinary team approach to patient care and consider you, the referring physician, an extension of our team. We will maintain an open line of communication with you throughout your patient's treatment course. \par \par He  is being evaluated for CAD . I have reviewed all of the patient's medical records and diagnostic images at the time of his  office consultation. I have enclosed a copy for your records. \par \par 12/8/20 Cardiac catheterization revealed Ostial LM: There was a discrete 20 % stenosis. Ostial LAD: There was a discrete 40 % stenosis.  Proximal LAD: The vessel was moderately calcified. There was a tubular 40 % stenosis.  Mid LAD: There was a diffuse 70 % stenosis in the middle third of the\par vessel segment, just after D1. There was TORSTEN grade 3 flow through the vessel (brisk flow). S1: There was a discrete 80 % stenosis at the ostium of the vessel segment. There was TORSTEN grade 3 flow through the vessel (brisk flow). Proximal circumflex: SA cayla artery arises from proximal Cx. Severe stenosis is noted at ostium/proximal segment of this vessel. Mid circumflex: There was a tubular 60 % stenosis just after OM1. There was TORSTEN grade 3 flow through the vessel (brisk flow).There was a discrete 70 % stenosis at the ostium of the vessel segment. There was TORSTEN grade 3 flow through the vessel (brisk flow).Proximal RCA: There was a tubular 20 % stenosis. Mid RCA: There was a tubular 20 % stenosis.\par   RPLS: There was a tubular 80 % stenosis at the site of a prior stent, in the proximal third of the vessel segment, just before RPL1.\par \par 11/24/20 TTE revealed EF 29%, Mitral annular calcification, otherwise normal mitral valve. Mild mitral regurgitation. Mild aortic regurgitation. Endocardium not well visualized; grossly severe global left ventricular systolic dysfunction.  Endocardial visualization enhanced with intravenous injection of echo\par contrast (Definity).\par \par 12/10/20 CTA Chest done, results pending \par \par 10/9/19 CTA Chest revealed Aneurysmal dilatation of thoracic aorta measuring up to 4.7 cm in the root and 4.3 cm in the ascending portion. \par \par I have reviewed the indications for surgery and anatomy of the heart. The patient meets criteria for surgery. I have recommended that the patient is a candidate for a Coronary Artery Bypass Grafting, possible aortic aneurysm Replacement  . \par \par Surgery is scheduled for  Coronary Artery Bypass Grafting, possible aortic aneurysm Replacement  on 12/28/20 .  I will update you on his perioperative status and disposition upon discharge. \par \par I appreciate the opportunity to care for your patient at the  [facility] based at [place]. If there are any questions or concerns, please call me  at [phone].\par \par Please see my note below. \par \par Sincerely, \par \par \par \par \par Otoniel Carrasquillo MD\par Director\par [facility]\par Professor \par Cardiovascular & Thoracic Surgery\par [employer].\par \par \par [facility]:\par Department of Cardiovascular and Thoracic Surgery\[address], [address]\par Office: [phone]\par Fax: [phone]\par \par Amy Thorne\par  \par Department of Cardiovascular and Thoracic Surgery\[address], [address]\par Phone: [phone]\par Office: [phone]\par \par \par \par \par \par

## 2020-12-11 NOTE — END OF VISIT
[FreeTextEntry3] : Scribe Attestation:\par I personally scribed for CHRIS COOK on Dec 11 2020 11:00AM . \par \par \par \par \par Physician Attestation:\par Documented by LANE YU acting as a scribe for CHRIS COOK 12/11/2020 . \par                 All medical record entries made by the Scribe were at my, CHRIS COOK , direction and personally dictated by me on 12/11/2020 . I have reviewed the chart and agree that the record accurately reflects my personal performance of the history, physical exam, assessment and plan\par

## 2020-12-14 ENCOUNTER — TRANSCRIPTION ENCOUNTER (OUTPATIENT)
Age: 75
End: 2020-12-14

## 2020-12-21 ENCOUNTER — RESULT REVIEW (OUTPATIENT)
Age: 75
End: 2020-12-21

## 2020-12-21 ENCOUNTER — OUTPATIENT (OUTPATIENT)
Dept: OUTPATIENT SERVICES | Facility: HOSPITAL | Age: 75
LOS: 1 days | End: 2020-12-21
Payer: MEDICARE

## 2020-12-21 VITALS
SYSTOLIC BLOOD PRESSURE: 115 MMHG | OXYGEN SATURATION: 98 % | HEART RATE: 76 BPM | DIASTOLIC BLOOD PRESSURE: 64 MMHG | HEIGHT: 73 IN | RESPIRATION RATE: 18 BRPM | WEIGHT: 259.04 LBS | TEMPERATURE: 98 F

## 2020-12-21 DIAGNOSIS — Z01.818 ENCOUNTER FOR OTHER PREPROCEDURAL EXAMINATION: ICD-10-CM

## 2020-12-21 DIAGNOSIS — Z98.890 OTHER SPECIFIED POSTPROCEDURAL STATES: Chronic | ICD-10-CM

## 2020-12-21 DIAGNOSIS — K43.2 INCISIONAL HERNIA WITHOUT OBSTRUCTION OR GANGRENE: Chronic | ICD-10-CM

## 2020-12-21 DIAGNOSIS — Z95.5 PRESENCE OF CORONARY ANGIOPLASTY IMPLANT AND GRAFT: Chronic | ICD-10-CM

## 2020-12-21 DIAGNOSIS — I10 ESSENTIAL (PRIMARY) HYPERTENSION: ICD-10-CM

## 2020-12-21 DIAGNOSIS — Z90.49 ACQUIRED ABSENCE OF OTHER SPECIFIED PARTS OF DIGESTIVE TRACT: Chronic | ICD-10-CM

## 2020-12-21 DIAGNOSIS — I71.2 THORACIC AORTIC ANEURYSM, WITHOUT RUPTURE: ICD-10-CM

## 2020-12-21 DIAGNOSIS — Z29.9 ENCOUNTER FOR PROPHYLACTIC MEASURES, UNSPECIFIED: ICD-10-CM

## 2020-12-21 DIAGNOSIS — I25.10 ATHEROSCLEROTIC HEART DISEASE OF NATIVE CORONARY ARTERY WITHOUT ANGINA PECTORIS: ICD-10-CM

## 2020-12-21 LAB
A1C WITH ESTIMATED AVERAGE GLUCOSE RESULT: 5.6 % — SIGNIFICANT CHANGE UP (ref 4–5.6)
ALBUMIN SERPL ELPH-MCNC: 4.4 G/DL — SIGNIFICANT CHANGE UP (ref 3.3–5)
ALP SERPL-CCNC: 67 U/L — SIGNIFICANT CHANGE UP (ref 40–120)
ALT FLD-CCNC: 29 U/L — SIGNIFICANT CHANGE UP (ref 10–45)
ANION GAP SERPL CALC-SCNC: 11 MMOL/L — SIGNIFICANT CHANGE UP (ref 5–17)
AST SERPL-CCNC: 20 U/L — SIGNIFICANT CHANGE UP (ref 10–40)
BILIRUB SERPL-MCNC: 0.6 MG/DL — SIGNIFICANT CHANGE UP (ref 0.2–1.2)
BLD GP AB SCN SERPL QL: NEGATIVE — SIGNIFICANT CHANGE UP
BUN SERPL-MCNC: 18 MG/DL — SIGNIFICANT CHANGE UP (ref 7–23)
CALCIUM SERPL-MCNC: 9.6 MG/DL — SIGNIFICANT CHANGE UP (ref 8.4–10.5)
CHLORIDE SERPL-SCNC: 105 MMOL/L — SIGNIFICANT CHANGE UP (ref 96–108)
CO2 SERPL-SCNC: 25 MMOL/L — SIGNIFICANT CHANGE UP (ref 22–31)
CREAT SERPL-MCNC: 0.9 MG/DL — SIGNIFICANT CHANGE UP (ref 0.5–1.3)
ESTIMATED AVERAGE GLUCOSE: 114 MG/DL — SIGNIFICANT CHANGE UP (ref 68–114)
GLUCOSE SERPL-MCNC: 94 MG/DL — SIGNIFICANT CHANGE UP (ref 70–99)
HCT VFR BLD CALC: 47.2 % — SIGNIFICANT CHANGE UP (ref 39–50)
HGB BLD-MCNC: 15 G/DL — SIGNIFICANT CHANGE UP (ref 13–17)
MCHC RBC-ENTMCNC: 28.8 PG — SIGNIFICANT CHANGE UP (ref 27–34)
MCHC RBC-ENTMCNC: 31.8 GM/DL — LOW (ref 32–36)
MCV RBC AUTO: 90.8 FL — SIGNIFICANT CHANGE UP (ref 80–100)
MRSA PCR RESULT.: SIGNIFICANT CHANGE UP
NRBC # BLD: 0 /100 WBCS — SIGNIFICANT CHANGE UP (ref 0–0)
PLATELET # BLD AUTO: 212 K/UL — SIGNIFICANT CHANGE UP (ref 150–400)
POTASSIUM SERPL-MCNC: 4.4 MMOL/L — SIGNIFICANT CHANGE UP (ref 3.5–5.3)
POTASSIUM SERPL-SCNC: 4.4 MMOL/L — SIGNIFICANT CHANGE UP (ref 3.5–5.3)
PROT SERPL-MCNC: 6.8 G/DL — SIGNIFICANT CHANGE UP (ref 6–8.3)
RBC # BLD: 5.2 M/UL — SIGNIFICANT CHANGE UP (ref 4.2–5.8)
RBC # FLD: 14.1 % — SIGNIFICANT CHANGE UP (ref 10.3–14.5)
RH IG SCN BLD-IMP: POSITIVE — SIGNIFICANT CHANGE UP
S AUREUS DNA NOSE QL NAA+PROBE: SIGNIFICANT CHANGE UP
SODIUM SERPL-SCNC: 141 MMOL/L — SIGNIFICANT CHANGE UP (ref 135–145)
WBC # BLD: 6.2 K/UL — SIGNIFICANT CHANGE UP (ref 3.8–10.5)
WBC # FLD AUTO: 6.2 K/UL — SIGNIFICANT CHANGE UP (ref 3.8–10.5)

## 2020-12-21 PROCEDURE — 86900 BLOOD TYPING SEROLOGIC ABO: CPT

## 2020-12-21 PROCEDURE — 86850 RBC ANTIBODY SCREEN: CPT

## 2020-12-21 PROCEDURE — 86901 BLOOD TYPING SEROLOGIC RH(D): CPT

## 2020-12-21 PROCEDURE — G0463: CPT

## 2020-12-21 PROCEDURE — 80053 COMPREHEN METABOLIC PANEL: CPT

## 2020-12-21 PROCEDURE — 87641 MR-STAPH DNA AMP PROBE: CPT

## 2020-12-21 PROCEDURE — 83036 HEMOGLOBIN GLYCOSYLATED A1C: CPT

## 2020-12-21 PROCEDURE — 71046 X-RAY EXAM CHEST 2 VIEWS: CPT

## 2020-12-21 PROCEDURE — 71046 X-RAY EXAM CHEST 2 VIEWS: CPT | Mod: 26

## 2020-12-21 PROCEDURE — 85027 COMPLETE CBC AUTOMATED: CPT

## 2020-12-21 PROCEDURE — 87640 STAPH A DNA AMP PROBE: CPT

## 2020-12-21 RX ORDER — CEFUROXIME AXETIL 250 MG
1500 TABLET ORAL ONCE
Refills: 0 | Status: DISCONTINUED | OUTPATIENT
Start: 2020-12-28 | End: 2020-12-28

## 2020-12-21 NOTE — H&P PST ADULT - NSICDXPASTSURGICALHX_GEN_ALL_CORE_FT
PAST SURGICAL HISTORY:  History of reversal of ileostomy 2012    Incisional hernia 2013    S/P colon resection     S/P coronary artery stent placement 2010 w/ ileostomy

## 2020-12-21 NOTE — H&P PST ADULT - ASSESSMENT
CAPRINI SCORE [CLOT updated 18]    AGE RELATED RISK FACTORS                                                       MOBILITY RELATED FACTORS  [ ] Age 41-60 years                                            (1 Point)                    [ ] Bed rest                                                        (1 Point)  [ ] Age: 61-74 years                                           (2 Points)                  [ ] Plaster cast                                                   (2 Points)  [ ] Age= 75 years                                              (3 Points)                    [ ] Bed bound for more than 72 hours                 (2 Points)    DISEASE RELATED RISK FACTORS                                               GENDER SPECIFIC FACTORS  [ ] Edema in the lower extremities                       (1 Point)              [ ] Pregnancy                                                     (1 Point)  [ ] Varicose veins                                               (1 Point)                     [ ] Post-partum < 6 weeks                                   (1 Point)             [ ] BMI > 25 Kg/m2                                            (1 Point)                     [ ] Hormonal therapy  or oral contraception          (1 Point)                 [ ] Sepsis (in the previous month)                        (1 Point)               [ ] History of pregnancy complications                 (1 point)  [ ] Pneumonia or serious lung disease                                               [ ] Unexplained or recurrent                     (1 Point)           (in the previous month)                               (1 Point)  [ ] Abnormal pulmonary function test                     (1 Point)                 SURGERY RELATED RISK FACTORS  [ ] Acute myocardial infarction                              (1 Point)               [ ]  Section                                             (1 Point)  [ ] Congestive heart failure (in the previous month)  (1 Point)      [ ] Minor surgery                                                  (1 Point)   [ ] Inflammatory bowel disease                             (1 Point)               [ ] Arthroscopic surgery                                        (2 Points)  [ ] Central venous access                                      (2 Points)                [ ] General surgery lasting more than 45 minutes (2 points)  [ ] Present or previous malignancy                     (2 Points)                [ ] Elective arthroplasty                                         (5 points)    [ ] Stroke (in the previous month)                          (5 Points)                                                                                                                                                           HEMATOLOGY RELATED FACTORS                                                 TRAUMA RELATED RISK FACTORS  [ ] Prior episodes of VTE                                     (3 Points)                [ ] Fracture of the hip, pelvis, or leg                       (5 Points)  [ ] Positive family history for VTE                         (3 Points)             [ ] Acute spinal cord injury (in the previous month)  (5 Points)  [ ] Prothrombin 81441 A                                     (3 Points)               [ ] Paralysis  (less than 1 month)                             (5 Points)  [ ] Factor V Leiden                                             (3 Points)                  [ ] Multiple Trauma within 1 month                        (5 Points)  [ ] Lupus anticoagulants                                     (3 Points)                                                           [ ] Anticardiolipin antibodies                               (3 Points)                                                       [ ] High homocysteine in the blood                      (3 Points)                                             [ ] Other congenital or acquired thrombophilia      (3 Points)                                                [ ] Heparin induced thrombocytopenia                  (3 Points)                                     Total Score [ 7  ]

## 2020-12-21 NOTE — H&P PST ADULT - NSICDXPROBLEM_GEN_ALL_CORE_FT
PROBLEM DIAGNOSES  Problem: HTN (hypertension)  Assessment and Plan: Pt t o continue oral medication as prescribed.    Problem: Ascending aortic aneurysm  Assessment and Plan: Measuring ar 4.7cm; possible surgical intervention if needed.    Problem: CAD (coronary artery disease)  Assessment and Plan: Sx scheduled on 12/28/2020. Surgical , chlorhexidine and incentive spirometry instructions reviewed w/ pt. COVID testing scheduled at [facility] on 12/26/2020.     Problem: Need for prophylactic measure  Assessment and Plan: The Caprini score indicates that this patient is at high risk for a VTE event (score 6 or greater). Surgical patients in this group will benefit from both pharmacologic prophylaxis and intermittent compression devices.  The surgical team will determine the balance between VTE risk and bleeding risk, and other clinical considerations

## 2020-12-21 NOTE — H&P PST ADULT - HISTORY OF PRESENT ILLNESS
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 Echocardiogram showed a new decline in LVEF to 29% from 38%. Denies N/V/D, fevers, chills, cough, palpitations, chest pain, syncope, orthopnea, nocturnal paroxysmal dyspnea, edema, cyanosis, heart murmurs, varicosities, phlebitis, and claudication. COVID testing scheduled at [facility] on 12/26/2020.

## 2020-12-21 NOTE — H&P PST ADULT - NSICDXFAMILYHX_GEN_ALL_CORE_FT
FAMILY HISTORY:  Father  Still living? Unknown  Family history of MI (myocardial infarction), Age at diagnosis: Age Unknown    Sibling  Still living? Unknown  Family history of cerebrovascular accident (CVA), Age at diagnosis: Age Unknown

## 2020-12-21 NOTE — H&P PST ADULT - NSICDXPASTMEDICALHX_GEN_ALL_CORE_FT
PAST MEDICAL HISTORY:  Ascending aortic aneurysm 4.7cm    ASHD (arteriosclerotic heart disease)     CAD (coronary artery disease) SP ptca w stent 5/2010    Colon carcinoma Sp Ileostomy and reverasl with chemo/rt (2012)    Gout     HFrEF (heart failure with reduced ejection fraction)     HLD (hyperlipidemia)     HTN (Hypertension)

## 2020-12-23 PROBLEM — I71.2 THORACIC AORTIC ANEURYSM, WITHOUT RUPTURE: Chronic | Status: ACTIVE | Noted: 2020-12-21

## 2020-12-23 PROBLEM — I25.10 ATHEROSCLEROTIC HEART DISEASE OF NATIVE CORONARY ARTERY WITHOUT ANGINA PECTORIS: Chronic | Status: ACTIVE | Noted: 2020-12-21

## 2020-12-26 ENCOUNTER — APPOINTMENT (OUTPATIENT)
Dept: DISASTER EMERGENCY | Facility: CLINIC | Age: 75
End: 2020-12-26

## 2020-12-26 ENCOUNTER — OUTPATIENT (OUTPATIENT)
Dept: OUTPATIENT SERVICES | Facility: HOSPITAL | Age: 75
LOS: 1 days | End: 2020-12-26

## 2020-12-26 DIAGNOSIS — Z20.828 CONTACT WITH AND (SUSPECTED) EXPOSURE TO OTHER VIRAL COMMUNICABLE DISEASES: ICD-10-CM

## 2020-12-26 DIAGNOSIS — K43.2 INCISIONAL HERNIA WITHOUT OBSTRUCTION OR GANGRENE: Chronic | ICD-10-CM

## 2020-12-26 DIAGNOSIS — Z90.49 ACQUIRED ABSENCE OF OTHER SPECIFIED PARTS OF DIGESTIVE TRACT: Chronic | ICD-10-CM

## 2020-12-26 DIAGNOSIS — Z95.5 PRESENCE OF CORONARY ANGIOPLASTY IMPLANT AND GRAFT: Chronic | ICD-10-CM

## 2020-12-26 DIAGNOSIS — Z98.890 OTHER SPECIFIED POSTPROCEDURAL STATES: Chronic | ICD-10-CM

## 2020-12-26 LAB — SARS-COV-2 RNA SPEC QL NAA+PROBE: SIGNIFICANT CHANGE UP

## 2020-12-27 NOTE — PRE-ANESTHESIA EVALUATION ADULT - NSRADCARDRESULTSFT_GEN_ALL_CORE
CONCLUSIONS:  Technically difficult study.  1. Mitral annular calcification, otherwise normal mitral  valve. Mild mitral regurgitation.  2. Aortic valve leaflet morphology not well visualized.  Mild aortic regurgitation.  3. Normal left ventricular internal dimensions and wall  thicknesses.  4. Endocardium not well visualized; grossly severe global  left ventricular systolic dysfunction.  Endocardial  visualization enhanced withintravenous injection of echo  contrast (Definity).  5. The right ventricle is not well visualized; grossly  normal right ventricular systolic function.  *** Compared with echocardiogram of 12/5/2018, left  ventricular systolic function has deteriorated.    < end of copied text >    < from: Cardiac Cath Lab - Adult (12.08.20 @ 09:31) >        < end of copied text >

## 2020-12-27 NOTE — PRE-ANESTHESIA EVALUATION ADULT - NSANTHPMHFT_GEN_ALL_CORE
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 When presenting for cath pt noted to have 2nd degree type 1 heart block as as well as transient 2:1 AVB, hemodynamically stable during these episodes and asymptomatic. Evaluated by EP, need for permanent pacemaker.     CXR clear  Echocardiogram showed a new decline in LVEF to 29% from 38%.  Cath: mid LAD 70%, S1 80%, OM1 70%

## 2020-12-28 ENCOUNTER — INPATIENT (INPATIENT)
Facility: HOSPITAL | Age: 75
LOS: 7 days | Discharge: HOME | DRG: 219 | End: 2021-01-05
Attending: THORACIC SURGERY (CARDIOTHORACIC VASCULAR SURGERY) | Admitting: THORACIC SURGERY (CARDIOTHORACIC VASCULAR SURGERY)
Payer: MEDICARE

## 2020-12-28 ENCOUNTER — APPOINTMENT (OUTPATIENT)
Dept: CARDIOTHORACIC SURGERY | Facility: HOSPITAL | Age: 75
End: 2020-12-28

## 2020-12-28 ENCOUNTER — RESULT REVIEW (OUTPATIENT)
Age: 75
End: 2020-12-28

## 2020-12-28 VITALS
HEART RATE: 71 BPM | HEIGHT: 73 IN | WEIGHT: 259.04 LBS | TEMPERATURE: 98 F | RESPIRATION RATE: 18 BRPM | DIASTOLIC BLOOD PRESSURE: 89 MMHG | SYSTOLIC BLOOD PRESSURE: 145 MMHG | OXYGEN SATURATION: 97 %

## 2020-12-28 DIAGNOSIS — Z90.49 ACQUIRED ABSENCE OF OTHER SPECIFIED PARTS OF DIGESTIVE TRACT: Chronic | ICD-10-CM

## 2020-12-28 DIAGNOSIS — Z98.890 OTHER SPECIFIED POSTPROCEDURAL STATES: Chronic | ICD-10-CM

## 2020-12-28 DIAGNOSIS — K43.2 INCISIONAL HERNIA WITHOUT OBSTRUCTION OR GANGRENE: Chronic | ICD-10-CM

## 2020-12-28 DIAGNOSIS — Z95.5 PRESENCE OF CORONARY ANGIOPLASTY IMPLANT AND GRAFT: Chronic | ICD-10-CM

## 2020-12-28 DIAGNOSIS — I25.10 ATHEROSCLEROTIC HEART DISEASE OF NATIVE CORONARY ARTERY WITHOUT ANGINA PECTORIS: ICD-10-CM

## 2020-12-28 LAB
ALBUMIN SERPL ELPH-MCNC: 3.6 G/DL — SIGNIFICANT CHANGE UP (ref 3.3–5)
ALP SERPL-CCNC: 41 U/L — SIGNIFICANT CHANGE UP (ref 40–120)
ALT FLD-CCNC: 31 U/L — SIGNIFICANT CHANGE UP (ref 10–45)
ANION GAP SERPL CALC-SCNC: 16 MMOL/L — SIGNIFICANT CHANGE UP (ref 5–17)
APTT BLD: 26.2 SEC — LOW (ref 27.5–35.5)
AST SERPL-CCNC: 82 U/L — HIGH (ref 10–40)
BASE EXCESS BLDMV CALC-SCNC: -2.2 MMOL/L — SIGNIFICANT CHANGE UP (ref -3–3)
BASE EXCESS BLDMV CALC-SCNC: -4.2 MMOL/L — LOW (ref -3–3)
BASE EXCESS BLDMV CALC-SCNC: -4.8 MMOL/L — LOW (ref -3–3)
BASE EXCESS BLDV CALC-SCNC: -0.6 MMOL/L — SIGNIFICANT CHANGE UP (ref -2–2)
BASE EXCESS BLDV CALC-SCNC: -0.8 MMOL/L — SIGNIFICANT CHANGE UP (ref -2–2)
BASE EXCESS BLDV CALC-SCNC: -1.1 MMOL/L — SIGNIFICANT CHANGE UP (ref -2–2)
BASE EXCESS BLDV CALC-SCNC: -2.4 MMOL/L — LOW (ref -2–2)
BASE EXCESS BLDV CALC-SCNC: -3.6 MMOL/L — LOW (ref -2–2)
BASE EXCESS BLDV CALC-SCNC: -5.2 MMOL/L — LOW (ref -2–2)
BASOPHILS # BLD AUTO: 0.03 K/UL — SIGNIFICANT CHANGE UP (ref 0–0.2)
BASOPHILS NFR BLD AUTO: 0.2 % — SIGNIFICANT CHANGE UP (ref 0–2)
BILIRUB SERPL-MCNC: 1.6 MG/DL — HIGH (ref 0.2–1.2)
BLOOD GAS VENOUS - CREATININE: SIGNIFICANT CHANGE UP MG/DL (ref 0.5–1.3)
BUN SERPL-MCNC: 15 MG/DL — SIGNIFICANT CHANGE UP (ref 7–23)
CA-I SERPL-SCNC: 0.94 MMOL/L — LOW (ref 1.12–1.3)
CA-I SERPL-SCNC: 0.95 MMOL/L — LOW (ref 1.12–1.3)
CA-I SERPL-SCNC: 0.97 MMOL/L — LOW (ref 1.12–1.3)
CA-I SERPL-SCNC: 0.99 MMOL/L — LOW (ref 1.12–1.3)
CA-I SERPL-SCNC: 1.02 MMOL/L — LOW (ref 1.12–1.3)
CA-I SERPL-SCNC: 1.02 MMOL/L — LOW (ref 1.12–1.3)
CA-I SERPL-SCNC: 1.08 MMOL/L — LOW (ref 1.12–1.3)
CA-I SERPL-SCNC: 1.11 MMOL/L — LOW (ref 1.12–1.3)
CALCIUM SERPL-MCNC: 8.7 MG/DL — SIGNIFICANT CHANGE UP (ref 8.4–10.5)
CHLORIDE BLDV-SCNC: SIGNIFICANT CHANGE UP MMOL/L (ref 96–108)
CHLORIDE SERPL-SCNC: 105 MMOL/L — SIGNIFICANT CHANGE UP (ref 96–108)
CK MB BLD-MCNC: 15.6 % — HIGH (ref 0–3.5)
CK MB CFR SERPL CALC: 89.3 NG/ML — HIGH (ref 0–6.7)
CK SERPL-CCNC: 574 U/L — HIGH (ref 30–200)
CO2 BLDMV-SCNC: 23 MMOL/L — SIGNIFICANT CHANGE UP (ref 21–29)
CO2 BLDMV-SCNC: 23 MMOL/L — SIGNIFICANT CHANGE UP (ref 21–29)
CO2 BLDMV-SCNC: 27 MMOL/L — SIGNIFICANT CHANGE UP (ref 21–29)
CO2 SERPL-SCNC: 21 MMOL/L — LOW (ref 22–31)
CREAT SERPL-MCNC: 0.68 MG/DL — SIGNIFICANT CHANGE UP (ref 0.5–1.3)
EOSINOPHIL # BLD AUTO: 0.02 K/UL — SIGNIFICANT CHANGE UP (ref 0–0.5)
EOSINOPHIL NFR BLD AUTO: 0.1 % — SIGNIFICANT CHANGE UP (ref 0–6)
FIBRINOGEN PPP-MCNC: 296 MG/DL — SIGNIFICANT CHANGE UP (ref 290–520)
GAS PNL BLDA: SIGNIFICANT CHANGE UP
GAS PNL BLDMV: SIGNIFICANT CHANGE UP
GAS PNL BLDV: 138 MMOL/L — SIGNIFICANT CHANGE UP (ref 135–145)
GAS PNL BLDV: 138 MMOL/L — SIGNIFICANT CHANGE UP (ref 135–145)
GAS PNL BLDV: 140 MMOL/L — SIGNIFICANT CHANGE UP (ref 135–145)
GAS PNL BLDV: 140 MMOL/L — SIGNIFICANT CHANGE UP (ref 135–145)
GAS PNL BLDV: 141 MMOL/L — SIGNIFICANT CHANGE UP (ref 135–145)
GAS PNL BLDV: 141 MMOL/L — SIGNIFICANT CHANGE UP (ref 135–145)
GAS PNL BLDV: 142 MMOL/L — SIGNIFICANT CHANGE UP (ref 135–145)
GAS PNL BLDV: 142 MMOL/L — SIGNIFICANT CHANGE UP (ref 135–145)
GAS PNL BLDV: SIGNIFICANT CHANGE UP
GLUCOSE BLDC GLUCOMTR-MCNC: 126 MG/DL — HIGH (ref 70–99)
GLUCOSE BLDC GLUCOMTR-MCNC: 166 MG/DL — HIGH (ref 70–99)
GLUCOSE BLDC GLUCOMTR-MCNC: 173 MG/DL — HIGH (ref 70–99)
GLUCOSE BLDC GLUCOMTR-MCNC: 187 MG/DL — HIGH (ref 70–99)
GLUCOSE BLDC GLUCOMTR-MCNC: 201 MG/DL — HIGH (ref 70–99)
GLUCOSE BLDV-MCNC: 126 MG/DL — HIGH (ref 70–99)
GLUCOSE BLDV-MCNC: 129 MG/DL — HIGH (ref 70–99)
GLUCOSE BLDV-MCNC: 134 MG/DL — HIGH (ref 70–99)
GLUCOSE BLDV-MCNC: 148 MG/DL — HIGH (ref 70–99)
GLUCOSE BLDV-MCNC: 150 MG/DL — HIGH (ref 70–99)
GLUCOSE BLDV-MCNC: 150 MG/DL — HIGH (ref 70–99)
GLUCOSE BLDV-MCNC: 154 MG/DL — HIGH (ref 70–99)
GLUCOSE BLDV-MCNC: 166 MG/DL — HIGH (ref 70–99)
GLUCOSE SERPL-MCNC: 191 MG/DL — HIGH (ref 70–99)
HCO3 BLDMV-SCNC: 21 MMOL/L — SIGNIFICANT CHANGE UP (ref 20–28)
HCO3 BLDMV-SCNC: 21 MMOL/L — SIGNIFICANT CHANGE UP (ref 20–28)
HCO3 BLDMV-SCNC: 25 MMOL/L — SIGNIFICANT CHANGE UP (ref 20–28)
HCO3 BLDV-SCNC: 23 MMOL/L — SIGNIFICANT CHANGE UP (ref 21–29)
HCO3 BLDV-SCNC: 25 MMOL/L — SIGNIFICANT CHANGE UP (ref 21–29)
HCO3 BLDV-SCNC: 26 MMOL/L — SIGNIFICANT CHANGE UP (ref 21–29)
HCO3 BLDV-SCNC: 26 MMOL/L — SIGNIFICANT CHANGE UP (ref 21–29)
HCO3 BLDV-SCNC: 27 MMOL/L — SIGNIFICANT CHANGE UP (ref 21–29)
HCT VFR BLD CALC: 37.5 % — LOW (ref 39–50)
HCT VFR BLDA CALC: 32 % — LOW (ref 39–50)
HCT VFR BLDA CALC: 34 % — LOW (ref 39–50)
HCT VFR BLDA CALC: 34 % — LOW (ref 39–50)
HCT VFR BLDA CALC: 35 % — LOW (ref 39–50)
HCT VFR BLDA CALC: 36 % — LOW (ref 39–50)
HCT VFR BLDA CALC: 36 % — LOW (ref 39–50)
HCT VFR BLDA CALC: 37 % — LOW (ref 39–50)
HCT VFR BLDA CALC: 38 % — LOW (ref 39–50)
HEPARINASE TEG R TIME: 5.6 MIN — SIGNIFICANT CHANGE UP (ref 4.3–8.3)
HGB BLD CALC-MCNC: 10.3 G/DL — LOW (ref 13–17)
HGB BLD CALC-MCNC: 11.1 G/DL — LOW (ref 13–17)
HGB BLD CALC-MCNC: 11.2 G/DL — LOW (ref 13–17)
HGB BLD CALC-MCNC: 11.3 G/DL — LOW (ref 13–17)
HGB BLD CALC-MCNC: 11.5 G/DL — LOW (ref 13–17)
HGB BLD CALC-MCNC: 11.5 G/DL — LOW (ref 13–17)
HGB BLD CALC-MCNC: 11.9 G/DL — LOW (ref 13–17)
HGB BLD CALC-MCNC: 12.2 G/DL — LOW (ref 13–17)
HGB BLD-MCNC: 12 G/DL — LOW (ref 13–17)
HOROWITZ INDEX BLDMV+IHG-RTO: 100 — SIGNIFICANT CHANGE UP
HOROWITZ INDEX BLDV+IHG-RTO: 0 — SIGNIFICANT CHANGE UP
IMM GRANULOCYTES NFR BLD AUTO: 0.8 % — SIGNIFICANT CHANGE UP (ref 0–1.5)
INR BLD: 1.02 RATIO — SIGNIFICANT CHANGE UP (ref 0.88–1.16)
LACTATE BLDV-MCNC: 0.9 MMOL/L — SIGNIFICANT CHANGE UP (ref 0.7–2)
LACTATE BLDV-MCNC: 1.3 MMOL/L — SIGNIFICANT CHANGE UP (ref 0.7–2)
LACTATE BLDV-MCNC: 2.2 MMOL/L — HIGH (ref 0.7–2)
LACTATE BLDV-MCNC: 2.3 MMOL/L — HIGH (ref 0.7–2)
LACTATE BLDV-MCNC: 2.4 MMOL/L — HIGH (ref 0.7–2)
LACTATE BLDV-MCNC: 2.4 MMOL/L — HIGH (ref 0.7–2)
LACTATE BLDV-MCNC: 2.5 MMOL/L — HIGH (ref 0.7–2)
LACTATE BLDV-MCNC: 2.8 MMOL/L — HIGH (ref 0.7–2)
LYMPHOCYTES # BLD AUTO: 1.52 K/UL — SIGNIFICANT CHANGE UP (ref 1–3.3)
LYMPHOCYTES # BLD AUTO: 10.9 % — LOW (ref 13–44)
MAGNESIUM SERPL-MCNC: 3.1 MG/DL — HIGH (ref 1.6–2.6)
MCHC RBC-ENTMCNC: 29.4 PG — SIGNIFICANT CHANGE UP (ref 27–34)
MCHC RBC-ENTMCNC: 32 GM/DL — SIGNIFICANT CHANGE UP (ref 32–36)
MCV RBC AUTO: 91.9 FL — SIGNIFICANT CHANGE UP (ref 80–100)
MONOCYTES # BLD AUTO: 1.25 K/UL — HIGH (ref 0–0.9)
MONOCYTES NFR BLD AUTO: 8.9 % — SIGNIFICANT CHANGE UP (ref 2–14)
NEUTROPHILS # BLD AUTO: 11.05 K/UL — HIGH (ref 1.8–7.4)
NEUTROPHILS NFR BLD AUTO: 79.1 % — HIGH (ref 43–77)
NRBC # BLD: 0 /100 WBCS — SIGNIFICANT CHANGE UP (ref 0–0)
O2 CT VFR BLD CALC: 37 MMHG — SIGNIFICANT CHANGE UP (ref 30–65)
O2 CT VFR BLD CALC: 38 MMHG — SIGNIFICANT CHANGE UP (ref 30–65)
O2 CT VFR BLD CALC: 40 MMHG — SIGNIFICANT CHANGE UP (ref 30–65)
PCO2 BLDMV: 43 MMHG — SIGNIFICANT CHANGE UP (ref 30–65)
PCO2 BLDMV: 47 MMHG — SIGNIFICANT CHANGE UP (ref 30–65)
PCO2 BLDMV: 58 MMHG — SIGNIFICANT CHANGE UP (ref 30–65)
PCO2 BLDV: 48 MMHG — SIGNIFICANT CHANGE UP (ref 35–50)
PCO2 BLDV: 50 MMHG — SIGNIFICANT CHANGE UP (ref 35–50)
PCO2 BLDV: 50 MMHG — SIGNIFICANT CHANGE UP (ref 35–50)
PCO2 BLDV: 53 MMHG — HIGH (ref 35–50)
PCO2 BLDV: 54 MMHG — HIGH (ref 35–50)
PCO2 BLDV: 56 MMHG — HIGH (ref 35–50)
PCO2 BLDV: 63 MMHG — HIGH (ref 35–50)
PCO2 BLDV: 82 MMHG — HIGH (ref 35–50)
PH BLDMV: 7.26 — LOW (ref 7.32–7.45)
PH BLDMV: 7.28 — LOW (ref 7.32–7.45)
PH BLDMV: 7.32 — SIGNIFICANT CHANGE UP (ref 7.32–7.45)
PH BLDV: 7.12 — CRITICAL LOW (ref 7.35–7.45)
PH BLDV: 7.25 — LOW (ref 7.35–7.45)
PH BLDV: 7.26 — LOW (ref 7.35–7.45)
PH BLDV: 7.28 — LOW (ref 7.35–7.45)
PH BLDV: 7.3 — LOW (ref 7.35–7.45)
PH BLDV: 7.31 — LOW (ref 7.35–7.45)
PH BLDV: 7.32 — LOW (ref 7.35–7.45)
PH BLDV: 7.33 — LOW (ref 7.35–7.45)
PHOSPHATE SERPL-MCNC: 3.7 MG/DL — SIGNIFICANT CHANGE UP (ref 2.5–4.5)
PLATELET # BLD AUTO: 131 K/UL — LOW (ref 150–400)
PO2 BLDV: 113 MMHG — HIGH (ref 25–45)
PO2 BLDV: 239 MMHG — HIGH (ref 25–45)
PO2 BLDV: 54 MMHG — HIGH (ref 25–45)
PO2 BLDV: 55 MMHG — HIGH (ref 25–45)
PO2 BLDV: 56 MMHG — HIGH (ref 25–45)
PO2 BLDV: 58 MMHG — HIGH (ref 25–45)
PO2 BLDV: 73 MMHG — HIGH (ref 25–45)
PO2 BLDV: 79 MMHG — HIGH (ref 25–45)
POTASSIUM BLDV-SCNC: 3.6 MMOL/L — SIGNIFICANT CHANGE UP (ref 3.5–5)
POTASSIUM BLDV-SCNC: 3.7 MMOL/L — SIGNIFICANT CHANGE UP (ref 3.5–5)
POTASSIUM BLDV-SCNC: 3.8 MMOL/L — SIGNIFICANT CHANGE UP (ref 3.5–5)
POTASSIUM BLDV-SCNC: 3.8 MMOL/L — SIGNIFICANT CHANGE UP (ref 3.5–5)
POTASSIUM BLDV-SCNC: 4.1 MMOL/L — SIGNIFICANT CHANGE UP (ref 3.5–5)
POTASSIUM BLDV-SCNC: 4.5 MMOL/L — SIGNIFICANT CHANGE UP (ref 3.5–5)
POTASSIUM BLDV-SCNC: 4.6 MMOL/L — SIGNIFICANT CHANGE UP (ref 3.5–5)
POTASSIUM BLDV-SCNC: 4.8 MMOL/L — SIGNIFICANT CHANGE UP (ref 3.5–5)
POTASSIUM SERPL-MCNC: 3.9 MMOL/L — SIGNIFICANT CHANGE UP (ref 3.5–5.3)
POTASSIUM SERPL-SCNC: 3.9 MMOL/L — SIGNIFICANT CHANGE UP (ref 3.5–5.3)
PROT SERPL-MCNC: 4.9 G/DL — LOW (ref 6–8.3)
PROTHROM AB SERPL-ACNC: 12.2 SEC — SIGNIFICANT CHANGE UP (ref 10.6–13.6)
RAPIDTEG MAXIMUM AMPLITUDE: 55 MM — SIGNIFICANT CHANGE UP (ref 52–70)
RBC # BLD: 4.08 M/UL — LOW (ref 4.2–5.8)
RBC # FLD: 14.2 % — SIGNIFICANT CHANGE UP (ref 10.3–14.5)
SAO2 % BLDMV: 62 % — SIGNIFICANT CHANGE UP (ref 60–90)
SAO2 % BLDMV: 67 % — SIGNIFICANT CHANGE UP (ref 60–90)
SAO2 % BLDMV: 68 % — SIGNIFICANT CHANGE UP (ref 60–90)
SAO2 % BLDV: 100 % — HIGH (ref 67–88)
SAO2 % BLDV: 81 % — SIGNIFICANT CHANGE UP (ref 67–88)
SAO2 % BLDV: 87 % — SIGNIFICANT CHANGE UP (ref 67–88)
SAO2 % BLDV: 89 % — HIGH (ref 67–88)
SAO2 % BLDV: 90 % — HIGH (ref 67–88)
SAO2 % BLDV: 92 % — HIGH (ref 67–88)
SAO2 % BLDV: 94 % — HIGH (ref 67–88)
SAO2 % BLDV: 99 % — HIGH (ref 67–88)
SODIUM SERPL-SCNC: 142 MMOL/L — SIGNIFICANT CHANGE UP (ref 135–145)
TEG FUNCTIONAL FIBRINOGEN: 17.5 MM — SIGNIFICANT CHANGE UP (ref 15–32)
TEG MAXIMUM AMPLITUDE: 57.9 MM — SIGNIFICANT CHANGE UP (ref 52–69)
TEG REACTION TIME: 5.7 MIN — SIGNIFICANT CHANGE UP (ref 4.6–9.1)
TROPONIN T, HIGH SENSITIVITY RESULT: 1170 NG/L — HIGH (ref 0–51)
TSH SERPL-MCNC: 1.6 UIU/ML — SIGNIFICANT CHANGE UP (ref 0.27–4.2)
WBC # BLD: 13.98 K/UL — HIGH (ref 3.8–10.5)
WBC # FLD AUTO: 13.98 K/UL — HIGH (ref 3.8–10.5)

## 2020-12-28 PROCEDURE — 33533 CABG ARTERIAL SINGLE: CPT

## 2020-12-28 PROCEDURE — 99291 CRITICAL CARE FIRST HOUR: CPT

## 2020-12-28 PROCEDURE — 33859 AS-AORT GRF F/DS OTH/THN DSJ: CPT

## 2020-12-28 PROCEDURE — 88304 TISSUE EXAM BY PATHOLOGIST: CPT | Mod: 26

## 2020-12-28 PROCEDURE — 33508 ENDOSCOPIC VEIN HARVEST: CPT | Mod: 59

## 2020-12-28 PROCEDURE — 71045 X-RAY EXAM CHEST 1 VIEW: CPT | Mod: 26

## 2020-12-28 PROCEDURE — 33518 CABG ARTERY-VEIN TWO: CPT

## 2020-12-28 RX ORDER — POTASSIUM CHLORIDE 20 MEQ
10 PACKET (EA) ORAL
Refills: 0 | Status: DISCONTINUED | OUTPATIENT
Start: 2020-12-28 | End: 2021-01-01

## 2020-12-28 RX ORDER — POLYETHYLENE GLYCOL 3350 17 G/17G
17 POWDER, FOR SOLUTION ORAL DAILY
Refills: 0 | Status: DISCONTINUED | OUTPATIENT
Start: 2020-12-28 | End: 2021-01-03

## 2020-12-28 RX ORDER — ALLOPURINOL 300 MG
1 TABLET ORAL
Qty: 0 | Refills: 0 | DISCHARGE

## 2020-12-28 RX ORDER — CEFUROXIME AXETIL 250 MG
1500 TABLET ORAL EVERY 8 HOURS
Refills: 0 | Status: COMPLETED | OUTPATIENT
Start: 2020-12-28 | End: 2020-12-29

## 2020-12-28 RX ORDER — MAGNESIUM SULFATE 500 MG/ML
2 VIAL (ML) INJECTION ONCE
Refills: 0 | Status: COMPLETED | OUTPATIENT
Start: 2020-12-28 | End: 2020-12-28

## 2020-12-28 RX ORDER — MILRINONE LACTATE 1 MG/ML
0.12 INJECTION, SOLUTION INTRAVENOUS
Qty: 20 | Refills: 0 | Status: DISCONTINUED | OUTPATIENT
Start: 2020-12-28 | End: 2021-01-01

## 2020-12-28 RX ORDER — POTASSIUM CHLORIDE 20 MEQ
10 PACKET (EA) ORAL
Refills: 0 | Status: COMPLETED | OUTPATIENT
Start: 2020-12-28 | End: 2020-12-28

## 2020-12-28 RX ORDER — CHLORHEXIDINE GLUCONATE 213 G/1000ML
5 SOLUTION TOPICAL
Refills: 0 | Status: DISCONTINUED | OUTPATIENT
Start: 2020-12-28 | End: 2020-12-28

## 2020-12-28 RX ORDER — SODIUM CHLORIDE 9 MG/ML
1000 INJECTION INTRAMUSCULAR; INTRAVENOUS; SUBCUTANEOUS
Refills: 0 | Status: DISCONTINUED | OUTPATIENT
Start: 2020-12-28 | End: 2021-01-01

## 2020-12-28 RX ORDER — AMIODARONE HYDROCHLORIDE 400 MG/1
150 TABLET ORAL ONCE
Refills: 0 | Status: COMPLETED | OUTPATIENT
Start: 2020-12-28 | End: 2020-12-28

## 2020-12-28 RX ORDER — HYDROMORPHONE HYDROCHLORIDE 2 MG/ML
0.5 INJECTION INTRAMUSCULAR; INTRAVENOUS; SUBCUTANEOUS ONCE
Refills: 0 | Status: DISCONTINUED | OUTPATIENT
Start: 2020-12-28 | End: 2020-12-28

## 2020-12-28 RX ORDER — DEXTROSE 50 % IN WATER 50 %
25 SYRINGE (ML) INTRAVENOUS
Refills: 0 | Status: DISCONTINUED | OUTPATIENT
Start: 2020-12-28 | End: 2021-01-01

## 2020-12-28 RX ORDER — LIDOCAINE HCL 20 MG/ML
0.2 VIAL (ML) INJECTION ONCE
Refills: 0 | Status: DISCONTINUED | OUTPATIENT
Start: 2020-12-28 | End: 2020-12-28

## 2020-12-28 RX ORDER — MEPERIDINE HYDROCHLORIDE 50 MG/ML
25 INJECTION INTRAMUSCULAR; INTRAVENOUS; SUBCUTANEOUS ONCE
Refills: 0 | Status: DISCONTINUED | OUTPATIENT
Start: 2020-12-28 | End: 2020-12-30

## 2020-12-28 RX ORDER — NOREPINEPHRINE BITARTRATE/D5W 8 MG/250ML
0.1 PLASTIC BAG, INJECTION (ML) INTRAVENOUS
Qty: 8 | Refills: 0 | Status: DISCONTINUED | OUTPATIENT
Start: 2020-12-28 | End: 2020-12-30

## 2020-12-28 RX ORDER — DOBUTAMINE HCL 250MG/20ML
2.5 VIAL (ML) INTRAVENOUS
Qty: 500 | Refills: 0 | Status: DISCONTINUED | OUTPATIENT
Start: 2020-12-28 | End: 2020-12-30

## 2020-12-28 RX ORDER — VASOPRESSIN 20 [USP'U]/ML
0.04 INJECTION INTRAVENOUS
Qty: 50 | Refills: 0 | Status: DISCONTINUED | OUTPATIENT
Start: 2020-12-28 | End: 2020-12-30

## 2020-12-28 RX ORDER — CALCIUM GLUCONATE 100 MG/ML
1 VIAL (ML) INTRAVENOUS ONCE
Refills: 0 | Status: COMPLETED | OUTPATIENT
Start: 2020-12-28 | End: 2020-12-28

## 2020-12-28 RX ORDER — ACETAMINOPHEN 500 MG
1000 TABLET ORAL ONCE
Refills: 0 | Status: COMPLETED | OUTPATIENT
Start: 2020-12-28 | End: 2020-12-28

## 2020-12-28 RX ORDER — ASPIRIN/CALCIUM CARB/MAGNESIUM 324 MG
300 TABLET ORAL ONCE
Refills: 0 | Status: COMPLETED | OUTPATIENT
Start: 2020-12-28 | End: 2020-12-28

## 2020-12-28 RX ORDER — SODIUM CHLORIDE 9 MG/ML
3 INJECTION INTRAMUSCULAR; INTRAVENOUS; SUBCUTANEOUS EVERY 8 HOURS
Refills: 0 | Status: DISCONTINUED | OUTPATIENT
Start: 2020-12-28 | End: 2020-12-28

## 2020-12-28 RX ORDER — CHLORHEXIDINE GLUCONATE 213 G/1000ML
1 SOLUTION TOPICAL ONCE
Refills: 0 | Status: DISCONTINUED | OUTPATIENT
Start: 2020-12-28 | End: 2020-12-28

## 2020-12-28 RX ORDER — ALBUMIN HUMAN 25 %
250 VIAL (ML) INTRAVENOUS
Refills: 0 | Status: COMPLETED | OUTPATIENT
Start: 2020-12-28 | End: 2020-12-28

## 2020-12-28 RX ORDER — CHLORHEXIDINE GLUCONATE 213 G/1000ML
15 SOLUTION TOPICAL EVERY 12 HOURS
Refills: 0 | Status: DISCONTINUED | OUTPATIENT
Start: 2020-12-28 | End: 2020-12-29

## 2020-12-28 RX ORDER — DEXTROSE 50 % IN WATER 50 %
50 SYRINGE (ML) INTRAVENOUS
Refills: 0 | Status: DISCONTINUED | OUTPATIENT
Start: 2020-12-28 | End: 2021-01-01

## 2020-12-28 RX ORDER — ASPIRIN/CALCIUM CARB/MAGNESIUM 324 MG
300 TABLET ORAL ONCE
Refills: 0 | Status: COMPLETED | OUTPATIENT
Start: 2020-12-28

## 2020-12-28 RX ORDER — INSULIN HUMAN 100 [IU]/ML
3 INJECTION, SOLUTION SUBCUTANEOUS
Qty: 100 | Refills: 0 | Status: DISCONTINUED | OUTPATIENT
Start: 2020-12-28 | End: 2020-12-31

## 2020-12-28 RX ORDER — CHLORHEXIDINE GLUCONATE 213 G/1000ML
1 SOLUTION TOPICAL
Refills: 0 | Status: DISCONTINUED | OUTPATIENT
Start: 2020-12-28 | End: 2021-01-01

## 2020-12-28 RX ORDER — PANTOPRAZOLE SODIUM 20 MG/1
40 TABLET, DELAYED RELEASE ORAL DAILY
Refills: 0 | Status: DISCONTINUED | OUTPATIENT
Start: 2020-12-28 | End: 2020-12-31

## 2020-12-28 RX ADMIN — Medication 100 MILLIGRAM(S): at 22:06

## 2020-12-28 RX ADMIN — Medication 24.7 MICROGRAM(S)/KG/MIN: at 22:46

## 2020-12-28 RX ADMIN — Medication 100 MILLIEQUIVALENT(S): at 21:25

## 2020-12-28 RX ADMIN — Medication 100 MILLIGRAM(S): at 17:07

## 2020-12-28 RX ADMIN — Medication 100 GRAM(S): at 17:44

## 2020-12-28 RX ADMIN — Medication 100 MILLIEQUIVALENT(S): at 17:26

## 2020-12-28 RX ADMIN — CHLORHEXIDINE GLUCONATE 15 MILLILITER(S): 213 SOLUTION TOPICAL at 18:07

## 2020-12-28 RX ADMIN — PANTOPRAZOLE SODIUM 40 MILLIGRAM(S): 20 TABLET, DELAYED RELEASE ORAL at 18:07

## 2020-12-28 RX ADMIN — Medication 100 MILLIEQUIVALENT(S): at 21:03

## 2020-12-28 RX ADMIN — Medication 125 MILLILITER(S): at 20:45

## 2020-12-28 RX ADMIN — Medication 100 MILLIEQUIVALENT(S): at 18:19

## 2020-12-28 RX ADMIN — Medication 400 MILLIGRAM(S): at 18:06

## 2020-12-28 RX ADMIN — Medication 100 MILLIEQUIVALENT(S): at 22:45

## 2020-12-28 RX ADMIN — Medication 100 MILLIEQUIVALENT(S): at 19:00

## 2020-12-28 RX ADMIN — HYDROMORPHONE HYDROCHLORIDE 0.5 MILLIGRAM(S): 2 INJECTION INTRAMUSCULAR; INTRAVENOUS; SUBCUTANEOUS at 20:00

## 2020-12-28 RX ADMIN — MILRINONE LACTATE 13.2 MICROGRAM(S)/KG/MIN: 1 INJECTION, SOLUTION INTRAVENOUS at 21:36

## 2020-12-28 RX ADMIN — Medication 125 MILLILITER(S): at 16:30

## 2020-12-28 RX ADMIN — Medication 125 MILLILITER(S): at 16:40

## 2020-12-28 RX ADMIN — Medication 300 MILLIGRAM(S): at 22:19

## 2020-12-28 RX ADMIN — Medication 50 GRAM(S): at 20:11

## 2020-12-28 RX ADMIN — AMIODARONE HYDROCHLORIDE 600 MILLIGRAM(S): 400 TABLET ORAL at 20:25

## 2020-12-28 RX ADMIN — Medication 100 MILLIEQUIVALENT(S): at 18:35

## 2020-12-28 RX ADMIN — Medication 100 MILLIEQUIVALENT(S): at 17:29

## 2020-12-28 RX ADMIN — Medication 125 MILLILITER(S): at 16:50

## 2020-12-28 RX ADMIN — INSULIN HUMAN 3 UNIT(S)/HR: 100 INJECTION, SOLUTION SUBCUTANEOUS at 21:36

## 2020-12-28 RX ADMIN — HYDROMORPHONE HYDROCHLORIDE 0.5 MILLIGRAM(S): 2 INJECTION INTRAMUSCULAR; INTRAVENOUS; SUBCUTANEOUS at 19:45

## 2020-12-28 RX ADMIN — Medication 1000 MILLIGRAM(S): at 18:30

## 2020-12-28 RX ADMIN — Medication 100 MILLIEQUIVALENT(S): at 21:45

## 2020-12-28 NOTE — BRIEF OPERATIVE NOTE - COMMENTS
Cross Clamp Time 137.  Circ arrest to 18 degrees, with antegrade and retrograde cerebral perfusion. Right GSV harvest w/Pete Alberto (MARITZA)

## 2020-12-28 NOTE — BRIEF OPERATIVE NOTE - NSICDXBRIEFPREOP_GEN_ALL_CORE_FT
PRE-OP DIAGNOSIS:  Aneurysm, ascending aorta 28-Dec-2020 16:16:20  Saúl Orozco  Coronary artery disease 28-Dec-2020 16:16:07  Saúl Orozco

## 2020-12-28 NOTE — BRIEF OPERATIVE NOTE - NSICDXBRIEFPOSTOP_GEN_ALL_CORE_FT
POST-OP DIAGNOSIS:  Aneurysm, ascending aorta 28-Dec-2020 16:16:53  Saúl Orozco  Coronary artery disease 28-Dec-2020 16:16:31  Saúl Orozco

## 2020-12-28 NOTE — BRIEF OPERATIVE NOTE - NSICDXBRIEFPROCEDURE_GEN_ALL_CORE_FT
PROCEDURES:  CABG, 1 arterial and 2 venous 28-Dec-2020 16:15:43  Saúl Orozco  Replacement, ascending aorta and hemiarch 28-Dec-2020 16:15:16  Saúl Orozco

## 2020-12-28 NOTE — PROGRESS NOTE ADULT - SUBJECTIVE AND OBJECTIVE BOX
CHARLINE LAFLEUR  MRN-[id number]  Patient is a 75y old  Male who presents with a chief complaint of "Here for a bypass and possibly other things" (21 Dec 2020 10:15)    HPI:  76 y/o M w/ PMH of HTN, HLD, CAD s/p stent in 2010, Colon CA s/p resection + radiation + ileostomy placement and reversal, and an Aortic Ascending aneurysm 4.7cm presents to PST for a CABG x3 and possible Ascending Aortic Aneurysm Repair on 12/28/20. Echocardiogram showed a new decline in LVEF to 29% from 38%. Denies N/V/D, fevers, chills, cough, palpitations, chest pain, syncope, orthopnea, nocturnal paroxysmal dyspnea, edema, cyanosis, heart murmurs, varicosities, phlebitis, and claudication. COVID testing scheduled at [facility] on 12/26/2020.  (21 Dec 2020 10:15)      Surgery/Hospital course:  12/28 S/P CABG x3. Ascending aorta replacement for ascending aorta aneurysm.     Today/Overnight:  Patient with history of multivessel coronary artery disease, subsequently underwent coronary artery bypass grafting x3 procedure earlier today. Patient returned intubated from the operating room to the cardiothoracic intensive care unit for recuperation and close monitoring.    Vital Signs Last 24 Hrs  T(C): 35.5 (28 Dec 2020 17:00), Max: 36.7 (28 Dec 2020 05:43)  T(F): 95.9 (28 Dec 2020 17:00), Max: 98.1 (28 Dec 2020 05:43)  HR: 87 (28 Dec 2020 19:15) (71 - 87)  BP: 145/89 (28 Dec 2020 05:43) (145/89 - 145/89)  BP(mean): --  RR: 20 (28 Dec 2020 19:15) (14 - 22)  SpO2: 100% (28 Dec 2020 19:15) (94% - 100%)  ============================I/O===========================  I&O's Detail    28 Dec 2020 07:01  -  28 Dec 2020 19:34  --------------------------------------------------------  Total IN: 1406.9 mL / Total OUT: 385 mL / Total NET: 1021.9 mL      ============================ LABS =========================                        12.0   13.98 )-----------( 131      ( 28 Dec 2020 16:34 )             37.5     12-28    142  |  105  |  15  ----------------------------<  191<H>  3.9   |  21<L>  |  0.68    Ca    8.7      28 Dec 2020 16:38  Phos  3.7     12-28  Mg     3.1     12-28    TPro  4.9<L>  /  Alb  3.6  /  TBili  1.6<H>  /  DBili  x   /  AST  82<H>  /  ALT  31  /  AlkPhos  41  12-28    LIVER FUNCTIONS - ( 28 Dec 2020 16:38 )  Alb: 3.6 g/dL / Pro: 4.9 g/dL / ALK PHOS: 41 U/L / ALT: 31 U/L / AST: 82 U/L / GGT: x           PT/INR - ( 28 Dec 2020 16:34 )   PT: 12.2 sec;   INR: 1.02 ratio         PTT - ( 28 Dec 2020 16:34 )  PTT:26.2 sec  ABG - ( 28 Dec 2020 17:56 )  pH, Arterial: 7.32  pH, Blood: x     /  pCO2: 39    /  pO2: 105   / HCO3: 19    / Base Excess: -5.9  /  SaO2: 98          ======================Micro/Rad/Cardio=================  Telemetry: Reviewed   CXR: Reviewed  Echo: Reviewed  ======================================================  PAST MEDICAL & SURGICAL HISTORY:  Ascending aortic aneurysm  4.7cm    ASHD (arteriosclerotic heart disease)    HFrEF (heart failure with reduced ejection fraction)    Gout    HLD (hyperlipidemia)    Colon carcinoma  Sp Ileostomy and reverasl with chemo/rt (2012)    CAD (coronary artery disease)  SP ptca w stent 5/2010    HTN (Hypertension)    Incisional hernia  2013    History of reversal of ileostomy  2012    S/P coronary artery stent placement  2010 w/ ileostomy    S/P colon resection      ========================ASSESSMENT ================  CAD S/P CABG x3 on 12/28  Ascending aorta aneurysm S/P Ascending aorta replacement on 12/28  Stress Hyperglycemia  Leukocytosis    Plan:  ====================== NEUROLOGY=====================  Addressed analgesic regimen to optimize function. Off sedation, continue to assess neurological status per ICU protocol.    ==================== RESPIRATORY======================  On full vent support, requiring close monitoring of respiratory rate, breathing pattern, pulse oximetry monitoring, and intermittent blood gas analysis. Will wean once patient is more stable.     Mechanical Ventilation:  Mode: AC/ CMV (Assist Control/ Continuous Mandatory Ventilation)  RR (machine): 18  TV (machine): 750  FiO2: 100  PEEP: 7  ITime: 1.3  MAP: 12  PIP: 24      ====================CARDIOVASCULAR==================  Patient with history of multivessel coronary artery disease, subsequently underwent coronary artery bypass grafting x3 procedure earlier today. Requiring inotropic support with IV Dobutamine and IV Primacor infusions along with vasopressor support with IV Levophed and IV Vasopressin infusions to maintain mean arterial pressure of more than 65. Invasive hemodynamic monitoring with a Central line & a Radial A-line were required for the following of serial CI's/MV02's and continuous central venous and MAP/BP monitoring to ensure adequate cardiovascular support.     ===================HEMATOLOGIC/ONC ===================  Hematocrit of 35%. Monitor hemoglobin and hematocrit levels along with platelet levels. Monitor chest tube outputs.     ===================== RENAL =========================  Optimize renal perfusion with adequate volume resuscitation and continued monitoring of urine output, fluid balance, BUN/Creatinine.     ==================== GASTROINTESTINAL===================  NPO after recent procedure, advance diet as tolerated. Continue IVP Protonix for stress ulcer prophylaxis. Bowel regimen with Miralax.     =======================    ENDOCRINE  =====================  Metabolic instability, stress hyperglycemia, requiring glucose control with insulin gtt, titrate as per insulin drip protocol along with hourly glucose checks.     ========================INFECTIOUS DISEASE================  Continue Cefuroxime for perioperative antibiotic coverage. Leukocytosis with WBC 13.98. Monitor leukocytosis, for fever, along with signs and symptoms of an active infection.      Patient requires continuous monitoring with bedside rhythm monitoring, pulse oximetry monitoring, ventilator management, and continuous central venous and arterial pressure monitoring; and intermittent blood gas analysis.  Care plan discussed with ICU care team.    Patient remained critical, at risk for life threatening decompensation.   I have spent 35 minutes providing acute care with multiple re-evaluations throughout the evening.     By signing my name below, I, Nazanin Harvey, attest that this documentation has been prepared under the direction and in the presence of Anne Nielson MD.  Electronically signed: Aly Nelson, 12-28-20 @ 19:34    I, Anne Nielson MD, personally performed the services described in this documentation. All medical record entries made by the scribe were at my direction and in my presence. I have reviewed the chart and agree that the record reflects my personal performance and is accurate and complete  Electronically signed: Anne Nielson MD, 12-28-20 @ 19:34       CHARLINE LAFLEUR  MRN-[id number]  Patient is a 75y old  Male who presents with a chief complaint of "Here for a bypass and possibly other things" (21 Dec 2020 10:15)    HPI:  76 y/o M w/ PMH of HTN, HLD, CAD s/p stent in 2010, Colon CA s/p resection + radiation + ileostomy placement and reversal, and an Aortic Ascending aneurysm 4.7cm presents to PST for a CABG x3 and possible Ascending Aortic Aneurysm Repair on 12/28/20. Echocardiogram showed a new decline in LVEF to 29% from 38%. Denies N/V/D, fevers, chills, cough, palpitations, chest pain, syncope, orthopnea, nocturnal paroxysmal dyspnea, edema, cyanosis, heart murmurs, varicosities, phlebitis, and claudication. COVID testing scheduled at [facility] on 12/26/2020.  (21 Dec 2020 10:15)      Surgery/Hospital course:  12/28 S/P CABG x3. Ascending aorta replacement for ascending aorta aneurysm.     Today/Overnight:  Patient with history of multivessel coronary artery disease, subsequently underwent coronary artery bypass grafting x3 procedure earlier today. Patient returned intubated from the operating room to the cardiothoracic intensive care unit for recuperation and close monitoring.    Vital Signs Last 24 Hrs  T(C): 35.5 (28 Dec 2020 17:00), Max: 36.7 (28 Dec 2020 05:43)  T(F): 95.9 (28 Dec 2020 17:00), Max: 98.1 (28 Dec 2020 05:43)  HR: 87 (28 Dec 2020 19:15) (71 - 87)  BP: 145/89 (28 Dec 2020 05:43) (145/89 - 145/89)  BP(mean): --  RR: 20 (28 Dec 2020 19:15) (14 - 22)  SpO2: 100% (28 Dec 2020 19:15) (94% - 100%)  ============================I/O===========================  I&O's Detail    28 Dec 2020 07:01  -  28 Dec 2020 19:34  --------------------------------------------------------  Total IN: 1406.9 mL / Total OUT: 385 mL / Total NET: 1021.9 mL      ============================ LABS =========================                        12.0   13.98 )-----------( 131      ( 28 Dec 2020 16:34 )             37.5     12-28    142  |  105  |  15  ----------------------------<  191<H>  3.9   |  21<L>  |  0.68    Ca    8.7      28 Dec 2020 16:38  Phos  3.7     12-28  Mg     3.1     12-28    TPro  4.9<L>  /  Alb  3.6  /  TBili  1.6<H>  /  DBili  x   /  AST  82<H>  /  ALT  31  /  AlkPhos  41  12-28    LIVER FUNCTIONS - ( 28 Dec 2020 16:38 )  Alb: 3.6 g/dL / Pro: 4.9 g/dL / ALK PHOS: 41 U/L / ALT: 31 U/L / AST: 82 U/L / GGT: x           PT/INR - ( 28 Dec 2020 16:34 )   PT: 12.2 sec;   INR: 1.02 ratio         PTT - ( 28 Dec 2020 16:34 )  PTT:26.2 sec  ABG - ( 28 Dec 2020 17:56 )  pH, Arterial: 7.32  pH, Blood: x     /  pCO2: 39    /  pO2: 105   / HCO3: 19    / Base Excess: -5.9  /  SaO2: 98          ======================Micro/Rad/Cardio=================  Telemetry: Reviewed   CXR: Reviewed  Echo: Reviewed  ======================================================  PAST MEDICAL & SURGICAL HISTORY:  Ascending aortic aneurysm  4.7cm    ASHD (arteriosclerotic heart disease)    HFrEF (heart failure with reduced ejection fraction)    Gout    HLD (hyperlipidemia)    Colon carcinoma  Sp Ileostomy and reverasl with chemo/rt (2012)    CAD (coronary artery disease)  SP ptca w stent 5/2010    HTN (Hypertension)    Incisional hernia  2013    History of reversal of ileostomy  2012    S/P coronary artery stent placement  2010 w/ ileostomy    S/P colon resection      ========================ASSESSMENT ================  CAD S/P CABG x3 on 12/28  Ascending aorta aneurysm S/P Ascending aorta replacement on 12/28  Stress Hyperglycemia  Leukocytosis  Obesity OHS / SUSAN     Plan:  ====================== NEUROLOGY=====================  Addressed analgesic regimen to optimize function. Off sedation, continue to assess neurological status per ICU protocol.    ==================== RESPIRATORY======================  On full vent support, requiring close monitoring of respiratory rate, breathing pattern, pulse oximetry monitoring, and intermittent blood gas analysis. Will wean once patient is more stable.     Mechanical Ventilation:  Mode: AC/ CMV (Assist Control/ Continuous Mandatory Ventilation)  RR (machine): 18  TV (machine): 750  FiO2: 100  PEEP: 7  ITime: 1.3  MAP: 12  PIP: 24      ====================CARDIOVASCULAR==================  Patient with history of multivessel coronary artery disease, subsequently underwent coronary artery bypass grafting x3 procedure earlier today. Requiring inotropic support with IV Dobutamine and IV Primacor infusions along with vasopressor support with IV Levophed and IV Vasopressin infusions to maintain mean arterial pressure of more than 65. Invasive hemodynamic monitoring with a Central line & a Radial A-line were required for the following of serial CI's/MV02's and continuous central venous and MAP/BP monitoring to ensure adequate cardiovascular support.     ===================HEMATOLOGIC/ONC ===================  Hematocrit of 35%. Monitor hemoglobin and hematocrit levels along with platelet levels. Monitor chest tube outputs.     ===================== RENAL =========================  Optimize renal perfusion with adequate volume resuscitation and continued monitoring of urine output, fluid balance, BUN/Creatinine.     ==================== GASTROINTESTINAL===================  NPO after recent procedure, advance diet as tolerated. Continue IVP Protonix for stress ulcer prophylaxis. Bowel regimen with Miralax.     =======================    ENDOCRINE  =====================  Metabolic instability, stress hyperglycemia, requiring glucose control with insulin gtt, titrate as per insulin drip protocol along with hourly glucose checks.     ========================INFECTIOUS DISEASE================  Continue Cefuroxime for perioperative antibiotic coverage. Leukocytosis with WBC 13.98. Monitor leukocytosis, for fever, along with signs and symptoms of an active infection.      Patient requires continuous monitoring with bedside rhythm monitoring, pulse oximetry monitoring, ventilator management, and continuous central venous and arterial pressure monitoring; and intermittent blood gas analysis.  Care plan discussed with ICU care team.    Patient remained critical, at risk for life threatening decompensation.   I have spent 35 minutes providing acute care with multiple re-evaluations throughout the evening.     By signing my name below, I, Nazanin Harvey, attest that this documentation has been prepared under the direction and in the presence of Anne Nielson MD.  Electronically signed: Aly Nelson, 12-28-20 @ 19:34    I, Anne Nielson MD, personally performed the services described in this documentation. All medical record entries made by the scribe were at my direction and in my presence. I have reviewed the chart and agree that the record reflects my personal performance and is accurate and complete  Electronically signed: Anne Nielson MD, 12-28-20 @ 19:34       CHARLINE LAFELUR  MRN-[id number]  Patient is a 75y old  Male who presents with a chief complaint of "Here for a bypass and possibly other things" (21 Dec 2020 10:15)    HPI:  74 y/o M w/ PMH of HTN, HLD, CAD s/p stent in 2010, Colon CA s/p resection + radiation + ileostomy placement and reversal, and an Aortic Ascending aneurysm 4.7cm presents to PST for a CABG x3 and possible Ascending Aortic Aneurysm Repair on 12/28/20. Echocardiogram showed a new decline in LVEF to 29% from 38%. Denies N/V/D, fevers, chills, cough, palpitations, chest pain, syncope, orthopnea, nocturnal paroxysmal dyspnea, edema, cyanosis, heart murmurs, varicosities, phlebitis, and claudication. COVID testing scheduled at [facility] on 12/26/2020.  (21 Dec 2020 10:15)      Surgery/Hospital course:  12/28 S/P CABG x3. Ascending aorta replacement for ascending aorta aneurysm.     Today/Overnight:  Patient with history of multivessel coronary artery disease, subsequently underwent coronary artery bypass grafting x3 procedure earlier today. Patient returned intubated from the operating room to the cardiothoracic intensive care unit for recuperation and close monitoring.    Vital Signs Last 24 Hrs  T(C): 35.5 (28 Dec 2020 17:00), Max: 36.7 (28 Dec 2020 05:43)  T(F): 95.9 (28 Dec 2020 17:00), Max: 98.1 (28 Dec 2020 05:43)  HR: 87 (28 Dec 2020 19:15) (71 - 87)  BP: 145/89 (28 Dec 2020 05:43) (145/89 - 145/89)  BP(mean): --  RR: 20 (28 Dec 2020 19:15) (14 - 22)  SpO2: 100% (28 Dec 2020 19:15) (94% - 100%)  ============================I/O===========================  I&O's Detail    28 Dec 2020 07:01  -  28 Dec 2020 19:34  --------------------------------------------------------  Total IN: 1406.9 mL / Total OUT: 385 mL / Total NET: 1021.9 mL      ============================ LABS =========================                        12.0   13.98 )-----------( 131      ( 28 Dec 2020 16:34 )             37.5     12-28    142  |  105  |  15  ----------------------------<  191<H>  3.9   |  21<L>  |  0.68    Ca    8.7      28 Dec 2020 16:38  Phos  3.7     12-28  Mg     3.1     12-28    TPro  4.9<L>  /  Alb  3.6  /  TBili  1.6<H>  /  DBili  x   /  AST  82<H>  /  ALT  31  /  AlkPhos  41  12-28    LIVER FUNCTIONS - ( 28 Dec 2020 16:38 )  Alb: 3.6 g/dL / Pro: 4.9 g/dL / ALK PHOS: 41 U/L / ALT: 31 U/L / AST: 82 U/L / GGT: x           PT/INR - ( 28 Dec 2020 16:34 )   PT: 12.2 sec;   INR: 1.02 ratio         PTT - ( 28 Dec 2020 16:34 )  PTT:26.2 sec  ABG - ( 28 Dec 2020 17:56 )  pH, Arterial: 7.32  pH, Blood: x     /  pCO2: 39    /  pO2: 105   / HCO3: 19    / Base Excess: -5.9  /  SaO2: 98          ======================Micro/Rad/Cardio=================  Telemetry: Reviewed   CXR: Reviewed  Echo: Reviewed  ======================================================  PAST MEDICAL & SURGICAL HISTORY:  Ascending aortic aneurysm  4.7cm    ASHD (arteriosclerotic heart disease)    HFrEF (heart failure with reduced ejection fraction)    Gout    HLD (hyperlipidemia)    Colon carcinoma  Sp Ileostomy and reverasl with chemo/rt (2012)    CAD (coronary artery disease)  SP ptca w stent 5/2010    HTN (Hypertension)    Incisional hernia  2013    History of reversal of ileostomy  2012    S/P coronary artery stent placement  2010 w/ ileostomy    S/P colon resection      ========================ASSESSMENT ================  CAD S/P CABG x3 on 12/28  Ascending aorta aneurysm S/P Ascending aorta replacement on 12/28  Stress Hyperglycemia  Leukocytosis  Obesity OHS / SUSAN     Plan:  ====================== NEUROLOGY=====================  Addressed analgesic regimen to optimize function. Off sedation, continue to assess neurological status per ICU protocol.    ==================== RESPIRATORY======================  On full vent support, requiring close monitoring of respiratory rate, breathing pattern, pulse oximetry monitoring, and intermittent blood gas analysis. Will wean once patient is more stable. Increased concern for atelectasis due to obesity related restrictive lung disease.     Mechanical Ventilation:  Mode: AC/ CMV (Assist Control/ Continuous Mandatory Ventilation)  RR (machine): 18  TV (machine): 750  FiO2: 100  PEEP: 7  ITime: 1.3  MAP: 12  PIP: 24      ====================CARDIOVASCULAR==================  Patient with history of multivessel coronary artery disease, subsequently underwent coronary artery bypass grafting x3 procedure earlier today. Requiring inotropic support with IV Dobutamine and IV Primacor infusions along with vasopressor support with IV Levophed and IV Vasopressin infusions to maintain mean arterial pressure of more than 65. Invasive hemodynamic monitoring with a Central line & a Radial A-line were required for the following of serial CI's/MV02's and continuous central venous and MAP/BP monitoring to ensure adequate cardiovascular support.     ===================HEMATOLOGIC/ONC ===================  Hematocrit of 35%. Monitor hemoglobin and hematocrit levels along with platelet levels. Monitor chest tube outputs.     ===================== RENAL =========================  Optimize renal perfusion with adequate volume resuscitation and continued monitoring of urine output, fluid balance, BUN/Creatinine.     ==================== GASTROINTESTINAL===================  NPO after recent procedure, advance diet as tolerated. Continue IVP Protonix for stress ulcer prophylaxis. Bowel regimen with Miralax.     =======================    ENDOCRINE  =====================  Metabolic instability, stress hyperglycemia, requiring glucose control with insulin gtt, titrate as per insulin drip protocol along with hourly glucose checks.     ========================INFECTIOUS DISEASE================  Continue Cefuroxime for perioperative antibiotic coverage. Leukocytosis with WBC 13.98. Monitor leukocytosis, for fever, along with signs and symptoms of an active infection.      Patient requires continuous monitoring with bedside rhythm monitoring, pulse oximetry monitoring, ventilator management, and continuous central venous and arterial pressure monitoring; and intermittent blood gas analysis.  Care plan discussed with ICU care team.    Patient remained critical, at risk for life threatening decompensation.   I have spent 35 minutes providing acute care with multiple re-evaluations throughout the evening.     By signing my name below, I, Nazanin Harvey, attest that this documentation has been prepared under the direction and in the presence of Anne Nielson MD.  Electronically signed: Aly Nelson, 12-28-20 @ 19:34    I, Anne Nielson MD, personally performed the services described in this documentation. All medical record entries made by the shaynaibdebbie were at my direction and in my presence. I have reviewed the chart and agree that the record reflects my personal performance and is accurate and complete  Electronically signed: Anne Nielson MD, 12-28-20 @ 19:34       CHARLINE LAFLEUR  MRN-[id number]  Patient is a 75y old  Male who presents with a chief complaint of "Here for a bypass and possibly other things" (21 Dec 2020 10:15)    HPI:  76 y/o M w/ PMH of HTN, HLD, CAD s/p stent in 2010, Colon CA s/p resection + radiation + ileostomy placement and reversal, and an Aortic Ascending aneurysm 4.7cm presents to PST for a CABG x3 and possible Ascending Aortic Aneurysm Repair on 12/28/20. Echocardiogram showed a new decline in LVEF to 29% from 38%. Denies N/V/D, fevers, chills, cough, palpitations, chest pain, syncope, orthopnea, nocturnal paroxysmal dyspnea, edema, cyanosis, heart murmurs, varicosities, phlebitis, and claudication. COVID testing scheduled at [facility] on 12/26/2020.  (21 Dec 2020 10:15)    Surgery/Hospital course:  12/28 S/P CABG x3. Ascending aorta replacement for ascending aorta aneurysm.     Today/Overnight:  Patient with history of multivessel coronary artery disease, subsequently underwent coronary artery bypass grafting x3 procedure earlier today. Patient returned intubated from the operating room to the cardiothoracic intensive care unit for recuperation and close monitoring.    Vital Signs Last 24 Hrs  T(C): 35.5 (28 Dec 2020 17:00), Max: 36.7 (28 Dec 2020 05:43)  T(F): 95.9 (28 Dec 2020 17:00), Max: 98.1 (28 Dec 2020 05:43)  HR: 87 (28 Dec 2020 19:15) (71 - 87)  BP: 145/89 (28 Dec 2020 05:43) (145/89 - 145/89)  BP(mean): --  RR: 20 (28 Dec 2020 19:15) (14 - 22)  SpO2: 100% (28 Dec 2020 19:15) (94% - 100%)  ============================I/O===========================  I&O's Detail    28 Dec 2020 07:01  -  28 Dec 2020 19:34  --------------------------------------------------------  Total IN: 1406.9 mL / Total OUT: 385 mL / Total NET: 1021.9 mL      ============================ LABS =========================                        12.0   13.98 )-----------( 131      ( 28 Dec 2020 16:34 )             37.5     12-28    142  |  105  |  15  ----------------------------<  191<H>  3.9   |  21<L>  |  0.68    Ca    8.7      28 Dec 2020 16:38  Phos  3.7     12-28  Mg     3.1     12-28    TPro  4.9<L>  /  Alb  3.6  /  TBili  1.6<H>  /  DBili  x   /  AST  82<H>  /  ALT  31  /  AlkPhos  41  12-28    LIVER FUNCTIONS - ( 28 Dec 2020 16:38 )  Alb: 3.6 g/dL / Pro: 4.9 g/dL / ALK PHOS: 41 U/L / ALT: 31 U/L / AST: 82 U/L / GGT: x           PT/INR - ( 28 Dec 2020 16:34 )   PT: 12.2 sec;   INR: 1.02 ratio         PTT - ( 28 Dec 2020 16:34 )  PTT:26.2 sec  ABG - ( 28 Dec 2020 17:56 )  pH, Arterial: 7.32  pH, Blood: x     /  pCO2: 39    /  pO2: 105   / HCO3: 19    / Base Excess: -5.9  /  SaO2: 98          ======================Micro/Rad/Cardio=================  Telemetry: Reviewed   CXR: Reviewed  Echo: Reviewed  ======================================================  PAST MEDICAL & SURGICAL HISTORY:  Ascending aortic aneurysm  4.7cm    ASHD (arteriosclerotic heart disease)    HFrEF (heart failure with reduced ejection fraction)    Gout    HLD (hyperlipidemia)    Colon carcinoma  Sp Ileostomy and reverasl with chemo/rt (2012)    CAD (coronary artery disease)  SP ptca w stent 5/2010    HTN (Hypertension)    Incisional hernia  2013    History of reversal of ileostomy  2012    S/P coronary artery stent placement  2010 w/ ileostomy    S/P colon resection      ========================ASSESSMENT ================  CAD S/P CABG x3 on 12/28  Ascending aorta aneurysm S/P Ascending aorta replacement on 12/28  Stress Hyperglycemia  Leukocytosis  Obesity OHS / SUSAN     Plan:  ====================== NEUROLOGY=====================  Addressed analgesic regimen to optimize function. Off sedation, continue to assess neurological status per ICU protocol.    ==================== RESPIRATORY======================  On full vent support, requiring close monitoring of respiratory rate, breathing pattern, pulse oximetry monitoring, and intermittent blood gas analysis. Will wean once patient is more stable. Increased concern for atelectasis due to obesity related restrictive lung disease.     Mechanical Ventilation:  Mode: AC/ CMV (Assist Control/ Continuous Mandatory Ventilation)  RR (machine): 18  TV (machine): 750  FiO2: 100  PEEP: 7  ITime: 1.3  MAP: 12  PIP: 24      ====================CARDIOVASCULAR==================  Patient with history of multivessel coronary artery disease, subsequently underwent coronary artery bypass grafting x3 procedure earlier today. Requiring inotropic support with IV Dobutamine and IV Primacor infusions along with vasopressor support with IV Levophed and IV Vasopressin infusions to maintain mean arterial pressure of more than 65. Invasive hemodynamic monitoring with a Central line & a Radial A-line were required for the following of serial CI's/MV02's and continuous central venous and MAP/BP monitoring to ensure adequate cardiovascular support.     ===================HEMATOLOGIC/ONC ===================  Hematocrit of 35%. Monitor hemoglobin and hematocrit levels along with platelet levels. Monitor chest tube outputs.     ===================== RENAL =========================  Optimize renal perfusion with adequate volume resuscitation and continued monitoring of urine output, fluid balance, BUN/Creatinine.     ==================== GASTROINTESTINAL===================  NPO after recent procedure, advance diet as tolerated. Continue IVP Protonix for stress ulcer prophylaxis. Bowel regimen with Miralax.     =======================    ENDOCRINE  =====================  Metabolic instability, stress hyperglycemia, requiring glucose control with insulin gtt, titrate as per insulin drip protocol along with hourly glucose checks.     ========================INFECTIOUS DISEASE================  Continue Cefuroxime for perioperative antibiotic coverage. Leukocytosis with WBC 13.98. Monitor leukocytosis, for fever, along with signs and symptoms of an active infection.      Patient requires continuous monitoring with bedside rhythm monitoring, pulse oximetry monitoring, ventilator management, and continuous central venous and arterial pressure monitoring; and intermittent blood gas analysis.  Care plan discussed with ICU care team.    Patient remained critical, at risk for life threatening decompensation.   I have spent 35 minutes providing acute care with multiple re-evaluations throughout the evening.     By signing my name below, I, Nazanin Harvey, attest that this documentation has been prepared under the direction and in the presence of Anne Nielson MD.  Electronically signed: Aly Nelson, 12-28-20 @ 19:34    I, Anne Nielson MD, personally performed the services described in this documentation. All medical record entries made by the shaynaibdebbie were at my direction and in my presence. I have reviewed the chart and agree that the record reflects my personal performance and is accurate and complete  Electronically signed: Anne Nielson MD, 12-28-20 @ 19:34       CHARLINE LAFLEUR  MRN-[id number]  Patient is a 75y old  Male who presents with a chief complaint of "Here for a bypass and possibly other things" (21 Dec 2020 10:15)    HPI:  74 y/o M w/ PMH of HTN, HLD, CAD s/p stent in 2010, Colon CA s/p resection + radiation + ileostomy placement and reversal, and an Aortic Ascending aneurysm 4.7cm presents to PST for a CABG x3 and possible Ascending Aortic Aneurysm Repair on 12/28/20. Echocardiogram showed a new decline in LVEF to 29% from 38%. Denies N/V/D, fevers, chills, cough, palpitations, chest pain, syncope, orthopnea, nocturnal paroxysmal dyspnea, edema, cyanosis, heart murmurs, varicosities, phlebitis, and claudication. COVID testing scheduled at [facility] on 12/26/2020.  (21 Dec 2020 10:15)    Surgery/Hospital course:  12/28 S/P CABG x3. Ascending aorta replacement for ascending aorta aneurysm.     Today/Overnight:  Patient with history of multivessel coronary artery disease, subsequently underwent coronary artery bypass grafting x3 procedure earlier today. Patient returned intubated from the operating room to the cardiothoracic intensive care unit for recuperation and close monitoring.    Vital Signs Last 24 Hrs  T(C): 35.5 (28 Dec 2020 17:00), Max: 36.7 (28 Dec 2020 05:43)  T(F): 95.9 (28 Dec 2020 17:00), Max: 98.1 (28 Dec 2020 05:43)  HR: 87 (28 Dec 2020 19:15) (71 - 87)  BP: 145/89 (28 Dec 2020 05:43) (145/89 - 145/89)  BP(mean): --  RR: 20 (28 Dec 2020 19:15) (14 - 22)  SpO2: 100% (28 Dec 2020 19:15) (94% - 100%)  ============================I/O===========================  I&O's Detail    28 Dec 2020 07:01  -  28 Dec 2020 19:34  --------------------------------------------------------  Total IN: 1406.9 mL / Total OUT: 385 mL / Total NET: 1021.9 mL      ============================ LABS =========================                        12.0   13.98 )-----------( 131      ( 28 Dec 2020 16:34 )             37.5     12-28    142  |  105  |  15  ----------------------------<  191<H>  3.9   |  21<L>  |  0.68    Ca    8.7      28 Dec 2020 16:38  Phos  3.7     12-28  Mg     3.1     12-28    TPro  4.9<L>  /  Alb  3.6  /  TBili  1.6<H>  /  DBili  x   /  AST  82<H>  /  ALT  31  /  AlkPhos  41  12-28    LIVER FUNCTIONS - ( 28 Dec 2020 16:38 )  Alb: 3.6 g/dL / Pro: 4.9 g/dL / ALK PHOS: 41 U/L / ALT: 31 U/L / AST: 82 U/L / GGT: x           PT/INR - ( 28 Dec 2020 16:34 )   PT: 12.2 sec;   INR: 1.02 ratio         PTT - ( 28 Dec 2020 16:34 )  PTT:26.2 sec  ABG - ( 28 Dec 2020 17:56 )  pH, Arterial: 7.32  pH, Blood: x     /  pCO2: 39    /  pO2: 105   / HCO3: 19    / Base Excess: -5.9  /  SaO2: 98          ======================Micro/Rad/Cardio=================  Telemetry: Reviewed   CXR: Reviewed  Echo: Reviewed  ======================================================  PAST MEDICAL & SURGICAL HISTORY:  Ascending aortic aneurysm  4.7cm    ASHD (arteriosclerotic heart disease)    HFrEF (heart failure with reduced ejection fraction)    Gout    HLD (hyperlipidemia)    Colon carcinoma  Sp Ileostomy and reverasl with chemo/rt (2012)    CAD (coronary artery disease)  SP ptca w stent 5/2010    HTN (Hypertension)    Incisional hernia  2013    History of reversal of ileostomy  2012    S/P coronary artery stent placement  2010 w/ ileostomy    S/P colon resection      ========================ASSESSMENT ================  CAD S/P CABG x3 on 12/28  Ascending aorta aneurysm S/P Ascending aorta replacement on 12/28  Stress Hyperglycemia  Leukocytosis  Obesity OHS / SUSAN     Plan:  ====================== NEUROLOGY=====================  Addressed analgesic regimen to optimize function. Off sedation, continue to assess neurological status per ICU protocol.    ==================== RESPIRATORY======================  On full vent support, requiring close monitoring of respiratory rate, breathing pattern, pulse oximetry monitoring, and intermittent blood gas analysis. Will wean once patient is more stable. Increased concern for atelectasis due to obesity related restrictive lung disease.     Mechanical Ventilation:  Mode: AC/ CMV (Assist Control/ Continuous Mandatory Ventilation)  RR (machine): 18  TV (machine): 750  FiO2: 100  PEEP: 7  ITime: 1.3  MAP: 12  PIP: 24    ====================CARDIOVASCULAR==================  Patient with history of multivessel coronary artery disease, subsequently underwent coronary artery bypass grafting x3 procedure earlier today. Cardioggenic shock requiring inotropic support with IV Dobutamine and IV Primacor infusions along with vasopressor support with an IV Levophed and an IV Vasopressin infusion to maintain mean arterial pressure of more than 65. Fluid resuscitation ordered for lactic acidosis. Patient has had several runs of nonsustained ventricular tachycardia, IV Amiodarone ordered along with potassium replacement. Invasive hemodynamic monitoring with a pulmonary artery catheter & a Radial A-line were required for the following of serial CI's/MV02's and continuous central venous, pulmonary artery and MAP/BP monitoring to ensure adequate cardiovascular support.     ===================HEMATOLOGIC/ONC ===================  Hematocrit of 35%. Monitor hemoglobin and hematocrit levels along with platelet levels. Monitor chest tube outputs.     ===================== RENAL =========================  Optimize renal perfusion with adequate volume resuscitation and continued monitoring of urine output, fluid balance, and BUN/Creatinine.     ==================== GASTROINTESTINAL===================  NPO after recent procedure, advance diet as tolerated. Continue IVP Protonix for stress ulcer prophylaxis. Bowel regimen with Miralax.     =======================    ENDOCRINE  =====================  Metabolic instability, stress hyperglycemia, requiring glucose control with insulin infusion, titrate as per insulin drip protocol along with hourly glucose checks.     ========================INFECTIOUS DISEASE================  Continue Cefuroxime for perioperative antibiotic coverage. Leukocytosis with WBC 13.98. Monitor leukocytosis, for fever, along with signs and symptoms of an active infection.      Patient requires continuous monitoring with bedside rhythm monitoring, pulse oximetry monitoring, ventilator management, and continuous central venous and arterial pressure monitoring; and intermittent blood gas analysis.  Care plan discussed with ICU care team.    Patient remained critical, at risk for life threatening decompensation.   I have spent 45 minutes providing acute care with multiple re-evaluations throughout the evening.     By signing my name below, I, Nazanin Harvey, attest that this documentation has been prepared under the direction and in the presence of Anne Nielson MD.  Electronically signed: Aly Nelson, 12-28-20 @ 19:34    I, Anne Nielson MD, personally performed the services described in this documentation. All medical record entries made by the scribe were at my direction and in my presence. I have reviewed the chart and agree that the record reflects my personal performance and is accurate and complete  Electronically signed: Anne Nielson MD, 12-28-20 @ 19:34

## 2020-12-29 LAB
ALBUMIN SERPL ELPH-MCNC: 3.7 G/DL — SIGNIFICANT CHANGE UP (ref 3.3–5)
ALBUMIN SERPL ELPH-MCNC: 3.8 G/DL — SIGNIFICANT CHANGE UP (ref 3.3–5)
ALP SERPL-CCNC: 29 U/L — LOW (ref 40–120)
ALP SERPL-CCNC: 29 U/L — LOW (ref 40–120)
ALT FLD-CCNC: 25 U/L — SIGNIFICANT CHANGE UP (ref 10–45)
ALT FLD-CCNC: 27 U/L — SIGNIFICANT CHANGE UP (ref 10–45)
ANION GAP SERPL CALC-SCNC: 15 MMOL/L — SIGNIFICANT CHANGE UP (ref 5–17)
ANION GAP SERPL CALC-SCNC: 9 MMOL/L — SIGNIFICANT CHANGE UP (ref 5–17)
AST SERPL-CCNC: 59 U/L — HIGH (ref 10–40)
AST SERPL-CCNC: 76 U/L — HIGH (ref 10–40)
BASE EXCESS BLDMV CALC-SCNC: -0.6 MMOL/L — SIGNIFICANT CHANGE UP (ref -3–3)
BASE EXCESS BLDMV CALC-SCNC: -1.7 MMOL/L — SIGNIFICANT CHANGE UP (ref -3–3)
BASE EXCESS BLDMV CALC-SCNC: 1.1 MMOL/L — SIGNIFICANT CHANGE UP (ref -3–3)
BASE EXCESS BLDMV CALC-SCNC: 1.1 MMOL/L — SIGNIFICANT CHANGE UP (ref -3–3)
BASE EXCESS BLDMV CALC-SCNC: 2 MMOL/L — SIGNIFICANT CHANGE UP (ref -3–3)
BILIRUB SERPL-MCNC: 0.6 MG/DL — SIGNIFICANT CHANGE UP (ref 0.2–1.2)
BILIRUB SERPL-MCNC: 1.4 MG/DL — HIGH (ref 0.2–1.2)
BUN SERPL-MCNC: 16 MG/DL — SIGNIFICANT CHANGE UP (ref 7–23)
BUN SERPL-MCNC: 18 MG/DL — SIGNIFICANT CHANGE UP (ref 7–23)
CALCIUM SERPL-MCNC: 8.4 MG/DL — SIGNIFICANT CHANGE UP (ref 8.4–10.5)
CALCIUM SERPL-MCNC: 8.6 MG/DL — SIGNIFICANT CHANGE UP (ref 8.4–10.5)
CHLORIDE SERPL-SCNC: 106 MMOL/L — SIGNIFICANT CHANGE UP (ref 96–108)
CHLORIDE SERPL-SCNC: 106 MMOL/L — SIGNIFICANT CHANGE UP (ref 96–108)
CO2 BLDMV-SCNC: 24 MMOL/L — SIGNIFICANT CHANGE UP (ref 21–29)
CO2 BLDMV-SCNC: 25 MMOL/L — SIGNIFICANT CHANGE UP (ref 21–29)
CO2 BLDMV-SCNC: 25 MMOL/L — SIGNIFICANT CHANGE UP (ref 21–29)
CO2 BLDMV-SCNC: 26 MMOL/L — SIGNIFICANT CHANGE UP (ref 21–29)
CO2 BLDMV-SCNC: 27 MMOL/L — SIGNIFICANT CHANGE UP (ref 21–29)
CO2 SERPL-SCNC: 19 MMOL/L — LOW (ref 22–31)
CO2 SERPL-SCNC: 23 MMOL/L — SIGNIFICANT CHANGE UP (ref 22–31)
CREAT SERPL-MCNC: 0.91 MG/DL — SIGNIFICANT CHANGE UP (ref 0.5–1.3)
CREAT SERPL-MCNC: 0.96 MG/DL — SIGNIFICANT CHANGE UP (ref 0.5–1.3)
GAS PNL BLDA: SIGNIFICANT CHANGE UP
GAS PNL BLDMV: SIGNIFICANT CHANGE UP
GLUCOSE BLDC GLUCOMTR-MCNC: 100 MG/DL — HIGH (ref 70–99)
GLUCOSE BLDC GLUCOMTR-MCNC: 102 MG/DL — HIGH (ref 70–99)
GLUCOSE BLDC GLUCOMTR-MCNC: 108 MG/DL — HIGH (ref 70–99)
GLUCOSE BLDC GLUCOMTR-MCNC: 110 MG/DL — HIGH (ref 70–99)
GLUCOSE BLDC GLUCOMTR-MCNC: 122 MG/DL — HIGH (ref 70–99)
GLUCOSE BLDC GLUCOMTR-MCNC: 132 MG/DL — HIGH (ref 70–99)
GLUCOSE BLDC GLUCOMTR-MCNC: 137 MG/DL — HIGH (ref 70–99)
GLUCOSE BLDC GLUCOMTR-MCNC: 138 MG/DL — HIGH (ref 70–99)
GLUCOSE BLDC GLUCOMTR-MCNC: 138 MG/DL — HIGH (ref 70–99)
GLUCOSE BLDC GLUCOMTR-MCNC: 141 MG/DL — HIGH (ref 70–99)
GLUCOSE BLDC GLUCOMTR-MCNC: 147 MG/DL — HIGH (ref 70–99)
GLUCOSE BLDC GLUCOMTR-MCNC: 155 MG/DL — HIGH (ref 70–99)
GLUCOSE BLDC GLUCOMTR-MCNC: 156 MG/DL — HIGH (ref 70–99)
GLUCOSE BLDC GLUCOMTR-MCNC: 156 MG/DL — HIGH (ref 70–99)
GLUCOSE BLDC GLUCOMTR-MCNC: 170 MG/DL — HIGH (ref 70–99)
GLUCOSE SERPL-MCNC: 103 MG/DL — HIGH (ref 70–99)
GLUCOSE SERPL-MCNC: 174 MG/DL — HIGH (ref 70–99)
HCO3 BLDMV-SCNC: 23 MMOL/L — SIGNIFICANT CHANGE UP (ref 20–28)
HCO3 BLDMV-SCNC: 23 MMOL/L — SIGNIFICANT CHANGE UP (ref 20–28)
HCO3 BLDMV-SCNC: 24 MMOL/L — SIGNIFICANT CHANGE UP (ref 20–28)
HCO3 BLDMV-SCNC: 25 MMOL/L — SIGNIFICANT CHANGE UP (ref 20–28)
HCO3 BLDMV-SCNC: 26 MMOL/L — SIGNIFICANT CHANGE UP (ref 20–28)
HCT VFR BLD CALC: 31 % — LOW (ref 39–50)
HGB BLD-MCNC: 10 G/DL — LOW (ref 13–17)
HOROWITZ INDEX BLDMV+IHG-RTO: 40 — SIGNIFICANT CHANGE UP
HOROWITZ INDEX BLDMV+IHG-RTO: 40 — SIGNIFICANT CHANGE UP
HOROWITZ INDEX BLDMV+IHG-RTO: 50 — SIGNIFICANT CHANGE UP
HOROWITZ INDEX BLDMV+IHG-RTO: 60 — SIGNIFICANT CHANGE UP
HOROWITZ INDEX BLDMV+IHG-RTO: 80 — SIGNIFICANT CHANGE UP
MAGNESIUM SERPL-MCNC: 3.1 MG/DL — HIGH (ref 1.6–2.6)
MCHC RBC-ENTMCNC: 29.5 PG — SIGNIFICANT CHANGE UP (ref 27–34)
MCHC RBC-ENTMCNC: 32.3 GM/DL — SIGNIFICANT CHANGE UP (ref 32–36)
MCV RBC AUTO: 91.4 FL — SIGNIFICANT CHANGE UP (ref 80–100)
NRBC # BLD: 0 /100 WBCS — SIGNIFICANT CHANGE UP (ref 0–0)
O2 CT VFR BLD CALC: 29 MMHG — LOW (ref 30–65)
O2 CT VFR BLD CALC: 31 MMHG — SIGNIFICANT CHANGE UP (ref 30–65)
O2 CT VFR BLD CALC: 32 MMHG — SIGNIFICANT CHANGE UP (ref 30–65)
O2 CT VFR BLD CALC: 33 MMHG — SIGNIFICANT CHANGE UP (ref 30–65)
O2 CT VFR BLD CALC: 33 MMHG — SIGNIFICANT CHANGE UP (ref 30–65)
PCO2 BLDMV: 35 MMHG — SIGNIFICANT CHANGE UP (ref 30–65)
PCO2 BLDMV: 38 MMHG — SIGNIFICANT CHANGE UP (ref 30–65)
PCO2 BLDMV: 38 MMHG — SIGNIFICANT CHANGE UP (ref 30–65)
PCO2 BLDMV: 39 MMHG — SIGNIFICANT CHANGE UP (ref 30–65)
PCO2 BLDMV: 40 MMHG — SIGNIFICANT CHANGE UP (ref 30–65)
PH BLDMV: 7.38 — SIGNIFICANT CHANGE UP (ref 7.32–7.45)
PH BLDMV: 7.4 — SIGNIFICANT CHANGE UP (ref 7.32–7.45)
PH BLDMV: 7.43 — SIGNIFICANT CHANGE UP (ref 7.32–7.45)
PH BLDMV: 7.44 — SIGNIFICANT CHANGE UP (ref 7.32–7.45)
PH BLDMV: 7.46 — HIGH (ref 7.32–7.45)
PHOSPHATE SERPL-MCNC: 0.8 MG/DL — CRITICAL LOW (ref 2.5–4.5)
PLATELET # BLD AUTO: 101 K/UL — LOW (ref 150–400)
POTASSIUM SERPL-MCNC: 4.4 MMOL/L — SIGNIFICANT CHANGE UP (ref 3.5–5.3)
POTASSIUM SERPL-MCNC: 4.5 MMOL/L — SIGNIFICANT CHANGE UP (ref 3.5–5.3)
POTASSIUM SERPL-SCNC: 4.4 MMOL/L — SIGNIFICANT CHANGE UP (ref 3.5–5.3)
POTASSIUM SERPL-SCNC: 4.5 MMOL/L — SIGNIFICANT CHANGE UP (ref 3.5–5.3)
PROT SERPL-MCNC: 4.9 G/DL — LOW (ref 6–8.3)
PROT SERPL-MCNC: 4.9 G/DL — LOW (ref 6–8.3)
RBC # BLD: 3.39 M/UL — LOW (ref 4.2–5.8)
RBC # FLD: 14.3 % — SIGNIFICANT CHANGE UP (ref 10.3–14.5)
SAO2 % BLDMV: 58 % — LOW (ref 60–90)
SAO2 % BLDMV: 62 % — SIGNIFICANT CHANGE UP (ref 60–90)
SAO2 % BLDMV: 63 % — SIGNIFICANT CHANGE UP (ref 60–90)
SAO2 % BLDMV: 64 % — SIGNIFICANT CHANGE UP (ref 60–90)
SAO2 % BLDMV: 64 % — SIGNIFICANT CHANGE UP (ref 60–90)
SODIUM SERPL-SCNC: 138 MMOL/L — SIGNIFICANT CHANGE UP (ref 135–145)
SODIUM SERPL-SCNC: 140 MMOL/L — SIGNIFICANT CHANGE UP (ref 135–145)
WBC # BLD: 10.34 K/UL — SIGNIFICANT CHANGE UP (ref 3.8–10.5)
WBC # FLD AUTO: 10.34 K/UL — SIGNIFICANT CHANGE UP (ref 3.8–10.5)

## 2020-12-29 PROCEDURE — 99233 SBSQ HOSP IP/OBS HIGH 50: CPT | Mod: 57

## 2020-12-29 PROCEDURE — 99292 CRITICAL CARE ADDL 30 MIN: CPT

## 2020-12-29 PROCEDURE — 71045 X-RAY EXAM CHEST 1 VIEW: CPT | Mod: 26

## 2020-12-29 PROCEDURE — 93306 TTE W/DOPPLER COMPLETE: CPT | Mod: 26

## 2020-12-29 PROCEDURE — 99291 CRITICAL CARE FIRST HOUR: CPT

## 2020-12-29 PROCEDURE — 33210 INSERT ELECTRD/PM CATH SNGL: CPT

## 2020-12-29 PROCEDURE — 93010 ELECTROCARDIOGRAM REPORT: CPT

## 2020-12-29 RX ORDER — AMIODARONE HYDROCHLORIDE 400 MG/1
1 TABLET ORAL
Qty: 900 | Refills: 0 | Status: DISCONTINUED | OUTPATIENT
Start: 2020-12-29 | End: 2020-12-29

## 2020-12-29 RX ORDER — CALCIUM GLUCONATE 100 MG/ML
1 VIAL (ML) INTRAVENOUS ONCE
Refills: 0 | Status: COMPLETED | OUTPATIENT
Start: 2020-12-29 | End: 2020-12-29

## 2020-12-29 RX ORDER — PROPOFOL 10 MG/ML
10 INJECTION, EMULSION INTRAVENOUS
Qty: 500 | Refills: 0 | Status: DISCONTINUED | OUTPATIENT
Start: 2020-12-29 | End: 2020-12-30

## 2020-12-29 RX ORDER — POTASSIUM CHLORIDE 20 MEQ
10 PACKET (EA) ORAL
Refills: 0 | Status: COMPLETED | OUTPATIENT
Start: 2020-12-29 | End: 2020-12-29

## 2020-12-29 RX ORDER — HYDROMORPHONE HYDROCHLORIDE 2 MG/ML
1 INJECTION INTRAMUSCULAR; INTRAVENOUS; SUBCUTANEOUS ONCE
Refills: 0 | Status: DISCONTINUED | OUTPATIENT
Start: 2020-12-29 | End: 2020-12-29

## 2020-12-29 RX ORDER — ALBUMIN HUMAN 25 %
250 VIAL (ML) INTRAVENOUS ONCE
Refills: 0 | Status: COMPLETED | OUTPATIENT
Start: 2020-12-29 | End: 2020-12-29

## 2020-12-29 RX ORDER — CHLORHEXIDINE GLUCONATE 213 G/1000ML
15 SOLUTION TOPICAL EVERY 12 HOURS
Refills: 0 | Status: DISCONTINUED | OUTPATIENT
Start: 2020-12-29 | End: 2020-12-30

## 2020-12-29 RX ORDER — CHLORPROMAZINE HCL 10 MG
25 TABLET ORAL ONCE
Refills: 0 | Status: COMPLETED | OUTPATIENT
Start: 2020-12-29 | End: 2020-12-29

## 2020-12-29 RX ORDER — HYDROMORPHONE HYDROCHLORIDE 2 MG/ML
0.5 INJECTION INTRAMUSCULAR; INTRAVENOUS; SUBCUTANEOUS ONCE
Refills: 0 | Status: DISCONTINUED | OUTPATIENT
Start: 2020-12-29 | End: 2020-12-29

## 2020-12-29 RX ADMIN — PROPOFOL 7.05 MICROGRAM(S)/KG/MIN: 10 INJECTION, EMULSION INTRAVENOUS at 15:22

## 2020-12-29 RX ADMIN — INSULIN HUMAN 3 UNIT(S)/HR: 100 INJECTION, SOLUTION SUBCUTANEOUS at 15:22

## 2020-12-29 RX ADMIN — Medication 25 MILLIGRAM(S): at 23:03

## 2020-12-29 RX ADMIN — Medication 26.4 MICROGRAM(S)/KG/MIN: at 15:21

## 2020-12-29 RX ADMIN — Medication 100 GRAM(S): at 13:14

## 2020-12-29 RX ADMIN — CHLORHEXIDINE GLUCONATE 15 MILLILITER(S): 213 SOLUTION TOPICAL at 17:11

## 2020-12-29 RX ADMIN — CHLORHEXIDINE GLUCONATE 15 MILLILITER(S): 213 SOLUTION TOPICAL at 06:28

## 2020-12-29 RX ADMIN — HYDROMORPHONE HYDROCHLORIDE 0.5 MILLIGRAM(S): 2 INJECTION INTRAMUSCULAR; INTRAVENOUS; SUBCUTANEOUS at 20:36

## 2020-12-29 RX ADMIN — MILRINONE LACTATE 7.05 MICROGRAM(S)/KG/MIN: 1 INJECTION, SOLUTION INTRAVENOUS at 15:22

## 2020-12-29 RX ADMIN — Medication 100 MILLIGRAM(S): at 13:51

## 2020-12-29 RX ADMIN — HYDROMORPHONE HYDROCHLORIDE 0.5 MILLIGRAM(S): 2 INJECTION INTRAMUSCULAR; INTRAVENOUS; SUBCUTANEOUS at 20:21

## 2020-12-29 RX ADMIN — HYDROMORPHONE HYDROCHLORIDE 1 MILLIGRAM(S): 2 INJECTION INTRAMUSCULAR; INTRAVENOUS; SUBCUTANEOUS at 00:35

## 2020-12-29 RX ADMIN — HYDROMORPHONE HYDROCHLORIDE 1 MILLIGRAM(S): 2 INJECTION INTRAMUSCULAR; INTRAVENOUS; SUBCUTANEOUS at 00:50

## 2020-12-29 RX ADMIN — Medication 85 MILLIMOLE(S): at 03:23

## 2020-12-29 RX ADMIN — PANTOPRAZOLE SODIUM 40 MILLIGRAM(S): 20 TABLET, DELAYED RELEASE ORAL at 12:10

## 2020-12-29 RX ADMIN — CHLORHEXIDINE GLUCONATE 1 APPLICATION(S): 213 SOLUTION TOPICAL at 05:59

## 2020-12-29 RX ADMIN — Medication 100 MILLIGRAM(S): at 05:57

## 2020-12-29 RX ADMIN — Medication 125 MILLILITER(S): at 03:30

## 2020-12-29 RX ADMIN — Medication 100 MILLIEQUIVALENT(S): at 01:19

## 2020-12-29 RX ADMIN — Medication 100 MILLIGRAM(S): at 21:40

## 2020-12-29 RX ADMIN — Medication 22 MICROGRAM(S)/KG/MIN: at 15:22

## 2020-12-29 RX ADMIN — VASOPRESSIN 2.4 UNIT(S)/MIN: 20 INJECTION INTRAVENOUS at 15:21

## 2020-12-29 NOTE — PROGRESS NOTE ADULT - SUBJECTIVE AND OBJECTIVE BOX
CHARLINE LAFLEUR  MRN-[id number]  Patient is a 75y old  Male who presents with a chief complaint of "Here for a bypass and possibly other things" (21 Dec 2020 10:15)    HPI:  76 y/o M w/ PMH of HTN, HLD, CAD s/p stent in 2010, Colon CA s/p resection + radiation + ileostomy placement and reversal, and an Aortic Ascending aneurysm 4.7cm presents to PST for a CABG x3 and possible Ascending Aortic Aneurysm Repair on 12/28/20. Echocardiogram showed a new decline in LVEF to 29% from 38%. Denies N/V/D, fevers, chills, cough, palpitations, chest pain, syncope, orthopnea, nocturnal paroxysmal dyspnea, edema, cyanosis, heart murmurs, varicosities, phlebitis, and claudication. COVID testing scheduled at [facility] on 12/26/2020.  (21 Dec 2020 10:15)    Surgery/Hospital course:  12/28 S/P CABG x3. Ascending aorta replacement for ascending aorta aneurysm.     ICU Vital Signs Last 24 Hrs  T(C): 37.8 (29 Dec 2020 04:00), Max: 37.8 (29 Dec 2020 04:00)  T(F): 100 (29 Dec 2020 04:00), Max: 100 (29 Dec 2020 04:00)  HR: 89 (29 Dec 2020 04:00) (71 - 91)  BP: 145/89 (28 Dec 2020 05:43) (145/89 - 145/89)  BP(mean): --  ABP: 125/57 (29 Dec 2020 04:00) (79/62 - 149/71)  ABP(mean): 72 (29 Dec 2020 04:00) (63 - 88)  RR: 18 (29 Dec 2020 04:00) (14 - 22)  SpO2: 98% (29 Dec 2020 04:00) (94% - 100%)  ============================I/O===========================  I&O's Summary    28 Dec 2020 07:01  -  29 Dec 2020 04:56  --------------------------------------------------------  IN: 3733.3 mL / OUT: 1410 mL / NET: 2323.3 mL    ============================ LABS =========================                         10.0   10.34 )-----------( 101      ( 29 Dec 2020 00:45 )             31.0   12-29    140  |  106  |  16  ----------------------------<  174<H>  4.5   |  19<L>  |  0.96    Ca    8.6      29 Dec 2020 00:45  Phos  0.8     12-29  Mg     3.1     12-29    TPro  4.9<L>  /  Alb  3.8  /  TBili  1.4<H>  /  DBili  x   /  AST  76<H>  /  ALT  27  /  AlkPhos  29<L>  12-29    ABG - ( 29 Dec 2020 04:04 )  pH, Arterial: 7.44  pH, Blood: x     /  pCO2: 33    /  pO2: 93    / HCO3: 22    / Base Excess: -1.4  /  SaO2: 98             ======================Micro/Rad/Cardio=================  Telemetry: Reviewed   CXR: Reviewed  Echo: Reviewed  ======================================================  PAST MEDICAL & SURGICAL HISTORY:  Ascending aortic aneurysm  4.7cm    ASHD (arteriosclerotic heart disease)    HFrEF (heart failure with reduced ejection fraction)    Gout    HLD (hyperlipidemia)    Colon carcinoma  Sp Ileostomy and reverasl with chemo/rt (2012)    CAD (coronary artery disease)  SP ptca w stent 5/2010    HTN (Hypertension)    Incisional hernia  2013    History of reversal of ileostomy  2012    S/P coronary artery stent placement  2010 w/ ileostomy    S/P colon resection      ========================ASSESSMENT ================  CAD S/P CABG x3 on 12/28  Ascending aorta aneurysm S/P Ascending aorta replacement on 12/28  Stress Hyperglycemia  Leukocytosis  Obesity OHS / SUSAN     Plan:  ====================== NEUROLOGY=====================  Addressed analgesic regimen to optimize function. Continue on diprivan infusion to help with patient vent synchrony. Continue to assess neurological status per ICU protocol.    ==================== RESPIRATORY======================  On full vent support, requiring close monitoring of respiratory rate, breathing pattern, pulse oximetry monitoring, and intermittent blood gas analysis.   Fi02 weaned to 60% based on oxygen saturation.    Mode: AC/ CMV (Assist Control/ Continuous Mandatory Ventilation)  RR (machine): 18  TV (machine): 750  FiO2: 100  PEEP: 10  ITime: 1  MAP: 13  PIP: 26    ====================CARDIOVASCULAR==================  Patient with history of multivessel coronary artery disease, subsequently underwent coronary artery bypass grafting x3 procedure 12/28. Cardiogenic shock requiring inotropic support with IV Dobutamine and IV Primacor infusions along with vasopressor support with an IV Levophed and an IV Vasopressin infusion to maintain mean arterial pressure of more than 65. Fluid resuscitation ordered for lactic acidosis and ongoing vasopressor requirement as well as drop in cardiac index. Patient has had several runs of nonsustained ventricular tachycardia, Continues on an IV Amiodarone infusion. Invasive hemodynamic monitoring with a pulmonary artery catheter & a Radial A-line were required for the following of serial CI's/MV02's and continuous central venous, pulmonary artery and MAP/BP monitoring to ensure adequate cardiovascular support.     ===================HEMATOLOGIC/ONC ===================  Hematocrit of 31%. Monitor hemoglobin and hematocrit levels along with platelet levels. Monitor chest tube outputs.     ===================== RENAL =========================  Optimize renal perfusion with adequate volume resuscitation and continued monitoring of urine output, fluid balance, and BUN/Creatinine.     ==================== GASTROINTESTINAL===================  NPO after recent procedure. Continue IVP Protonix for stress ulcer prophylaxis. Bowel regimen with Miralax.     =======================    ENDOCRINE  =====================  Metabolic instability, stress hyperglycemia, requiring glucose control with insulin infusion, titrate as per insulin drip protocol along with hourly glucose checks.     ========================INFECTIOUS DISEASE================  Continue Cefuroxime for perioperative antibiotic coverage. Leukocytosis with WBC 10.34. Monitor leukocytosis, for fever, along with signs and symptoms of an active infection.      Patient requires continuous monitoring with bedside rhythm monitoring, pulse oximetry monitoring, ventilator management, and continuous central venous and arterial pressure monitoring; and intermittent blood gas analysis.  Care plan discussed with ICU care team.    Patient remained critical, at risk for life threatening decompensation.   I have spent 50 minutes providing acute care with multiple re-evaluations overnight and during the early morning hours.

## 2020-12-29 NOTE — CONSULT NOTE ADULT - SUBJECTIVE AND OBJECTIVE BOX
Patient seen and evaluated at bedside    Chief Complaint:    HPI:  74 y/o M w/ PMH of HTN, HLD, CAD s/p stent in 2010, Colon CA s/p resection + radiation + ileostomy placement and reversal, and an Aortic Ascending aneurysm 4.7cm presents to PST for a CABG x3 and possible Ascending Aortic Aneurysm Repair on 12/28/20. Echocardiogram showed a new decline in LVEF to 29% from 38%. Now POD1 s/p CABG and ascening aortic aneurysm repair and replacement. Overnight, was not pacing with epicardial leads with underlying rhythm reported to be in CHB, sinus with wide ventricular escape with rates in the 30s. TVP placed this AM with capture. Threshold at 6 and pacing at 80bpm.  Pt remains intubated and sedated.      PMHx:   Ascending aortic aneurysm    ASHD (arteriosclerotic heart disease)    HFrEF (heart failure with reduced ejection fraction)    Gout    HLD (hyperlipidemia)    Colon carcinoma    CAD (coronary artery disease)    HTN (Hypertension)        PSHx:   Incisional hernia    History of reversal of ileostomy    S/P coronary artery stent placement    S/P colon resection        Allergies:  No Known Allergies      Home Meds:    Current Medications:   aMIOdarone Infusion 1 mG/Min IV Continuous <Continuous>  cefuroxime  IVPB 1500 milliGRAM(s) IV Intermittent every 8 hours  chlorhexidine 0.12% Liquid 15 milliLiter(s) Oral Mucosa every 12 hours  chlorhexidine 2% Cloths 1 Application(s) Topical <User Schedule>  dextrose 50% Injectable 50 milliLiter(s) IV Push every 15 minutes  dextrose 50% Injectable 25 milliLiter(s) IV Push every 15 minutes  DOBUTamine Infusion 7.5 MICROgram(s)/kG/Min IV Continuous <Continuous>  insulin regular Infusion 3 Unit(s)/Hr IV Continuous <Continuous>  meperidine     Injectable 25 milliGRAM(s) IV Push once  milrinone Infusion 0.2 MICROgram(s)/kG/Min IV Continuous <Continuous>  norepinephrine Infusion 0.1 MICROgram(s)/kG/Min IV Continuous <Continuous>  pantoprazole  Injectable 40 milliGRAM(s) IV Push daily  polyethylene glycol 3350 17 Gram(s) Oral daily  potassium chloride  10 mEq/50 mL IVPB 10 milliEquivalent(s) IV Intermittent every 1 hour  potassium chloride  10 mEq/50 mL IVPB 10 milliEquivalent(s) IV Intermittent every 1 hour  potassium chloride  10 mEq/50 mL IVPB 10 milliEquivalent(s) IV Intermittent every 1 hour  propofol Infusion 10 MICROgram(s)/kG/Min IV Continuous <Continuous>  sodium chloride 0.9%. 1000 milliLiter(s) IV Continuous <Continuous>  vasopressin Infusion 0.04 Unit(s)/Min IV Continuous <Continuous>      FAMILY HISTORY:  Family history of MI (myocardial infarction) (Father)    Family history of cerebrovascular accident (CVA) (Sibling)      Physical Exam:  T(F): 100 (12-29), Max: 100 (12-29)  HR: 89 (12-29) (59 - 116)  BP: --  RR: 18 (12-29)  SpO2: 96% (12-29)    Cardiovascular Diagnostic Testing:    ECG: Personally reviewed:    Echo: Personally reviewed:    Stress Testing:    Cath:    Imaging:    CXR: Personally reviewed    Labs: Personally reviewed                        10.0   10.34 )-----------( 101      ( 29 Dec 2020 00:45 )             31.0     12-29    140  |  106  |  16  ----------------------------<  174<H>  4.5   |  19<L>  |  0.96    Ca    8.6      29 Dec 2020 00:45  Phos  0.8     12-29  Mg     3.1     12-29    TPro  4.9<L>  /  Alb  3.8  /  TBili  1.4<H>  /  DBili  x   /  AST  76<H>  /  ALT  27  /  AlkPhos  29<L>  12-29    PT/INR - ( 28 Dec 2020 16:34 )   PT: 12.2 sec;   INR: 1.02 ratio         PTT - ( 28 Dec 2020 16:34 )  PTT:26.2 sec        Thyroid Stimulating Hormone, Serum: 1.60 uIU/mL (12-28 @ 20:29)

## 2020-12-29 NOTE — PROCEDURE NOTE - NSICDXPROCEDURE_GEN_ALL_CORE_FT
PROCEDURES:  Insertion, temporary transvenous pacing electrode lead 29-Dec-2020 13:49:16  Sriram Rollins

## 2020-12-29 NOTE — PROGRESS NOTE ADULT - SUBJECTIVE AND OBJECTIVE BOX
CRITICAL CARE ATTENDING - CTICU    MEDICATIONS  (STANDING):  aMIOdarone Infusion 1 mG/Min (33.3 mL/Hr) IV Continuous <Continuous>  cefuroxime  IVPB 1500 milliGRAM(s) IV Intermittent every 8 hours  chlorhexidine 0.12% Liquid 15 milliLiter(s) Oral Mucosa every 12 hours  chlorhexidine 2% Cloths 1 Application(s) Topical <User Schedule>  dextrose 50% Injectable 50 milliLiter(s) IV Push every 15 minutes  dextrose 50% Injectable 25 milliLiter(s) IV Push every 15 minutes  DOBUTamine Infusion 7.5 MICROgram(s)/kG/Min (26.4 mL/Hr) IV Continuous <Continuous>  insulin regular Infusion 3 Unit(s)/Hr (3 mL/Hr) IV Continuous <Continuous>  meperidine     Injectable 25 milliGRAM(s) IV Push once  milrinone Infusion 0.375 MICROgram(s)/kG/Min (13.2 mL/Hr) IV Continuous <Continuous>  norepinephrine Infusion 0.1 MICROgram(s)/kG/Min (22 mL/Hr) IV Continuous <Continuous>  pantoprazole  Injectable 40 milliGRAM(s) IV Push daily  polyethylene glycol 3350 17 Gram(s) Oral daily  potassium chloride  10 mEq/50 mL IVPB 10 milliEquivalent(s) IV Intermittent every 1 hour  potassium chloride  10 mEq/50 mL IVPB 10 milliEquivalent(s) IV Intermittent every 1 hour  potassium chloride  10 mEq/50 mL IVPB 10 milliEquivalent(s) IV Intermittent every 1 hour  propofol Infusion 10 MICROgram(s)/kG/Min (7.05 mL/Hr) IV Continuous <Continuous>  sodium chloride 0.9%. 1000 milliLiter(s) (10 mL/Hr) IV Continuous <Continuous>  vasopressin Infusion 0.04 Unit(s)/Min (2.4 mL/Hr) IV Continuous <Continuous>                                    10.0   10.34 )-----------( 101      ( 29 Dec 2020 00:45 )             31.0           140  |  106  |  16  ----------------------------<  174<H>  4.5   |  19<L>  |  0.96    Ca    8.6      29 Dec 2020 00:45  Phos  0.8       Mg     3.1         TPro  4.9<L>  /  Alb  3.8  /  TBili  1.4<H>  /  DBili  x   /  AST  76<H>  /  ALT  27  /  AlkPhos  29<L>        PT/INR - ( 28 Dec 2020 16:34 )   PT: 12.2 sec;   INR: 1.02 ratio         PTT - ( 28 Dec 2020 16:34 )  PTT:26.2 sec    Mode: AC/ CMV (Assist Control/ Continuous Mandatory Ventilation)  RR (machine): 18  TV (machine): 700  FiO2: 60  PEEP: 10  ITime: 1  MAP: 14  PIP: 22      Daily     Daily Weight in k.8 (29 Dec 2020 00:00)       @ 07:01  -   @ 07:00  --------------------------------------------------------  IN: 4215.5 mL / OUT: 1685 mL / NET: 2530.5 mL        Critically Ill patient  : [ ] preoperative ,   [x ] post operative    Requires :  [x ] Arterial Line   [x ] Central Line  [ ] PA catheter  [ ] IABP  [ ] ECMO  [ ] LVAD  [x ] Ventilator  [ x] pacemaker [ ] Impella.                      [ x] ABG's     [x ] Pulse Oxymetry Monitoring  Bedside evaluation , monitoring , treatment of hemodynamics , fluids , IVP/ IVCD meds.        Diagnosis:     POD 1 - CABG x3 / Ascending aorta replacement     Chest tube drainage     Requires chest PT, pulmonary toilet, ambu bagging, suctioning to maintain SaO2,  patent airway and treat atelectasis.     Sedated with IVCD Precedex to maintain vent synchrony     Ventilator Management:  [ x]AC-rest    [ ]CPAP-PS Wean    [ ]Trach Collar     [ ]Extubate    [ ] T-Piece  [ ]peep>5     CHF- acute [ x]   chronic [ x]    systolic [ x]   diatolic [ ]          - Echo- EF -  30%        [ ] RV dysfunction          - Cxr-cardiomegally, edema          - Clinical-  [x ]inotropes   [ x]pressors   [ ]diuresis   [ ]IABP   [ ]ECMO   [ ]LVAD   [ ]Respiratory Failure.     Hemodynamic lability,  instability. Requires IVCD [x ] vasopressors [ x] inotropes  [ ] vasodilator  [ ]IVSS fluid  to maintain MAP, perfusion, C.I.     Temporary pacemaker (TPM) interrogation and setting.     Ventricular tachycardia - IVCD Amiodarone     IVCD insulin             By signing my name below, I, Elaina Lazar, attest that this documentation has been prepared under the direction and in the presence of Don Cline MD.   Electronically Signed: Aly Aranda. 12-29-20 @ 08:07    Discussed with CT surgeon, Physician's Assistant - Nurse Practitioner- Critical care medicine team.   Dicussed at  AM / PM rounds.   Chart, labs , films reviewed.    Total Time:  CRITICAL CARE ATTENDING - CTICU    MEDICATIONS  (STANDING):  aMIOdarone Infusion 1 mG/Min (33.3 mL/Hr) IV Continuous <Continuous>  cefuroxime  IVPB 1500 milliGRAM(s) IV Intermittent every 8 hours  chlorhexidine 0.12% Liquid 15 milliLiter(s) Oral Mucosa every 12 hours  chlorhexidine 2% Cloths 1 Application(s) Topical <User Schedule>  dextrose 50% Injectable 50 milliLiter(s) IV Push every 15 minutes  dextrose 50% Injectable 25 milliLiter(s) IV Push every 15 minutes  DOBUTamine Infusion 7.5 MICROgram(s)/kG/Min (26.4 mL/Hr) IV Continuous <Continuous>  insulin regular Infusion 3 Unit(s)/Hr (3 mL/Hr) IV Continuous <Continuous>  meperidine     Injectable 25 milliGRAM(s) IV Push once  milrinone Infusion 0.375 MICROgram(s)/kG/Min (13.2 mL/Hr) IV Continuous <Continuous>  norepinephrine Infusion 0.1 MICROgram(s)/kG/Min (22 mL/Hr) IV Continuous <Continuous>  pantoprazole  Injectable 40 milliGRAM(s) IV Push daily  polyethylene glycol 3350 17 Gram(s) Oral daily  potassium chloride  10 mEq/50 mL IVPB 10 milliEquivalent(s) IV Intermittent every 1 hour  potassium chloride  10 mEq/50 mL IVPB 10 milliEquivalent(s) IV Intermittent every 1 hour  potassium chloride  10 mEq/50 mL IVPB 10 milliEquivalent(s) IV Intermittent every 1 hour  propofol Infusion 10 MICROgram(s)/kG/Min (7.05 mL/Hr) IV Continuous <Continuous>  sodium chloride 0.9%. 1000 milliLiter(s) (10 mL/Hr) IV Continuous <Continuous>  vasopressin Infusion 0.04 Unit(s)/Min (2.4 mL/Hr) IV Continuous <Continuous>                                    10.0   10.34 )-----------( 101      ( 29 Dec 2020 00:45 )             31.0           140  |  106  |  16  ----------------------------<  174<H>  4.5   |  19<L>  |  0.96    Ca    8.6      29 Dec 2020 00:45  Phos  0.8       Mg     3.1         TPro  4.9<L>  /  Alb  3.8  /  TBili  1.4<H>  /  DBili  x   /  AST  76<H>  /  ALT  27  /  AlkPhos  29<L>        PT/INR - ( 28 Dec 2020 16:34 )   PT: 12.2 sec;   INR: 1.02 ratio         PTT - ( 28 Dec 2020 16:34 )  PTT:26.2 sec    Mode: AC/ CMV (Assist Control/ Continuous Mandatory Ventilation)  RR (machine): 18  TV (machine): 700  FiO2: 60  PEEP: 10  ITime: 1  MAP: 14  PIP: 22      Daily     Daily Weight in k.8 (29 Dec 2020 00:00)       @ 07:01  -   @ 07:00  --------------------------------------------------------  IN: 4215.5 mL / OUT: 1685 mL / NET: 2530.5 mL        Critically Ill patient  : [ ] preoperative ,   [x ] post operative    Requires :  [x ] Arterial Line   [x ] Central Line  [x ] PA catheter  [ ] IABP  [ ] ECMO  [ ] LVAD  [x ] Ventilator  [ x] pacemaker [ ] Impella.                      [ x] ABG's     [x ] Pulse Oxymetry Monitoring  Bedside evaluation , monitoring , treatment of hemodynamics , fluids , IVP/ IVCD meds.        Diagnosis:     POD 1 - CABG x3 / Ascending aorta replacement     Post Op Shock    Chest tube drainage     Requires chest PT, pulmonary toilet, ambu bagging, suctioning to maintain SaO2,  patent airway and treat atelectasis.     Sedated with IVCD Precedex to maintain vent synchrony     respiratory failure     Hypoxia    Ventilator Management:  [ x]AC-rest    [ ]CPAP-PS Wean    [ ]Trach Collar     [ ]Extubate    [ ] T-Piece  [ x]peep>5     CHF- acute [ x]   chronic [ x]    systolic [ x]   diatolic [ ]          - Echo- EF -  30%        [ ] RV dysfunction          - Cxr-cardiomegally, edema          - Clinical-  [x ]inotropes   [ x]pressors   [ ]diuresis   [ ]IABP   [ ]ECMO   [ ]LVAD   [ ]Respiratory Failure.     Hemodynamic lability,  instability. Requires IVCD [x ] vasopressors [ x] inotropes  [ ] vasodilator  [ ]IVSS fluid  to maintain MAP, perfusion, C.I.     Temporary pacemaker (TPM) interrogation and s    IVCD insulin     Thrombocytopenia     Bradycardia    Requires  [ ]DDD  [ x]VVI    [ ]AII  temporary pacing at 90 min       to maintain HR, MAP, CI, and perfusion.     Present, assisting, supervising:  L subclavian pacing wire    Lactic acidosis                By signing my name below, I, Elaina Lazar, attest that this documentation has been prepared under the direction and in the presence of Don Cline MD.   Electronically Signed: Aly Aranda. 20 @ 08:07    I, Don Cline, personally performed the services described in this documentation. All medical record entries made by the scribe were at my direction and in my presence. I have reviewed the chart and agree that the record reflects my personal performance and is accurate and complete.   Don Cline MD.     Discussed with CT surgeon, Physician's Assistant - Nurse Practitioner- Critical care medicine team.   Dicussed at  AM / PM rounds.   Chart, labs , films reviewed.    Total Time: 90 min

## 2020-12-29 NOTE — CONSULT NOTE ADULT - ASSESSMENT
76 yo M POD 1 s/p CABG and ascending aortic aneurysm repair. EP called for epicardial leads not pacing. TVP now placed with threshold at 6 and capturing.    Incomplete 74 yo M POD 1 s/p CABG and ascending aortic aneurysm repair. EP called for epicardial leads not pacing. TVP now placed with threshold at 6 and capturing.    -will continue to follow

## 2020-12-30 LAB
ALBUMIN SERPL ELPH-MCNC: 3.7 G/DL — SIGNIFICANT CHANGE UP (ref 3.3–5)
ALP SERPL-CCNC: 32 U/L — LOW (ref 40–120)
ALT FLD-CCNC: 29 U/L — SIGNIFICANT CHANGE UP (ref 10–45)
ANION GAP SERPL CALC-SCNC: 8 MMOL/L — SIGNIFICANT CHANGE UP (ref 5–17)
AST SERPL-CCNC: 56 U/L — HIGH (ref 10–40)
BASE EXCESS BLDMV CALC-SCNC: 2.4 MMOL/L — SIGNIFICANT CHANGE UP (ref -3–3)
BASE EXCESS BLDMV CALC-SCNC: 2.5 MMOL/L — SIGNIFICANT CHANGE UP (ref -3–3)
BASE EXCESS BLDMV CALC-SCNC: 3.6 MMOL/L — HIGH (ref -3–3)
BASE EXCESS BLDMV CALC-SCNC: 4.5 MMOL/L — HIGH (ref -3–3)
BILIRUB SERPL-MCNC: 0.5 MG/DL — SIGNIFICANT CHANGE UP (ref 0.2–1.2)
BUN SERPL-MCNC: 20 MG/DL — SIGNIFICANT CHANGE UP (ref 7–23)
CALCIUM SERPL-MCNC: 8.5 MG/DL — SIGNIFICANT CHANGE UP (ref 8.4–10.5)
CHLORIDE SERPL-SCNC: 105 MMOL/L — SIGNIFICANT CHANGE UP (ref 96–108)
CO2 BLDMV-SCNC: 27 MMOL/L — SIGNIFICANT CHANGE UP (ref 21–29)
CO2 BLDMV-SCNC: 28 MMOL/L — SIGNIFICANT CHANGE UP (ref 21–29)
CO2 BLDMV-SCNC: 29 MMOL/L — SIGNIFICANT CHANGE UP (ref 21–29)
CO2 BLDMV-SCNC: 30 MMOL/L — HIGH (ref 21–29)
CO2 SERPL-SCNC: 23 MMOL/L — SIGNIFICANT CHANGE UP (ref 22–31)
CREAT SERPL-MCNC: 0.92 MG/DL — SIGNIFICANT CHANGE UP (ref 0.5–1.3)
GAS PNL BLDA: SIGNIFICANT CHANGE UP
GAS PNL BLDMV: SIGNIFICANT CHANGE UP
GLUCOSE BLDC GLUCOMTR-MCNC: 110 MG/DL — HIGH (ref 70–99)
GLUCOSE BLDC GLUCOMTR-MCNC: 115 MG/DL — HIGH (ref 70–99)
GLUCOSE BLDC GLUCOMTR-MCNC: 121 MG/DL — HIGH (ref 70–99)
GLUCOSE BLDC GLUCOMTR-MCNC: 122 MG/DL — HIGH (ref 70–99)
GLUCOSE BLDC GLUCOMTR-MCNC: 123 MG/DL — HIGH (ref 70–99)
GLUCOSE BLDC GLUCOMTR-MCNC: 125 MG/DL — HIGH (ref 70–99)
GLUCOSE BLDC GLUCOMTR-MCNC: 126 MG/DL — HIGH (ref 70–99)
GLUCOSE BLDC GLUCOMTR-MCNC: 126 MG/DL — HIGH (ref 70–99)
GLUCOSE BLDC GLUCOMTR-MCNC: 127 MG/DL — HIGH (ref 70–99)
GLUCOSE BLDC GLUCOMTR-MCNC: 128 MG/DL — HIGH (ref 70–99)
GLUCOSE BLDC GLUCOMTR-MCNC: 136 MG/DL — HIGH (ref 70–99)
GLUCOSE BLDC GLUCOMTR-MCNC: 149 MG/DL — HIGH (ref 70–99)
GLUCOSE SERPL-MCNC: 139 MG/DL — HIGH (ref 70–99)
HCO3 BLDMV-SCNC: 26 MMOL/L — SIGNIFICANT CHANGE UP (ref 20–28)
HCO3 BLDMV-SCNC: 27 MMOL/L — SIGNIFICANT CHANGE UP (ref 20–28)
HCO3 BLDMV-SCNC: 27 MMOL/L — SIGNIFICANT CHANGE UP (ref 20–28)
HCO3 BLDMV-SCNC: 28 MMOL/L — SIGNIFICANT CHANGE UP (ref 20–28)
HCT VFR BLD CALC: 28.7 % — LOW (ref 39–50)
HEPARIN-PF4 AB RESULT: <0.6 U/ML — SIGNIFICANT CHANGE UP (ref 0–0.9)
HGB BLD-MCNC: 9.4 G/DL — LOW (ref 13–17)
HOROWITZ INDEX BLDMV+IHG-RTO: 36 — SIGNIFICANT CHANGE UP
HOROWITZ INDEX BLDMV+IHG-RTO: 36 — SIGNIFICANT CHANGE UP
HOROWITZ INDEX BLDMV+IHG-RTO: 40 — SIGNIFICANT CHANGE UP
HOROWITZ INDEX BLDMV+IHG-RTO: 44 — SIGNIFICANT CHANGE UP
MAGNESIUM SERPL-MCNC: 2.9 MG/DL — HIGH (ref 1.6–2.6)
MCHC RBC-ENTMCNC: 29.5 PG — SIGNIFICANT CHANGE UP (ref 27–34)
MCHC RBC-ENTMCNC: 32.8 GM/DL — SIGNIFICANT CHANGE UP (ref 32–36)
MCV RBC AUTO: 90 FL — SIGNIFICANT CHANGE UP (ref 80–100)
NRBC # BLD: 0 /100 WBCS — SIGNIFICANT CHANGE UP (ref 0–0)
O2 CT VFR BLD CALC: 32 MMHG — SIGNIFICANT CHANGE UP (ref 30–65)
O2 CT VFR BLD CALC: 33 MMHG — SIGNIFICANT CHANGE UP (ref 30–65)
O2 CT VFR BLD CALC: 33 MMHG — SIGNIFICANT CHANGE UP (ref 30–65)
O2 CT VFR BLD CALC: 34 MMHG — SIGNIFICANT CHANGE UP (ref 30–65)
PCO2 BLDMV: 38 MMHG — SIGNIFICANT CHANGE UP (ref 30–65)
PCO2 BLDMV: 39 MMHG — SIGNIFICANT CHANGE UP (ref 30–65)
PCO2 BLDMV: 40 MMHG — SIGNIFICANT CHANGE UP (ref 30–65)
PCO2 BLDMV: 41 MMHG — SIGNIFICANT CHANGE UP (ref 30–65)
PCO2 BLDMV: 42 MMHG — SIGNIFICANT CHANGE UP (ref 30–65)
PCO2 BLDMV: 43 MMHG — SIGNIFICANT CHANGE UP (ref 30–65)
PF4 HEPARIN CMPLX AB SER-ACNC: NEGATIVE — SIGNIFICANT CHANGE UP
PH BLDMV: 7.41 — SIGNIFICANT CHANGE UP (ref 7.32–7.45)
PH BLDMV: 7.43 — SIGNIFICANT CHANGE UP (ref 7.32–7.45)
PH BLDMV: 7.44 — SIGNIFICANT CHANGE UP (ref 7.32–7.45)
PH BLDMV: 7.45 — SIGNIFICANT CHANGE UP (ref 7.32–7.45)
PHOSPHATE SERPL-MCNC: 3 MG/DL — SIGNIFICANT CHANGE UP (ref 2.5–4.5)
PLATELET # BLD AUTO: 76 K/UL — LOW (ref 150–400)
POTASSIUM SERPL-MCNC: 4.5 MMOL/L — SIGNIFICANT CHANGE UP (ref 3.5–5.3)
POTASSIUM SERPL-SCNC: 4.5 MMOL/L — SIGNIFICANT CHANGE UP (ref 3.5–5.3)
PROT SERPL-MCNC: 5.1 G/DL — LOW (ref 6–8.3)
RBC # BLD: 3.19 M/UL — LOW (ref 4.2–5.8)
RBC # FLD: 14.6 % — HIGH (ref 10.3–14.5)
SAO2 % BLDMV: 58 % — LOW (ref 60–90)
SAO2 % BLDMV: 62 % — SIGNIFICANT CHANGE UP (ref 60–90)
SAO2 % BLDMV: 63 % — SIGNIFICANT CHANGE UP (ref 60–90)
SAO2 % BLDMV: 64 % — SIGNIFICANT CHANGE UP (ref 60–90)
SODIUM SERPL-SCNC: 136 MMOL/L — SIGNIFICANT CHANGE UP (ref 135–145)
SURGICAL PATHOLOGY STUDY: SIGNIFICANT CHANGE UP
WBC # BLD: 16.11 K/UL — HIGH (ref 3.8–10.5)
WBC # FLD AUTO: 16.11 K/UL — HIGH (ref 3.8–10.5)

## 2020-12-30 PROCEDURE — 93321 DOPPLER ECHO F-UP/LMTD STD: CPT | Mod: 26

## 2020-12-30 PROCEDURE — 33249 INSJ/RPLCMT DEFIB W/LEAD(S): CPT

## 2020-12-30 PROCEDURE — 99291 CRITICAL CARE FIRST HOUR: CPT

## 2020-12-30 PROCEDURE — 71045 X-RAY EXAM CHEST 1 VIEW: CPT | Mod: 26

## 2020-12-30 PROCEDURE — 93308 TTE F-UP OR LMTD: CPT | Mod: 26

## 2020-12-30 PROCEDURE — 93010 ELECTROCARDIOGRAM REPORT: CPT

## 2020-12-30 PROCEDURE — 99233 SBSQ HOSP IP/OBS HIGH 50: CPT

## 2020-12-30 RX ORDER — CEFAZOLIN SODIUM 1 G
2000 VIAL (EA) INJECTION ONCE
Refills: 0 | Status: COMPLETED | OUTPATIENT
Start: 2020-12-30 | End: 2020-12-30

## 2020-12-30 RX ORDER — MAGNESIUM SULFATE 500 MG/ML
2 VIAL (ML) INJECTION ONCE
Refills: 0 | Status: COMPLETED | OUTPATIENT
Start: 2020-12-30 | End: 2020-12-30

## 2020-12-30 RX ORDER — HYDROMORPHONE HYDROCHLORIDE 2 MG/ML
0.5 INJECTION INTRAMUSCULAR; INTRAVENOUS; SUBCUTANEOUS ONCE
Refills: 0 | Status: DISCONTINUED | OUTPATIENT
Start: 2020-12-30 | End: 2020-12-30

## 2020-12-30 RX ORDER — LIDOCAINE HCL 20 MG/ML
1 VIAL (ML) INJECTION
Qty: 2 | Refills: 0 | Status: DISCONTINUED | OUTPATIENT
Start: 2020-12-30 | End: 2020-12-30

## 2020-12-30 RX ORDER — POTASSIUM CHLORIDE 20 MEQ
10 PACKET (EA) ORAL
Refills: 0 | Status: COMPLETED | OUTPATIENT
Start: 2020-12-30 | End: 2020-12-30

## 2020-12-30 RX ORDER — LIDOCAINE HCL 20 MG/ML
100 VIAL (ML) INJECTION ONCE
Refills: 0 | Status: COMPLETED | OUTPATIENT
Start: 2020-12-30 | End: 2020-12-30

## 2020-12-30 RX ORDER — FUROSEMIDE 40 MG
20 TABLET ORAL DAILY
Refills: 0 | Status: DISCONTINUED | OUTPATIENT
Start: 2020-12-30 | End: 2020-12-31

## 2020-12-30 RX ORDER — FUROSEMIDE 40 MG
20 TABLET ORAL ONCE
Refills: 0 | Status: COMPLETED | OUTPATIENT
Start: 2020-12-30 | End: 2020-12-30

## 2020-12-30 RX ORDER — SPIRONOLACTONE 25 MG/1
25 TABLET, FILM COATED ORAL DAILY
Refills: 0 | Status: DISCONTINUED | OUTPATIENT
Start: 2020-12-30 | End: 2021-01-01

## 2020-12-30 RX ORDER — POTASSIUM CHLORIDE 20 MEQ
20 PACKET (EA) ORAL ONCE
Refills: 0 | Status: COMPLETED | OUTPATIENT
Start: 2020-12-30 | End: 2020-12-30

## 2020-12-30 RX ORDER — CEFAZOLIN SODIUM 1 G
1000 VIAL (EA) INJECTION ONCE
Refills: 0 | Status: DISCONTINUED | OUTPATIENT
Start: 2020-12-30 | End: 2020-12-30

## 2020-12-30 RX ADMIN — Medication 7.5 MG/MIN: at 06:17

## 2020-12-30 RX ADMIN — HYDROMORPHONE HYDROCHLORIDE 0.5 MILLIGRAM(S): 2 INJECTION INTRAMUSCULAR; INTRAVENOUS; SUBCUTANEOUS at 22:30

## 2020-12-30 RX ADMIN — HYDROMORPHONE HYDROCHLORIDE 0.5 MILLIGRAM(S): 2 INJECTION INTRAMUSCULAR; INTRAVENOUS; SUBCUTANEOUS at 01:07

## 2020-12-30 RX ADMIN — HYDROMORPHONE HYDROCHLORIDE 0.5 MILLIGRAM(S): 2 INJECTION INTRAMUSCULAR; INTRAVENOUS; SUBCUTANEOUS at 18:15

## 2020-12-30 RX ADMIN — Medication 50 MILLIEQUIVALENT(S): at 18:45

## 2020-12-30 RX ADMIN — Medication 20 MILLIGRAM(S): at 08:05

## 2020-12-30 RX ADMIN — HYDROMORPHONE HYDROCHLORIDE 0.5 MILLIGRAM(S): 2 INJECTION INTRAMUSCULAR; INTRAVENOUS; SUBCUTANEOUS at 01:22

## 2020-12-30 RX ADMIN — Medication 20 MILLIGRAM(S): at 15:19

## 2020-12-30 RX ADMIN — MILRINONE LACTATE 7.05 MICROGRAM(S)/KG/MIN: 1 INJECTION, SOLUTION INTRAVENOUS at 06:17

## 2020-12-30 RX ADMIN — Medication 100 MILLIGRAM(S): at 03:23

## 2020-12-30 RX ADMIN — PANTOPRAZOLE SODIUM 40 MILLIGRAM(S): 20 TABLET, DELAYED RELEASE ORAL at 14:59

## 2020-12-30 RX ADMIN — INSULIN HUMAN 3 UNIT(S)/HR: 100 INJECTION, SOLUTION SUBCUTANEOUS at 06:18

## 2020-12-30 RX ADMIN — VASOPRESSIN 2.4 UNIT(S)/MIN: 20 INJECTION INTRAVENOUS at 06:18

## 2020-12-30 RX ADMIN — Medication 100 MILLIGRAM(S): at 17:58

## 2020-12-30 RX ADMIN — Medication 17.6 MICROGRAM(S)/KG/MIN: at 06:18

## 2020-12-30 RX ADMIN — Medication 50 MILLIEQUIVALENT(S): at 20:00

## 2020-12-30 RX ADMIN — HYDROMORPHONE HYDROCHLORIDE 0.5 MILLIGRAM(S): 2 INJECTION INTRAMUSCULAR; INTRAVENOUS; SUBCUTANEOUS at 23:00

## 2020-12-30 RX ADMIN — SPIRONOLACTONE 25 MILLIGRAM(S): 25 TABLET, FILM COATED ORAL at 15:00

## 2020-12-30 RX ADMIN — HYDROMORPHONE HYDROCHLORIDE 0.5 MILLIGRAM(S): 2 INJECTION INTRAMUSCULAR; INTRAVENOUS; SUBCUTANEOUS at 18:00

## 2020-12-30 RX ADMIN — Medication 50 GRAM(S): at 04:00

## 2020-12-30 RX ADMIN — Medication 20 MILLIEQUIVALENT(S): at 18:41

## 2020-12-30 RX ADMIN — CHLORHEXIDINE GLUCONATE 1 APPLICATION(S): 213 SOLUTION TOPICAL at 05:16

## 2020-12-30 NOTE — PROGRESS NOTE ADULT - ASSESSMENT
76 yo M POD 2 s/p CABG and ascending aortic aneurysm repair. EP called for epicardial leads not pacing. TVP now placed with threshold at 2.5 and capturing. Runs of VT overnight on lido.  -obtain stat TTE to evaluate for EF.   -plan for device today   -Keep NPO  -ensure active T&S 76 yo M POD 2 s/p CABG and ascending aortic aneurysm repair. EP called for epicardial leads not pacing. TVP now placed with threshold at 2.5 and capturing. Runs of VT overnight on lido.  -TTE reviewed  -plan for Bi-V ICD today  -Keep NPO  -ensure active T&S 76 yo M POD 2 s/p CABG and ascending aortic aneurysm repair. EP called for epicardial leads not pacing. TVP now placed with threshold at 2.5 and capturing. Runs of VT overnight on lido.  -TTE reviewed with severe global LV dysfunction with poorly visualized endocardium. Although patient is POD 2 post CABG, given that the patient had CHB prior to the procedure and is now having several intermittent runs of prolonged ventricular tachycardia, an BiV ICD is indicated.  -plan for Bi-V ICD today  -Keep NPO  -ensure active T&S

## 2020-12-30 NOTE — PROGRESS NOTE ADULT - SUBJECTIVE AND OBJECTIVE BOX
CRITICAL CARE ATTENDING - CTICU    MEDICATIONS  (STANDING):  chlorhexidine 2% Cloths 1 Application(s) Topical <User Schedule>  dextrose 50% Injectable 50 milliLiter(s) IV Push every 15 minutes  dextrose 50% Injectable 25 milliLiter(s) IV Push every 15 minutes  DOBUTamine Infusion 5 MICROgram(s)/kG/Min (17.6 mL/Hr) IV Continuous <Continuous>  insulin regular Infusion 3 Unit(s)/Hr (3 mL/Hr) IV Continuous <Continuous>  lidocaine   Infusion 1 mG/Min (7.5 mL/Hr) IV Continuous <Continuous>  meperidine     Injectable 25 milliGRAM(s) IV Push once  milrinone Infusion 0.2 MICROgram(s)/kG/Min (7.05 mL/Hr) IV Continuous <Continuous>  norepinephrine Infusion 0.1 MICROgram(s)/kG/Min (22 mL/Hr) IV Continuous <Continuous>  pantoprazole  Injectable 40 milliGRAM(s) IV Push daily  polyethylene glycol 3350 17 Gram(s) Oral daily  potassium chloride  10 mEq/50 mL IVPB 10 milliEquivalent(s) IV Intermittent every 1 hour  potassium chloride  10 mEq/50 mL IVPB 10 milliEquivalent(s) IV Intermittent every 1 hour  potassium chloride  10 mEq/50 mL IVPB 10 milliEquivalent(s) IV Intermittent every 1 hour  sodium chloride 0.9%. 1000 milliLiter(s) (10 mL/Hr) IV Continuous <Continuous>  vasopressin Infusion 0.04 Unit(s)/Min (2.4 mL/Hr) IV Continuous <Continuous>                                    9.4    16.11 )-----------( 76       ( 30 Dec 2020 00:30 )             28.7       12-30    136  |  105  |  20  ----------------------------<  139<H>  4.5   |  23  |  0.92    Ca    8.5      30 Dec 2020 00:30  Phos  3.0     12-30  Mg     2.9     12-30    TPro  5.1<L>  /  Alb  3.7  /  TBili  0.5  /  DBili  x   /  AST  56<H>  /  ALT  29  /  AlkPhos  32<L>  12-30      PT/INR - ( 28 Dec 2020 16:34 )   PT: 12.2 sec;   INR: 1.02 ratio         PTT - ( 28 Dec 2020 16:34 )  PTT:26.2 sec        Daily     Daily Weight in k.3 (30 Dec 2020 00:00)       @ 07:01  -   @ 07:00  --------------------------------------------------------  IN: 2036.9 mL / OUT: 1700 mL / NET: 336.9 mL          Critically Ill patient  : [ ] preoperative ,   [x ] post operative    Requires :  [x ] Arterial Line   [x ] Central Line  [x ] PA catheter  [ ] IABP  [ ] ECMO  [ ] LVAD  [ ] Ventilator  [ x] pacemaker [ ] Impella.                      [ x] ABG's     [x ] Pulse Oxymetry Monitoring  Bedside evaluation , monitoring , treatment of hemodynamics , fluids , IVP/ IVCD meds.        Diagnosis:     POD 2 - CABG x3 / Ascending aorta replacement     Post Op Shock    Chest tube drainage     Requires chest PT, pulmonary toilet, suctioning to maintain SaO2,  patent airway and treat atelectasis.     respiratory failure     Hypoxia    CHF- acute [ x]   chronic [ x]    systolic [ x]   diatolic [ ]          - Echo- EF -  30%        [ ] RV dysfunction          - Cxr-cardiomegally, edema          - Clinical-  [x ]inotropes   [ x]pressors   [ ]diuresis   [ ]IABP   [ ]ECMO   [ ]LVAD   [ ]Respiratory Failure.     Hemodynamic lability,  instability. Requires IVCD [x ] vasopressors [ x] inotropes  [ ] vasodilator  [ ]IVSS fluid  to maintain MAP, perfusion, C.I.     Temporary pacemaker (TPM) interrogation and setting.     Hypotension     IVCD insulin     Thrombocytopenia     Bradycardia    Requires  [ ]DDD  [ x]VVI    [ ]AII  temporary pacing at 90 min       to maintain HR, MAP, CI, and perfusion.     Present, assisting, supervising:  L subclavian pacing wire    Lactic acidosis    Vtach overnight     IVCD Lidocaine                 By signing my name below, I, Elaina Lazar, attest that this documentation has been prepared under the direction and in the presence of Don Cline MD.   Electronically Signed: Aly Aranda. 20 @ 07:53    Discussed with CT surgeon, Physician's Assistant - Nurse Practitioner- Critical care medicine team.   Dicussed at  AM / PM rounds.   Chart, labs , films reviewed.    Total Time:  CRITICAL CARE ATTENDING - CTICU    MEDICATIONS  (STANDING):  chlorhexidine 2% Cloths 1 Application(s) Topical <User Schedule>  dextrose 50% Injectable 50 milliLiter(s) IV Push every 15 minutes  dextrose 50% Injectable 25 milliLiter(s) IV Push every 15 minutes  DOBUTamine Infusion 5 MICROgram(s)/kG/Min (17.6 mL/Hr) IV Continuous <Continuous>  insulin regular Infusion 3 Unit(s)/Hr (3 mL/Hr) IV Continuous <Continuous>  lidocaine   Infusion 1 mG/Min (7.5 mL/Hr) IV Continuous <Continuous>  meperidine     Injectable 25 milliGRAM(s) IV Push once  milrinone Infusion 0.2 MICROgram(s)/kG/Min (7.05 mL/Hr) IV Continuous <Continuous>  norepinephrine Infusion 0.1 MICROgram(s)/kG/Min (22 mL/Hr) IV Continuous <Continuous>  pantoprazole  Injectable 40 milliGRAM(s) IV Push daily  polyethylene glycol 3350 17 Gram(s) Oral daily  potassium chloride  10 mEq/50 mL IVPB 10 milliEquivalent(s) IV Intermittent every 1 hour  potassium chloride  10 mEq/50 mL IVPB 10 milliEquivalent(s) IV Intermittent every 1 hour  potassium chloride  10 mEq/50 mL IVPB 10 milliEquivalent(s) IV Intermittent every 1 hour  sodium chloride 0.9%. 1000 milliLiter(s) (10 mL/Hr) IV Continuous <Continuous>  vasopressin Infusion 0.04 Unit(s)/Min (2.4 mL/Hr) IV Continuous <Continuous>                                    9.4    16.11 )-----------( 76       ( 30 Dec 2020 00:30 )             28.7       12-30    136  |  105  |  20  ----------------------------<  139<H>  4.5   |  23  |  0.92    Ca    8.5      30 Dec 2020 00:30  Phos  3.0     12-30  Mg     2.9     12-30    TPro  5.1<L>  /  Alb  3.7  /  TBili  0.5  /  DBili  x   /  AST  56<H>  /  ALT  29  /  AlkPhos  32<L>  12-30      PT/INR - ( 28 Dec 2020 16:34 )   PT: 12.2 sec;   INR: 1.02 ratio         PTT - ( 28 Dec 2020 16:34 )  PTT:26.2 sec        Daily     Daily Weight in k.3 (30 Dec 2020 00:00)       @ 07:01  -   @ 07:00  --------------------------------------------------------  IN: 2036.9 mL / OUT: 1700 mL / NET: 336.9 mL          Critically Ill patient  : [ ] preoperative ,   [x ] post operative    Requires :  [x ] Arterial Line   [x ] Central Line  [x ] PA catheter  [ ] IABP  [ ] ECMO  [ ] LVAD  [ ] Ventilator  [ x] pacemaker [ ] Impella.                      [ x] ABG's     [x ] Pulse Oxymetry Monitoring  Bedside evaluation , monitoring , treatment of hemodynamics , fluids , IVP/ IVCD meds.        Diagnosis:     POD 2 - CABG x3 / Ascending aorta replacement     Post Op Shock - resolving    Chest tube drainage     Requires chest PT, pulmonary toilet, suctioning to maintain SaO2,  patent airway and treat atelectasis.      Hypoxia    CHF- acute [ x]   chronic [ x]    systolic [ x]   diatolic [ ]          - Echo- EF -  30%        [ ] RV dysfunction          - Cxr-cardiomegally, edema          - Clinical-  [x ]inotropes   [ x]pressors   [ ]diuresis   [ ]IABP   [ ]ECMO   [ ]LVAD   [ ]Respiratory Failure.     Hemodynamic lability,  instability. Requires IVCD [x ] vasopressors [ x] inotropes  [ ] vasodilator  [ ]IVSS fluid  to maintain MAP, perfusion, C.I.     Temporary pacemaker (TPM) interrogation and setting.     Requires  [ ]DDD  [ x]VVI    [ ]AII  temporary pacing at  80 min    to maintain HR, MAP, CI, and perfusion.     Hypotension     IVCD insulin     Thrombocytopenia     Bradycardia    Requires  [ ]DDD  [ x]VVI    [ ]AII  temporary pacing at 90 min       to maintain HR, MAP, CI, and perfusion.     Lactic acidosis - resolved    Vtach overnight     IVCD Lidocaine     AICD / PPM insertion today    Extubated Last Night                By signing my name below, I, Elaina Lazar, attest that this documentation has been prepared under the direction and in the presence of Don Cline MD.   Electronically Signed: Aly Aranda. 20 @ 07:53    I, Don Cline, personally performed the services described in this documentation. All medical record entries made by the scribe were at my direction and in my presence. I have reviewed the chart and agree that the record reflects my personal performance and is accurate and complete.   Don Cline MD.     Discussed with CT surgeon, Physician's Assistant - Nurse Practitioner- Critical care medicine team.   Dicussed at  AM / PM rounds.   Chart, labs , films reviewed.    Total Time: 35 min

## 2020-12-30 NOTE — PROVIDER CONTACT NOTE (CHANGE IN STATUS NOTIFICATION) - SITUATION
VTach on monitor. BP stable. Pt denies dizziness. Rhythm broke without intervention.
VTach on monitor. BP stable. Rhythm broke with no intervention.

## 2020-12-30 NOTE — PROGRESS NOTE ADULT - SUBJECTIVE AND OBJECTIVE BOX
CHARLINE LAFLEUR  MRN-[id number]  Patient is a 75y old  Male who presents with a chief complaint of CAD (30 Dec 2020 07:52)    HPI:  74 y/o M w/ PMH of HTN, HLD, CAD s/p stent in 2010, Colon CA s/p resection + radiation + ileostomy placement and reversal, and an Aortic Ascending aneurysm 4.7cm presents to PST for a CABG x3 and possible Ascending Aortic Aneurysm Repair on 12/28/20. Echocardiogram showed a new decline in LVEF to 29% from 38%. Denies N/V/D, fevers, chills, cough, palpitations, chest pain, syncope, orthopnea, nocturnal paroxysmal dyspnea, edema, cyanosis, heart murmurs, varicosities, phlebitis, and claudication. COVID testing scheduled at [facility] on 12/26/2020.  (21 Dec 2020 10:15)      Surgery/Hospital course:  12/28 S/P CABG x3. Ascending aorta replacement for ascending aorta aneurysm.   12/29 Extubated  12/30 PPM placed    Today/Overnight:  S/p PPM placement    Vital Signs Last 24 Hrs  T(C): 37.5 (30 Dec 2020 20:00), Max: 37.7 (30 Dec 2020 16:00)  T(F): 99.5 (30 Dec 2020 20:00), Max: 99.9 (30 Dec 2020 16:00)  HR: 84 (30 Dec 2020 21:00) (71 - 84)  BP: --  BP(mean): --  RR: 22 (30 Dec 2020 21:00) (11 - 32)  SpO2: 99% (30 Dec 2020 21:00) (94% - 100%)  ============================I/O===========================  I&O's Detail    29 Dec 2020 07:01  -  30 Dec 2020 07:00  --------------------------------------------------------  IN:    DOBUTamine: 554.4 mL    DOBUTamine: 52.5 mL    Insulin: 160 mL    IV PiggyBack: 216.6 mL    IV PiggyBack: 250 mL    Lidocaine: 22.5 mL    Milrinone: 7.1 mL    Milrinone: 163.3 mL    Norepinephrine: 8.8 mL    Propofol: 331.7 mL    sodium chloride 0.9%: 240 mL    Vasopressin: 30 mL  Total IN: 2036.9 mL    OUT:    Amiodarone: 0 mL    Chest Tube (mL): 30 mL    Chest Tube (mL): 0 mL    Chest Tube (mL): 380 mL    Indwelling Catheter - Urethral (mL): 1190 mL    Nasogastric/Oral tube (mL): 100 mL  Total OUT: 1700 mL    Total NET: 336.9 mL      30 Dec 2020 07:01  -  30 Dec 2020 21:59  --------------------------------------------------------  IN:    DOBUTamine: 44 mL    Insulin: 33 mL    IV PiggyBack: 100 mL    Lidocaine: 45 mL    Milrinone: 88.5 mL    Milrinone: 7.1 mL    Milrinone: 7.1 mL    sodium chloride 0.9%: 180 mL  Total IN: 504.7 mL    OUT:    Chest Tube (mL): 170 mL    Indwelling Catheter - Urethral (mL): 2345 mL    Norepinephrine: 0 mL    Vasopressin: 0 mL  Total OUT: 2515 mL    Total NET: -2010.3 mL        ============================ LABS =========================                        9.4    16.11 )-----------( 76       ( 30 Dec 2020 00:30 )             28.7     12-30    136  |  105  |  20  ----------------------------<  139<H>  4.5   |  23  |  0.92    Ca    8.5      30 Dec 2020 00:30  Phos  3.0     12-30  Mg     2.9     12-30    TPro  5.1<L>  /  Alb  3.7  /  TBili  0.5  /  DBili  x   /  AST  56<H>  /  ALT  29  /  AlkPhos  32<L>  12-30    LIVER FUNCTIONS - ( 30 Dec 2020 00:30 )  Alb: 3.7 g/dL / Pro: 5.1 g/dL / ALK PHOS: 32 U/L / ALT: 29 U/L / AST: 56 U/L / GGT: x             ABG - ( 30 Dec 2020 21:04 )  pH, Arterial: 7.47  pH, Blood: x     /  pCO2: 38    /  pO2: 96    / HCO3: 28    / Base Excess: 4.0   /  SaO2: 98                  ======================Micro/Rad/Cardio=================  Telemetry: Reviewed   CXR: Reviewed  Echo: Reviewed  ======================================================  PAST MEDICAL & SURGICAL HISTORY:  Ascending aortic aneurysm  4.7cm    ASHD (arteriosclerotic heart disease)    HFrEF (heart failure with reduced ejection fraction)    Gout    HLD (hyperlipidemia)    Colon carcinoma  Sp Ileostomy and reverasl with chemo/rt (2012)    CAD (coronary artery disease)  SP ptca w stent 5/2010    HTN (Hypertension)    Incisional hernia  2013    History of reversal of ileostomy  2012    S/P coronary artery stent placement  2010 w/ ileostomy    S/P colon resection      ========================ASSESSMENT ================  CAD S/P CABG x3 on 12/28  Ascending aorta aneurysm S/P Ascending aorta replacement on 12/28  Stress Hyperglycemia  Leukocytosis  Obesity OHS / SUSAN   S/p PPM placement 12/30    Plan:  ====================== NEUROLOGY=====================  Nonfocal, continue to monitor neuro status as per ICU protocol    ==================== RESPIRATORY======================  Extubated yesterday, on supplemental O2 via 5L NC  Encourage incentive spirometry, continue pulse ox monitoring, follow ABGs    ====================CARDIOVASCULAR==================  CAD s/p CABG x3  Ascending aorta aneurysm s/p ascending aorta replacement   S/p PPM placement today  Inotropic support with IV Milrinone infusion  Monitor hemodynamics, telemetry    milrinone Infusion 0.2 MICROgram(s)/kG/Min (7.05 mL/Hr) IV Continuous <Continuous>    ===================HEMATOLOGIC/ONC ===================  Continue close monitoring of hemoglobin, hematocrit and platelet levels    ===================== RENAL =========================  Continue to diurese with IV Lasix, PO Spironolactone  Monitor electrolytes, I/Os, urine output and BUN/Creatinine    furosemide   Injectable 20 milliGRAM(s) IV Push daily  spironolactone 25 milliGRAM(s) Oral daily    ==================== GASTROINTESTINAL===================  Tolerating clear liquid diet, advance diet as tolerated  Continue with Miralax for bowel regimen    GI prophylaxis, pantoprazole  Injectable 40 milliGRAM(s) IV Push daily  polyethylene glycol 3350 17 Gram(s) Oral daily  potassium chloride  10 mEq/50 mL IVPB 10 milliEquivalent(s) IV Intermittent every 1 hour  potassium chloride  10 mEq/50 mL IVPB 10 milliEquivalent(s) IV Intermittent every 1 hour  potassium chloride  10 mEq/50 mL IVPB 10 milliEquivalent(s) IV Intermittent every 1 hour  sodium chloride 0.9%. 1000 milliLiter(s) (10 mL/Hr) IV Continuous <Continuous>    =======================    ENDOCRINE  =====================  Continue glycemic control with IV Insulin infusion for stress hyperglycemia  Monitor glucose levels    dextrose 50% Injectable 50 milliLiter(s) IV Push every 15 minutes  dextrose 50% Injectable 25 milliLiter(s) IV Push every 15 minutes  insulin regular Infusion 3 Unit(s)/Hr (3 mL/Hr) IV Continuous <Continuous>    ========================INFECTIOUS DISEASE================  Temp 99.5F, Leukocytosis with WBC elevated and uptrending at 16.11   Continue to trend fever curve & WBC      Patient requires continuous monitoring with bedside rhythm monitoring, pulse oximetry monitoring, and continuous central venous and arterial pressure monitoring; and intermittent blood gas analysis.  Care plan discussed with ICU care team.    Patient remained critical, at risk for life threatening decompensation.   I have spent 35 minutes providing acute care with multiple re-evaluations throughout the evening.     By signing my name below, I, Alfredo Malloy, attest that this documentation has been prepared under the direction and in the presence of SUZIE Morales.  Electronically signed: Aly Dubon, 12-30-20 @ 21:59    I, SUZIE Morales, personally performed the services described in this documentation. All medical record entries made by the shaynaibe were at my direction and in my presence. I have reviewed the chart and agree that the record reflects my personal performance and is accurate and complete  Electronically signed: SUZIE Morales, 12-30-20 @ 21:59

## 2020-12-30 NOTE — PROGRESS NOTE ADULT - ATTENDING COMMENTS
The patient remains in [facility]  His echo is poor quality but suggests a persistently low EF at 30-35%  Given need for a device, long runs of VT the patient has an indication for BiV ICD despite being post-op from CABG.    The temp wire in the left subclavian will need to be removed so the BiV can be implanted.

## 2020-12-30 NOTE — PROGRESS NOTE ADULT - SUBJECTIVE AND OBJECTIVE BOX
Patient seen and evaluated at bedside    Interval events:  had 5 runs of VT last night (the longest episode being about 1 minute). He received lido bolus and gtt currently on lido @1. Remains on  @5 and milrinone at 0.02. Feels well and has no complaints. TVP in place, threshold at 2.5 and pacing at 70.          Physical Exam:  T(F): 99 (12-30), Max: 99.7 (12-29)  HR: 79 (12-30) (79 - 122)  BP: --  RR: 12 (12-30)  SpO2: 99% (12-30)

## 2020-12-30 NOTE — AIRWAY REMOVAL NOTE  ADULT & PEDS - ARTIFICAL AIRWAY REMOVAL COMMENTS
Written order for extubation verified. The patient was identified by full name and birth date compared to the identification band. MD Campos and TRISTON Peterson was present during the procedure.

## 2020-12-30 NOTE — PROVIDER CONTACT NOTE (CHANGE IN STATUS NOTIFICATION) - ACTION/TREATMENT ORDERED:
100mg Lidocaine IVPB ordered. Continue to monitor.
No interventions at this time. Will continue to monitor.

## 2020-12-30 NOTE — PRE-ANESTHESIA EVALUATION ADULT - NSANTHOSAYNRD_GEN_A_CORE
No. SUSAN screening performed.  STOP BANG Legend: 0-2 = LOW Risk; 3-4 = INTERMEDIATE Risk; 5-8 = HIGH Risk
No. SUSAN screening performed.  STOP BANG Legend: 0-2 = LOW Risk; 3-4 = INTERMEDIATE Risk; 5-8 = HIGH Risk

## 2020-12-30 NOTE — PROVIDER CONTACT NOTE (CHANGE IN STATUS NOTIFICATION) - BACKGROUND
s/p C3L, ascending aortic aneursym repair from 12/28. s/p TVP wire placement today for complete heart block.
s/p C3L, ascending aortic aneurysm repair from 12/28. s/p TVP wire placement from today. previous runs VTach throughout shift.

## 2020-12-30 NOTE — PROCEDURE NOTE - ADDITIONAL PROCEDURE DETAILS
Called to assess patient in the setting of symptomatic left sided diaphragmatic capture - noted to be BiV pacing, reprogrammed to RV pacing, DDD, with resolution of diaphragmatic capture, currently a sensed, v paced at 80. Called to assess patient in the setting of symptomatic left sided diaphragmatic capture - noted to be BiV pacing, reprogrammed to RV pacing, DDD, with resolution of diaphragmatic capture, currently a sensed, v paced at 80.  will re-evavuate and find appropriate vector in AM

## 2020-12-30 NOTE — PROVIDER CONTACT NOTE (CHANGE IN STATUS NOTIFICATION) - ASSESSMENT
VTach on monitor rate 130s. BP Stable, SBP 120s MAP 70s. Pt alert and following commands. Rhythm broke with no intervention, returned to paced @ 80bpm.
VTach 130s on monitor. BP stable, SBP 110s. Pt denies dizziness, states that he is "okay." Rhythm broke without intervention, returned to paced @ 80bpm.

## 2020-12-31 LAB
ALBUMIN SERPL ELPH-MCNC: 3.4 G/DL — SIGNIFICANT CHANGE UP (ref 3.3–5)
ALP SERPL-CCNC: 39 U/L — LOW (ref 40–120)
ALT FLD-CCNC: 23 U/L — SIGNIFICANT CHANGE UP (ref 10–45)
ANION GAP SERPL CALC-SCNC: 9 MMOL/L — SIGNIFICANT CHANGE UP (ref 5–17)
AST SERPL-CCNC: 31 U/L — SIGNIFICANT CHANGE UP (ref 10–40)
BASE EXCESS BLDMV CALC-SCNC: 4.6 MMOL/L — HIGH (ref -3–3)
BASE EXCESS BLDMV CALC-SCNC: 4.9 MMOL/L — HIGH (ref -3–3)
BASE EXCESS BLDV CALC-SCNC: 6.2 MMOL/L — HIGH (ref -2–2)
BILIRUB SERPL-MCNC: 0.6 MG/DL — SIGNIFICANT CHANGE UP (ref 0.2–1.2)
BLD GP AB SCN SERPL QL: NEGATIVE — SIGNIFICANT CHANGE UP
BUN SERPL-MCNC: 18 MG/DL — SIGNIFICANT CHANGE UP (ref 7–23)
CALCIUM SERPL-MCNC: 8.4 MG/DL — SIGNIFICANT CHANGE UP (ref 8.4–10.5)
CHLORIDE SERPL-SCNC: 104 MMOL/L — SIGNIFICANT CHANGE UP (ref 96–108)
CO2 BLDMV-SCNC: 30 MMOL/L — HIGH (ref 21–29)
CO2 BLDMV-SCNC: 31 MMOL/L — HIGH (ref 21–29)
CO2 BLDV-SCNC: 32 MMOL/L — HIGH (ref 22–30)
CO2 SERPL-SCNC: 25 MMOL/L — SIGNIFICANT CHANGE UP (ref 22–31)
CREAT SERPL-MCNC: 0.84 MG/DL — SIGNIFICANT CHANGE UP (ref 0.5–1.3)
GAS PNL BLDA: SIGNIFICANT CHANGE UP
GAS PNL BLDMV: SIGNIFICANT CHANGE UP
GAS PNL BLDMV: SIGNIFICANT CHANGE UP
GAS PNL BLDV: SIGNIFICANT CHANGE UP
GLUCOSE BLDC GLUCOMTR-MCNC: 110 MG/DL — HIGH (ref 70–99)
GLUCOSE BLDC GLUCOMTR-MCNC: 113 MG/DL — HIGH (ref 70–99)
GLUCOSE BLDC GLUCOMTR-MCNC: 118 MG/DL — HIGH (ref 70–99)
GLUCOSE BLDC GLUCOMTR-MCNC: 121 MG/DL — HIGH (ref 70–99)
GLUCOSE BLDC GLUCOMTR-MCNC: 134 MG/DL — HIGH (ref 70–99)
GLUCOSE BLDC GLUCOMTR-MCNC: 142 MG/DL — HIGH (ref 70–99)
GLUCOSE BLDC GLUCOMTR-MCNC: 146 MG/DL — HIGH (ref 70–99)
GLUCOSE SERPL-MCNC: 124 MG/DL — HIGH (ref 70–99)
HCO3 BLDMV-SCNC: 29 MMOL/L — HIGH (ref 20–28)
HCO3 BLDMV-SCNC: 30 MMOL/L — HIGH (ref 20–28)
HCO3 BLDV-SCNC: 30 MMOL/L — HIGH (ref 21–29)
HCT VFR BLD CALC: 27.4 % — LOW (ref 39–50)
HGB BLD-MCNC: 8.7 G/DL — LOW (ref 13–17)
HOROWITZ INDEX BLDMV+IHG-RTO: 40 — SIGNIFICANT CHANGE UP
HOROWITZ INDEX BLDMV+IHG-RTO: 40 — SIGNIFICANT CHANGE UP
HOROWITZ INDEX BLDV+IHG-RTO: 36 — SIGNIFICANT CHANGE UP
MAGNESIUM SERPL-MCNC: 2.5 MG/DL — SIGNIFICANT CHANGE UP (ref 1.6–2.6)
MCHC RBC-ENTMCNC: 29.1 PG — SIGNIFICANT CHANGE UP (ref 27–34)
MCHC RBC-ENTMCNC: 31.8 GM/DL — LOW (ref 32–36)
MCV RBC AUTO: 91.6 FL — SIGNIFICANT CHANGE UP (ref 80–100)
NRBC # BLD: 0 /100 WBCS — SIGNIFICANT CHANGE UP (ref 0–0)
O2 CT VFR BLD CALC: 29 MMHG — LOW (ref 30–65)
O2 CT VFR BLD CALC: 34 MMHG — SIGNIFICANT CHANGE UP (ref 30–65)
PCO2 BLDMV: 44 MMHG — SIGNIFICANT CHANGE UP (ref 30–65)
PCO2 BLDMV: 46 MMHG — SIGNIFICANT CHANGE UP (ref 30–65)
PCO2 BLDV: 44 MMHG — SIGNIFICANT CHANGE UP (ref 35–50)
PH BLDMV: 7.42 — SIGNIFICANT CHANGE UP (ref 7.32–7.45)
PH BLDMV: 7.43 — SIGNIFICANT CHANGE UP (ref 7.32–7.45)
PH BLDV: 7.45 — SIGNIFICANT CHANGE UP (ref 7.35–7.45)
PHOSPHATE SERPL-MCNC: 2.3 MG/DL — LOW (ref 2.5–4.5)
PLATELET # BLD AUTO: 74 K/UL — LOW (ref 150–400)
PO2 BLDV: 26 MMHG — SIGNIFICANT CHANGE UP (ref 25–45)
POTASSIUM SERPL-MCNC: 4.5 MMOL/L — SIGNIFICANT CHANGE UP (ref 3.5–5.3)
POTASSIUM SERPL-SCNC: 4.5 MMOL/L — SIGNIFICANT CHANGE UP (ref 3.5–5.3)
PROT SERPL-MCNC: 5.2 G/DL — LOW (ref 6–8.3)
RBC # BLD: 2.99 M/UL — LOW (ref 4.2–5.8)
RBC # FLD: 14.2 % — SIGNIFICANT CHANGE UP (ref 10.3–14.5)
RH IG SCN BLD-IMP: POSITIVE — SIGNIFICANT CHANGE UP
SAO2 % BLDMV: 53 % — LOW (ref 60–90)
SAO2 % BLDMV: 62 % — SIGNIFICANT CHANGE UP (ref 60–90)
SAO2 % BLDV: 43 % — LOW (ref 67–88)
SODIUM SERPL-SCNC: 138 MMOL/L — SIGNIFICANT CHANGE UP (ref 135–145)
WBC # BLD: 13.13 K/UL — HIGH (ref 3.8–10.5)
WBC # FLD AUTO: 13.13 K/UL — HIGH (ref 3.8–10.5)

## 2020-12-31 PROCEDURE — 93010 ELECTROCARDIOGRAM REPORT: CPT | Mod: 77

## 2020-12-31 PROCEDURE — 93010 ELECTROCARDIOGRAM REPORT: CPT

## 2020-12-31 PROCEDURE — 99292 CRITICAL CARE ADDL 30 MIN: CPT

## 2020-12-31 PROCEDURE — 99291 CRITICAL CARE FIRST HOUR: CPT

## 2020-12-31 PROCEDURE — 71045 X-RAY EXAM CHEST 1 VIEW: CPT | Mod: 26

## 2020-12-31 RX ORDER — ASPIRIN/CALCIUM CARB/MAGNESIUM 324 MG
81 TABLET ORAL DAILY
Refills: 0 | Status: DISCONTINUED | OUTPATIENT
Start: 2020-12-31 | End: 2021-01-05

## 2020-12-31 RX ORDER — GLUCAGON INJECTION, SOLUTION 0.5 MG/.1ML
1 INJECTION, SOLUTION SUBCUTANEOUS ONCE
Refills: 0 | Status: DISCONTINUED | OUTPATIENT
Start: 2020-12-31 | End: 2021-01-01

## 2020-12-31 RX ORDER — DEXTROSE 50 % IN WATER 50 %
25 SYRINGE (ML) INTRAVENOUS ONCE
Refills: 0 | Status: DISCONTINUED | OUTPATIENT
Start: 2020-12-31 | End: 2021-01-01

## 2020-12-31 RX ORDER — DEXTROSE 50 % IN WATER 50 %
12.5 SYRINGE (ML) INTRAVENOUS ONCE
Refills: 0 | Status: DISCONTINUED | OUTPATIENT
Start: 2020-12-31 | End: 2021-01-01

## 2020-12-31 RX ORDER — FUROSEMIDE 40 MG
20 TABLET ORAL ONCE
Refills: 0 | Status: COMPLETED | OUTPATIENT
Start: 2020-12-31 | End: 2020-12-31

## 2020-12-31 RX ORDER — INSULIN LISPRO 100/ML
VIAL (ML) SUBCUTANEOUS
Refills: 0 | Status: DISCONTINUED | OUTPATIENT
Start: 2020-12-31 | End: 2021-01-01

## 2020-12-31 RX ORDER — FUROSEMIDE 40 MG
20 TABLET ORAL EVERY 12 HOURS
Refills: 0 | Status: DISCONTINUED | OUTPATIENT
Start: 2020-12-31 | End: 2020-12-31

## 2020-12-31 RX ORDER — SODIUM CHLORIDE 9 MG/ML
250 INJECTION, SOLUTION INTRAVENOUS ONCE
Refills: 0 | Status: COMPLETED | OUTPATIENT
Start: 2020-12-31 | End: 2020-12-31

## 2020-12-31 RX ORDER — INSULIN LISPRO 100/ML
VIAL (ML) SUBCUTANEOUS AT BEDTIME
Refills: 0 | Status: DISCONTINUED | OUTPATIENT
Start: 2020-12-31 | End: 2021-01-01

## 2020-12-31 RX ORDER — ATORVASTATIN CALCIUM 80 MG/1
40 TABLET, FILM COATED ORAL AT BEDTIME
Refills: 0 | Status: DISCONTINUED | OUTPATIENT
Start: 2020-12-31 | End: 2021-01-05

## 2020-12-31 RX ORDER — DEXTROSE 50 % IN WATER 50 %
15 SYRINGE (ML) INTRAVENOUS ONCE
Refills: 0 | Status: DISCONTINUED | OUTPATIENT
Start: 2020-12-31 | End: 2021-01-01

## 2020-12-31 RX ORDER — SODIUM CHLORIDE 9 MG/ML
1000 INJECTION, SOLUTION INTRAVENOUS
Refills: 0 | Status: DISCONTINUED | OUTPATIENT
Start: 2020-12-31 | End: 2021-01-01

## 2020-12-31 RX ORDER — PANTOPRAZOLE SODIUM 20 MG/1
40 TABLET, DELAYED RELEASE ORAL
Refills: 0 | Status: DISCONTINUED | OUTPATIENT
Start: 2020-12-31 | End: 2021-01-05

## 2020-12-31 RX ORDER — FUROSEMIDE 40 MG
20 TABLET ORAL EVERY 12 HOURS
Refills: 0 | Status: DISCONTINUED | OUTPATIENT
Start: 2020-12-31 | End: 2021-01-03

## 2020-12-31 RX ADMIN — Medication 20 MILLIGRAM(S): at 15:04

## 2020-12-31 RX ADMIN — Medication 81 MILLIGRAM(S): at 09:08

## 2020-12-31 RX ADMIN — Medication 20 MILLIGRAM(S): at 00:15

## 2020-12-31 RX ADMIN — Medication 20 MILLIGRAM(S): at 05:22

## 2020-12-31 RX ADMIN — ATORVASTATIN CALCIUM 40 MILLIGRAM(S): 80 TABLET, FILM COATED ORAL at 21:47

## 2020-12-31 RX ADMIN — SPIRONOLACTONE 25 MILLIGRAM(S): 25 TABLET, FILM COATED ORAL at 05:22

## 2020-12-31 RX ADMIN — Medication 62.5 MILLIMOLE(S): at 02:30

## 2020-12-31 RX ADMIN — CHLORHEXIDINE GLUCONATE 1 APPLICATION(S): 213 SOLUTION TOPICAL at 05:22

## 2020-12-31 NOTE — PROGRESS NOTE ADULT - SUBJECTIVE AND OBJECTIVE BOX
24H hour events: Patient c/o pulsating sensation in left chest area over night, resolved after ICD Representative interrogated ICD.     MEDICATIONS:  aspirin enteric coated 81 milliGRAM(s) Oral daily  milrinone Infusion 0.125 MICROgram(s)/kG/Min IV Continuous <Continuous>  spironolactone 25 milliGRAM(s) Oral daily    polyethylene glycol 3350 17 Gram(s) Oral daily    atorvastatin 40 milliGRAM(s) Oral at bedtime  dextrose 40% Gel 15 Gram(s) Oral once  dextrose 50% Injectable 25 Gram(s) IV Push once  dextrose 50% Injectable 12.5 Gram(s) IV Push once  dextrose 50% Injectable 25 Gram(s) IV Push once  dextrose 50% Injectable 50 milliLiter(s) IV Push every 15 minutes  dextrose 50% Injectable 25 milliLiter(s) IV Push every 15 minutes  glucagon  Injectable 1 milliGRAM(s) IntraMuscular once  insulin lispro (ADMELOG) corrective regimen sliding scale   SubCutaneous three times a day before meals  insulin lispro (ADMELOG) corrective regimen sliding scale   SubCutaneous at bedtime    chlorhexidine 2% Cloths 1 Application(s) Topical <User Schedule>  dextrose 5%. 1000 milliLiter(s) IV Continuous <Continuous>  dextrose 5%. 1000 milliLiter(s) IV Continuous <Continuous>  potassium chloride  10 mEq/50 mL IVPB 10 milliEquivalent(s) IV Intermittent every 1 hour  potassium chloride  10 mEq/50 mL IVPB 10 milliEquivalent(s) IV Intermittent every 1 hour  potassium chloride  10 mEq/50 mL IVPB 10 milliEquivalent(s) IV Intermittent every 1 hour  sodium chloride 0.9%. 1000 milliLiter(s) IV Continuous <Continuous>      REVIEW OF SYSTEMS:  Complete 10point ROS negative.    PHYSICAL EXAM:  T(C): 36.4 (12-31-20 @ 08:00), Max: 37.7 (12-30-20 @ 16:00)  HR: 85 (12-31-20 @ 09:00) (72 - 86)  RR: 22 (12-31-20 @ 09:00) (11 - 27)  SpO2: 99% (12-31-20 @ 09:00) (91% - 100%)      30 Dec 2020 07:01  -  31 Dec 2020 07:00  --------------------------------------------------------  IN: 1324 mL / OUT: 4500 mL / NET: -3176 mL    31 Dec 2020 07:01  -  31 Dec 2020 11:30  --------------------------------------------------------  IN: 10.8 mL / OUT: 145 mL / NET: -134.2 mL      Appearance: Normal	  Cardiovascular: Normal S1 S2, regular.   Respiratory: Lungs clear to auscultation, chest tube x 1 	  Psychiatry: A & O x 3, Mood & affect appropriate  Gastrointestinal:  Soft, Non-tender, + BS	  Skin: Midsternal incision with dry, gauze dressing.  Left infraclavicular chest wall site with dry, gauze dressing, clean and intact.   Extremities: Normal range of motion, No clubbing, cyanosis   Vascular: Peripheral pulses palpable 2+ bilaterally        LABS:	 	    CBC Full  -  ( 31 Dec 2020 01:02 )  WBC Count : 13.13 K/uL  Hemoglobin : 8.7 g/dL  Hematocrit : 27.4 %  Platelet Count - Automated : 74 K/uL  Mean Cell Volume : 91.6 fl  Mean Cell Hemoglobin : 29.1 pg  Mean Cell Hemoglobin Concentration : 31.8 gm/dL  Auto Neutrophil # : x  Auto Lymphocyte # : x  Auto Monocyte # : x  Auto Eosinophil # : x  Auto Basophil # : x  Auto Neutrophil % : x  Auto Lymphocyte % : x  Auto Monocyte % : x  Auto Eosinophil % : x  Auto Basophil % : x    12-31    138  |  104  |  18  ----------------------------<  124<H>  4.5   |  25  |  0.84  12-30    136  |  105  |  20  ----------------------------<  139<H>  4.5   |  23  |  0.92    Ca    8.4      31 Dec 2020 01:02  Ca    8.5      30 Dec 2020 00:30  Phos  2.3     12-31  Phos  3.0     12-30  Mg     2.5     12-31  Mg     2.9     12-30    TPro  5.2<L>  /  Alb  3.4  /  TBili  0.6  /  DBili  x   /  AST  31  /  ALT  23  /  AlkPhos  39<L>  12-31  TPro  5.1<L>  /  Alb  3.7  /  TBili  0.5  /  DBili  x   /  AST  56<H>  /  ALT  29  /  AlkPhos  32<L>  12-30        TELEMETRY: 	    ECG:  	   	       24H hour events: Patient c/o pulsating sensation in left chest area over night, resolved after ICD Representative interrogated ICD.     MEDICATIONS:  aspirin enteric coated 81 milliGRAM(s) Oral daily  milrinone Infusion 0.125 MICROgram(s)/kG/Min IV Continuous <Continuous>  spironolactone 25 milliGRAM(s) Oral daily    polyethylene glycol 3350 17 Gram(s) Oral daily    atorvastatin 40 milliGRAM(s) Oral at bedtime  dextrose 40% Gel 15 Gram(s) Oral once  dextrose 50% Injectable 25 Gram(s) IV Push once  dextrose 50% Injectable 12.5 Gram(s) IV Push once  dextrose 50% Injectable 25 Gram(s) IV Push once  dextrose 50% Injectable 50 milliLiter(s) IV Push every 15 minutes  dextrose 50% Injectable 25 milliLiter(s) IV Push every 15 minutes  glucagon  Injectable 1 milliGRAM(s) IntraMuscular once  insulin lispro (ADMELOG) corrective regimen sliding scale   SubCutaneous three times a day before meals  insulin lispro (ADMELOG) corrective regimen sliding scale   SubCutaneous at bedtime    chlorhexidine 2% Cloths 1 Application(s) Topical <User Schedule>  dextrose 5%. 1000 milliLiter(s) IV Continuous <Continuous>  dextrose 5%. 1000 milliLiter(s) IV Continuous <Continuous>  potassium chloride  10 mEq/50 mL IVPB 10 milliEquivalent(s) IV Intermittent every 1 hour  potassium chloride  10 mEq/50 mL IVPB 10 milliEquivalent(s) IV Intermittent every 1 hour  potassium chloride  10 mEq/50 mL IVPB 10 milliEquivalent(s) IV Intermittent every 1 hour  sodium chloride 0.9%. 1000 milliLiter(s) IV Continuous <Continuous>      REVIEW OF SYSTEMS:  Complete 10point ROS negative.    PHYSICAL EXAM:  T(C): 36.4 (12-31-20 @ 08:00), Max: 37.7 (12-30-20 @ 16:00)  HR: 85 (12-31-20 @ 09:00) (72 - 86)  RR: 22 (12-31-20 @ 09:00) (11 - 27)  SpO2: 99% (12-31-20 @ 09:00) (91% - 100%)      30 Dec 2020 07:01  -  31 Dec 2020 07:00  --------------------------------------------------------  IN: 1324 mL / OUT: 4500 mL / NET: -3176 mL    31 Dec 2020 07:01  -  31 Dec 2020 11:30  --------------------------------------------------------  IN: 10.8 mL / OUT: 145 mL / NET: -134.2 mL      Appearance: Normal	  Cardiovascular: Normal S1 S2, regular.   Respiratory: Lungs clear to auscultation, chest tube x 1 	  Psychiatry: A & O x 3, Mood & affect appropriate  Gastrointestinal:  Soft, Non-tender, + BS	  Skin: Midsternal incision with dry, gauze dressing.  Left infraclavicular chest wall site with dry, gauze dressing, clean and intact.   Extremities: Normal range of motion, No clubbing, cyanosis   Vascular: Peripheral pulses palpable 2+ bilaterally      LABS:	 	    CBC Full  -  ( 31 Dec 2020 01:02 )  WBC Count : 13.13 K/uL  Hemoglobin : 8.7 g/dL  Hematocrit : 27.4 %  Platelet Count - Automated : 74 K/uL  Mean Cell Volume : 91.6 fl  Mean Cell Hemoglobin : 29.1 pg  Mean Cell Hemoglobin Concentration : 31.8 gm/dL  Auto Neutrophil # : x  Auto Lymphocyte # : x  Auto Monocyte # : x  Auto Eosinophil # : x  Auto Basophil # : x  Auto Neutrophil % : x  Auto Lymphocyte % : x  Auto Monocyte % : x  Auto Eosinophil % : x  Auto Basophil % : x    12-31    138  |  104  |  18  ----------------------------<  124<H>  4.5   |  25  |  0.84  12-30    136  |  105  |  20  ----------------------------<  139<H>  4.5   |  23  |  0.92    Ca    8.4      31 Dec 2020 01:02  Ca    8.5      30 Dec 2020 00:30  Phos  2.3     12-31  Phos  3.0     12-30  Mg     2.5     12-31  Mg     2.9     12-30    TPro  5.2<L>  /  Alb  3.4  /  TBili  0.6  /  DBili  x   /  AST  31  /  ALT  23  /  AlkPhos  39<L>  12-31  TPro  5.1<L>  /  Alb  3.7  /  TBili  0.5  /  DBili  x   /  AST  56<H>  /  ALT  29  /  AlkPhos  32<L>  12-30        TELEMETRY: 	  Atrial sensed, Ventricular paced 90's with PVC       	       24H hour events: Patient c/o pulsating sensation in left chest area over night, resolved after ICD Representative interrogated ICD.     MEDICATIONS:  aspirin enteric coated 81 milliGRAM(s) Oral daily  milrinone Infusion 0.125 MICROgram(s)/kG/Min IV Continuous <Continuous>  spironolactone 25 milliGRAM(s) Oral daily    polyethylene glycol 3350 17 Gram(s) Oral daily    atorvastatin 40 milliGRAM(s) Oral at bedtime  dextrose 40% Gel 15 Gram(s) Oral once  dextrose 50% Injectable 25 Gram(s) IV Push once  dextrose 50% Injectable 12.5 Gram(s) IV Push once  dextrose 50% Injectable 25 Gram(s) IV Push once  dextrose 50% Injectable 50 milliLiter(s) IV Push every 15 minutes  dextrose 50% Injectable 25 milliLiter(s) IV Push every 15 minutes  glucagon  Injectable 1 milliGRAM(s) IntraMuscular once  insulin lispro (ADMELOG) corrective regimen sliding scale   SubCutaneous three times a day before meals  insulin lispro (ADMELOG) corrective regimen sliding scale   SubCutaneous at bedtime    chlorhexidine 2% Cloths 1 Application(s) Topical <User Schedule>  dextrose 5%. 1000 milliLiter(s) IV Continuous <Continuous>  dextrose 5%. 1000 milliLiter(s) IV Continuous <Continuous>  potassium chloride  10 mEq/50 mL IVPB 10 milliEquivalent(s) IV Intermittent every 1 hour  potassium chloride  10 mEq/50 mL IVPB 10 milliEquivalent(s) IV Intermittent every 1 hour  potassium chloride  10 mEq/50 mL IVPB 10 milliEquivalent(s) IV Intermittent every 1 hour  sodium chloride 0.9%. 1000 milliLiter(s) IV Continuous <Continuous>      REVIEW OF SYSTEMS:  Complete 10point ROS negative.    PHYSICAL EXAM:  T(C): 36.4 (12-31-20 @ 08:00), Max: 37.7 (12-30-20 @ 16:00)  HR: 85 (12-31-20 @ 09:00) (72 - 86)  RR: 22 (12-31-20 @ 09:00) (11 - 27)  SpO2: 99% (12-31-20 @ 09:00) (91% - 100%)      30 Dec 2020 07:01  -  31 Dec 2020 07:00  --------------------------------------------------------  IN: 1324 mL / OUT: 4500 mL / NET: -3176 mL    31 Dec 2020 07:01  -  31 Dec 2020 11:30  --------------------------------------------------------  IN: 10.8 mL / OUT: 145 mL / NET: -134.2 mL      Appearance: Normal	  Cardiovascular: Normal S1 S2, regular.   Respiratory: Lungs clear to auscultation, chest tube x 1 	  Psychiatry: A & O x 3, Mood & affect appropriate  Gastrointestinal:  Soft, Non-tender, + BS	  Skin: Midsternal incision with dry, gauze dressing.  Left infraclavicular chest wall site with dry, gauze dressing, clean and intact.   Extremities: Normal range of motion, No clubbing, cyanosis   Vascular: Peripheral pulses palpable 2+ bilaterally      LABS:	 	    CBC Full  -  ( 31 Dec 2020 01:02 )  WBC Count : 13.13 K/uL  Hemoglobin : 8.7 g/dL  Hematocrit : 27.4 %  Platelet Count - Automated : 74 K/uL  Mean Cell Volume : 91.6 fl  Mean Cell Hemoglobin : 29.1 pg  Mean Cell Hemoglobin Concentration : 31.8 gm/dL  Auto Neutrophil # : x  Auto Lymphocyte # : x  Auto Monocyte # : x  Auto Eosinophil # : x  Auto Basophil # : x  Auto Neutrophil % : x  Auto Lymphocyte % : x  Auto Monocyte % : x  Auto Eosinophil % : x  Auto Basophil % : x    12-31    138  |  104  |  18  ----------------------------<  124<H>  4.5   |  25  |  0.84  12-30    136  |  105  |  20  ----------------------------<  139<H>  4.5   |  23  |  0.92    Ca    8.4      31 Dec 2020 01:02  Ca    8.5      30 Dec 2020 00:30  Phos  2.3     12-31  Phos  3.0     12-30  Mg     2.5     12-31  Mg     2.9     12-30    TPro  5.2<L>  /  Alb  3.4  /  TBili  0.6  /  DBili  x   /  AST  31  /  ALT  23  /  AlkPhos  39<L>  12-31  TPro  5.1<L>  /  Alb  3.7  /  TBili  0.5  /  DBili  x   /  AST  56<H>  /  ALT  29  /  AlkPhos  32<L>  12-30        TELEMETRY: 	  Atrial sensed, Ventricular paced 90's with PVC      CXRAY:   EXAM:  XR CHEST PORTABLE ROUTINE 1V                        PROCEDURE DATE:  12/31/2020    INTERPRETATION:  A single chest x-ray was obtained on December 31, 2020.  Indication: Status post cardiac surgery.  Impression:    The heart is enlarged. Left pleural effusion. Left lower lobe pneumonia and/or atelectasis cannot be ruled out. The right lung is clear. All life supporting devices are in good position and unchanged when compared to previous study done December 30, 2020 at 120.       24H hour events: Patient c/o pulsating sensation in left chest area over night, resolved after ICD Representative interrogated ICD.     MEDICATIONS:  aspirin enteric coated 81 milliGRAM(s) Oral daily  milrinone Infusion 0.125 MICROgram(s)/kG/Min IV Continuous <Continuous>  spironolactone 25 milliGRAM(s) Oral daily    polyethylene glycol 3350 17 Gram(s) Oral daily    atorvastatin 40 milliGRAM(s) Oral at bedtime  dextrose 40% Gel 15 Gram(s) Oral once  dextrose 50% Injectable 25 Gram(s) IV Push once  dextrose 50% Injectable 12.5 Gram(s) IV Push once  dextrose 50% Injectable 25 Gram(s) IV Push once  dextrose 50% Injectable 50 milliLiter(s) IV Push every 15 minutes  dextrose 50% Injectable 25 milliLiter(s) IV Push every 15 minutes  glucagon  Injectable 1 milliGRAM(s) IntraMuscular once  insulin lispro (ADMELOG) corrective regimen sliding scale   SubCutaneous three times a day before meals  insulin lispro (ADMELOG) corrective regimen sliding scale   SubCutaneous at bedtime    chlorhexidine 2% Cloths 1 Application(s) Topical <User Schedule>  dextrose 5%. 1000 milliLiter(s) IV Continuous <Continuous>  dextrose 5%. 1000 milliLiter(s) IV Continuous <Continuous>  potassium chloride  10 mEq/50 mL IVPB 10 milliEquivalent(s) IV Intermittent every 1 hour  potassium chloride  10 mEq/50 mL IVPB 10 milliEquivalent(s) IV Intermittent every 1 hour  potassium chloride  10 mEq/50 mL IVPB 10 milliEquivalent(s) IV Intermittent every 1 hour  sodium chloride 0.9%. 1000 milliLiter(s) IV Continuous <Continuous>      REVIEW OF SYSTEMS:  Complete 10point ROS negative.    PHYSICAL EXAM:  T(C): 36.4 (12-31-20 @ 08:00), Max: 37.7 (12-30-20 @ 16:00)  HR: 85 (12-31-20 @ 09:00) (72 - 86)  RR: 22 (12-31-20 @ 09:00) (11 - 27)  SpO2: 99% (12-31-20 @ 09:00) (91% - 100%)      30 Dec 2020 07:01  -  31 Dec 2020 07:00  --------------------------------------------------------  IN: 1324 mL / OUT: 4500 mL / NET: -3176 mL    31 Dec 2020 07:01  -  31 Dec 2020 11:30  --------------------------------------------------------  IN: 10.8 mL / OUT: 145 mL / NET: -134.2 mL      Appearance: Normal	  Cardiovascular: Normal S1 S2, regular.   Respiratory: Lungs clear to auscultation, chest tube x 1 	  Psychiatry: A & O x 3, Mood & affect appropriate  Gastrointestinal:  Soft, Non-tender, + BS	  Skin: Midsternal incision with dry, gauze dressing.  Left infraclavicular chest wall site with dry, gauze dressing, clean and intact.   Extremities: Normal range of motion, No clubbing, cyanosis   Vascular: Peripheral pulses palpable 2+ bilaterally      LABS:	 	    CBC Full  -  ( 31 Dec 2020 01:02 )  WBC Count : 13.13 K/uL  Hemoglobin : 8.7 g/dL  Hematocrit : 27.4 %  Platelet Count - Automated : 74 K/uL  Mean Cell Volume : 91.6 fl  Mean Cell Hemoglobin : 29.1 pg  Mean Cell Hemoglobin Concentration : 31.8 gm/dL  Auto Neutrophil # : x  Auto Lymphocyte # : x  Auto Monocyte # : x  Auto Eosinophil # : x  Auto Basophil # : x  Auto Neutrophil % : x  Auto Lymphocyte % : x  Auto Monocyte % : x  Auto Eosinophil % : x  Auto Basophil % : x    12-31    138  |  104  |  18  ----------------------------<  124<H>  4.5   |  25  |  0.84  12-30    136  |  105  |  20  ----------------------------<  139<H>  4.5   |  23  |  0.92    Ca    8.4      31 Dec 2020 01:02  Ca    8.5      30 Dec 2020 00:30  Phos  2.3     12-31  Phos  3.0     12-30  Mg     2.5     12-31  Mg     2.9     12-30    TPro  5.2<L>  /  Alb  3.4  /  TBili  0.6  /  DBili  x   /  AST  31  /  ALT  23  /  AlkPhos  39<L>  12-31  TPro  5.1<L>  /  Alb  3.7  /  TBili  0.5  /  DBili  x   /  AST  56<H>  /  ALT  29  /  AlkPhos  32<L>  12-30        TELEMETRY: 	  Atrial sensed, Ventricular paced 90's with PVC      CXRAY:   EXAM:  XR CHEST PORTABLE ROUTINE 1V                        PROCEDURE DATE:  12/31/2020    INTERPRETATION:  A single chest x-ray was obtained on December 31, 2020.  Indication: Status post cardiac surgery.  Impression:    The heart is enlarged. Left pleural effusion. Left lower lobe pneumonia and/or atelectasis cannot be ruled out. The right lung is clear. All life supporting devices are in good position and unchanged when compared to previous study done December 30, 2020 at 120.    -No Pneumothorax per preliminary by Radiology.

## 2020-12-31 NOTE — PHYSICAL THERAPY INITIAL EVALUATION ADULT - PERTINENT HX OF CURRENT PROBLEM, REHAB EVAL
74 y/o M w/ PMH of HTN, HLD, CAD s/p stent in 2010, Colon CA s/p resection + radiation + ileostomy placement and reversal, and an Aortic Ascending aneurysm

## 2020-12-31 NOTE — PROGRESS NOTE ADULT - ASSESSMENT
76 yo M POD 2 s/p CABG and ascending aortic aneurysm repair. EP called for epicardial leads not pacing. TVP now placed with threshold at 2.5 and capturing. Runs of VT overnight on lido.  -TTE reviewed with severe global LV dysfunction with poorly visualized endocardium. Although patient is POD 2 post CABG, given that the patient had CHB prior to the procedure and is now having several intermittent runs of prolonged ventricular tachycardia, an BiV ICD is indicated.  S/P Bi-V ICD on 12/30.     75 y.o. Male with s/p CABG x 3 and ascending aortic aneurysm repair.  Post op epicardial leads not pacing with high thresholds with Complete Heart Block prior to procedure and runs of VT on telemetry  in setting of severe global LV dysfunction on echocardiogram.  Patient is now s/p CI BiV ICD on 12/30.    1. Complete Heart Block with severe LV dysfunction and episodes of VT on telemetry post op  -s/p [employer] BiV ICD on 12/30  -Post procedure activity and restrictions reviewed with patient  -s/p ICD interrogation and modification by [facility] representative   -Follow up chest Xray results      Becky [facility] [id number]              75 y.o. Male with s/p CABG x 3 and ascending aortic aneurysm repair.  Post op epicardial leads not pacing with high thresholds with Complete Heart Block prior to procedure and runs of VT on telemetry  in setting of severe global LV dysfunction on echocardiogram.  Patient is now s/p [employer] BiV ICD on 12/30.    1. Complete Heart Block with severe LV dysfunction and episodes of VT on telemetry post op  -s/p [employer] BiV ICD on 12/30.    -Post procedure activity and restrictions reviewed with patient  ICD booklet and ID Card provided.   -s/p ICD interrogation and modification by [facility] representative       [facility] [id number]       EP will sign off, reconsult as needed.            75 y.o. Male with s/p CABG x 3 and ascending aortic aneurysm repair.  Post op epicardial leads not pacing with high thresholds with Complete Heart Block prior to procedure and runs of VT on telemetry  in setting of severe global LV dysfunction on echocardiogram.  Patient is now s/p [employer] BiV ICD on 12/30.    1. Complete Heart Block with severe LV dysfunction and episodes of VT on telemetry post op  -s/p [employer] BiV ICD on 12/30.    -Post procedure activity and restrictions reviewed with patient  ICD booklet and ID Card provided.   -s/p ICD interrogation and modification by [employer] representative   -Maintain chest wall/ ICD site dressing for 48 hours, may remove after 48 hours.       Becky [facility] [id number]       EP will sign off, reconsult as needed.

## 2020-12-31 NOTE — PHYSICAL THERAPY INITIAL EVALUATION ADULT - PLANNED THERAPY INTERVENTIONS, PT EVAL
1. LTG Pt will be indep to neg 4 steps w/ HR w/i 2 weeks/bed mobility training/gait training/transfer training

## 2020-12-31 NOTE — PROGRESS NOTE ADULT - SUBJECTIVE AND OBJECTIVE BOX
CHARLINE LAFLEUR  MRN-[id number]  Patient is a 75y old  Male who presents with a chief complaint of CAD (31 Dec 2020 07:36)    HPI:  74 y/o M w/ PMH of HTN, HLD, CAD s/p stent in 2010, Colon CA s/p resection + radiation + ileostomy placement and reversal, and an Aortic Ascending aneurysm 4.7cm presents to PST for a CABG x3 and possible Ascending Aortic Aneurysm Repair on 12/28/20. Echocardiogram showed a new decline in LVEF to 29% from 38%. Denies N/V/D, fevers, chills, cough, palpitations, chest pain, syncope, orthopnea, nocturnal paroxysmal dyspnea, edema, cyanosis, heart murmurs, varicosities, phlebitis, and claudication. COVID testing scheduled at [facility] on 12/26/2020.  (21 Dec 2020 10:15)      Surgery/Hospital course:  12/28 S/P CABG x3. Ascending aorta replacement for ascending aorta aneurysm.   12/29 Extubated  12/30 PPM placed    Today/Overnight:    Vital Signs Last 24 Hrs  T(C): 37.8 (31 Dec 2020 20:00), Max: 37.8 (31 Dec 2020 20:00)  T(F): 100 (31 Dec 2020 20:00), Max: 100 (31 Dec 2020 20:00)  HR: 91 (31 Dec 2020 22:00) (75 - 92)  BP: --  BP(mean): --  RR: 20 (31 Dec 2020 22:00) (12 - 29)  SpO2: 96% (31 Dec 2020 22:00) (94% - 100%)  ============================I/O===========================  I&O's Detail    30 Dec 2020 07:01  -  31 Dec 2020 07:00  --------------------------------------------------------  IN:    DOBUTamine: 44 mL    Insulin: 52.5 mL    IV PiggyBack: 100 mL    IV PiggyBack: 250 mL    Lidocaine: 45 mL    Milrinone: 61.9 mL    Milrinone: 88.5 mL    Milrinone: 7.1 mL    Oral Fluid: 400 mL    sodium chloride 0.9%: 275 mL  Total IN: 1324 mL    OUT:    Chest Tube (mL): 300 mL    Indwelling Catheter - Urethral (mL): 4200 mL    Norepinephrine: 0 mL    Vasopressin: 0 mL  Total OUT: 4500 mL    Total NET: -3176 mL      31 Dec 2020 07:01  -  31 Dec 2020 22:05  --------------------------------------------------------  IN:    Insulin: 2 mL    Milrinone: 4.4 mL    Milrinone: 61.6 mL    Oral Fluid: 640 mL    sodium chloride 0.9%: 75 mL  Total IN: 783 mL    OUT:    Chest Tube (mL): 110 mL    Indwelling Catheter - Urethral (mL): 1255 mL  Total OUT: 1365 mL    Total NET: -582 mL        ============================ LABS =========================                        8.7    13.13 )-----------( 75       ( 31 Dec 2020 01:02 )             27.4     12-31    138  |  104  |  18  ----------------------------<  124<H>  4.5   |  25  |  0.84    Ca    8.4      31 Dec 2020 01:02  Phos  2.3     12-31  Mg     2.5     12-31    TPro  5.2<L>  /  Alb  3.4  /  TBili  0.6  /  DBili  x   /  AST  31  /  ALT  23  /  AlkPhos  39<L>  12-31    LIVER FUNCTIONS - ( 31 Dec 2020 01:02 )  Alb: 3.4 g/dL / Pro: 5.2 g/dL / ALK PHOS: 39 U/L / ALT: 23 U/L / AST: 31 U/L / GGT: x             ABG - ( 31 Dec 2020 11:42 )  pH, Arterial: 7.50  pH, Blood: x     /  pCO2: 36    /  pO2: 96    / HCO3: 28    / Base Excess: 4.6   /  SaO2: 98                  ======================Micro/Rad/Cardio=================  CXR: Reviewed  Echo: Reviewed  ======================================================  PAST MEDICAL & SURGICAL HISTORY:  Ascending aortic aneurysm  4.7cm    ASHD (arteriosclerotic heart disease)    HFrEF (heart failure with reduced ejection fraction)    Gout    HLD (hyperlipidemia)    Colon carcinoma  Sp Ileostomy and reverasl with chemo/rt (2012)    CAD (coronary artery disease)  SP ptca w stent 5/2010    HTN (Hypertension)    Incisional hernia  2013    History of reversal of ileostomy  2012    S/P coronary artery stent placement  2010 w/ ileostomy    S/P colon resection      ========================ASSESSMENT ================  CAD S/P CABG x3 on 12/28  Ascending aorta aneurysm S/P Ascending aorta replacement on 12/28  Stress Hyperglycemia  Leukocytosis  Obesity OHS / SUSAN   S/p PPM placement 12/30    Plan:  ====================== NEUROLOGY=====================  Nonfocal, continue to monitor neuro status as per ICU protocol    ==================== RESPIRATORY======================  Hypoxia, on supplemental oxygen via 4 L nasal cannula with SpO2 96  %. s/p extubation on 12/29  Encourage incentive spirometry, continue pulse ox monitoring, follow ABGs    ====================CARDIOVASCULAR==================  CAD s/p CABG x3  Ascending aorta aneurysm s/p ascending aorta replacement   S/p PPM placement with lead interrogation this morning  Invasive hemodynamic monitoring and telemetry monitoring  Inotropic Support with IV Milrinone gtt  ASA and Lipitor for graft patency    milrinone Infusion 0.125 MICROgram(s)/kG/Min (4.41 mL/Hr) IV Continuous <Continuous>  aspirin enteric coated 81 milliGRAM(s) Oral daily  atorvastatin 40 milliGRAM(s) Oral at bedtime    ===================HEMATOLOGIC/ONC ===================  Anemia. Thrombocytopenia. Monitor H&H, PLTs     ===================== RENAL =========================  fluid overload, Continue diuresis with IV Lasix, PO Spironolactone for net negative fluid balance  Continue to monitor I/Os, BUN/Creatinine, and urine output.     spironolactone 25 milliGRAM(s) Oral daily  furosemide   Injectable 20 milliGRAM(s) IV Push every 12 hours  ==================== GASTROINTESTINAL===================  Regular diet  Bowel regimen with Miralax.     dextrose 5%. 1000 milliLiter(s) (50 mL/Hr) IV Continuous <Continuous>  dextrose 5%. 1000 milliLiter(s) (100 mL/Hr) IV Continuous <Continuous>  GI prophylaxis, pantoprazole    Tablet 40 milliGRAM(s) Oral before breakfast  polyethylene glycol 3350 17 Gram(s) Oral daily  potassium chloride  10 mEq/50 mL IVPB 10 milliEquivalent(s) IV Intermittent every 1 hour  potassium chloride  10 mEq/50 mL IVPB 10 milliEquivalent(s) IV Intermittent every 1 hour  potassium chloride  10 mEq/50 mL IVPB 10 milliEquivalent(s) IV Intermittent every 1 hour  sodium chloride 0.9%. 1000 milliLiter(s) (10 mL/Hr) IV Continuous <Continuous>    =======================    ENDOCRINE  =====================  Stress hyperglycemia, requiring SQ Lispro sliding scale premeals, qhs, glucagon prn.     dextrose 40% Gel 15 Gram(s) Oral once  dextrose 50% Injectable 25 Gram(s) IV Push once  dextrose 50% Injectable 12.5 Gram(s) IV Push once  dextrose 50% Injectable 25 Gram(s) IV Push once  dextrose 50% Injectable 50 milliLiter(s) IV Push every 15 minutes  dextrose 50% Injectable 25 milliLiter(s) IV Push every 15 minutes  glucagon  Injectable 1 milliGRAM(s) IntraMuscular once  insulin lispro (ADMELOG) corrective regimen sliding scale   SubCutaneous three times a day before meals  insulin lispro (ADMELOG) corrective regimen sliding scale   SubCutaneous at bedtime    ========================INFECTIOUS DISEASE================  Temp 100F. WBC elevated at 13.13.   Monitor fever curve and trend CBC    Patient requires continuous monitoring with bedside rhythm monitoring, pulse oximetry monitoring, and continuous central venous and arterial pressure monitoring; and intermittent blood gas analysis.  Care plan discussed with ICU care team.    Patient remained critical, at risk for life threatening decompensation.   I have spent 35 minutes providing acute care with multiple re-evaluations throughout the evening.     By signing my name below, I, Steph Cole, attest that this documentation has been prepared under the direction and in the presence of SUZIE Rubio   Electronically signed: Aly Conde, 12-31-20 @ 22:05    I, SUZIE Rubio , personally performed the services described in this documentation. All medical record entries made by the shaynaibdebbie were at my direction and in my presence. I have reviewed the chart and agree that the record reflects my personal performance and is accurate and complete  Electronically signed: SUZIE Rubio , 12-31-20 @ 22:05       CHARLINE LAFLEUR  MRN-[id number]  Patient is a 75y old  Male who presents with a chief complaint of CAD (31 Dec 2020 07:36)    HPI:  76 y/o M w/ PMH of HTN, HLD, CAD s/p stent in 2010, Colon CA s/p resection + radiation + ileostomy placement and reversal, and an Aortic Ascending aneurysm 4.7cm presents to PST for a CABG x3 and possible Ascending Aortic Aneurysm Repair on 12/28/20. Echocardiogram showed a new decline in LVEF to 29% from 38%. Denies N/V/D, fevers, chills, cough, palpitations, chest pain, syncope, orthopnea, nocturnal paroxysmal dyspnea, edema, cyanosis, heart murmurs, varicosities, phlebitis, and claudication. COVID testing scheduled at [facility] on 12/26/2020.  (21 Dec 2020 10:15)      Surgery/Hospital course:  12/28 S/P CABG x3. Ascending aorta replacement for ascending aorta aneurysm.   12/29 Extubated  12/30 PPM placed    Today/Overnight:  - V-paced at 80-90, BP stable  - [place] removed  - Remains on Primacor, venous gas low  - Diuresing with Lasix 20 IV BID, good UOP    Vital Signs Last 24 Hrs  T(C): 37.8 (31 Dec 2020 20:00), Max: 37.8 (31 Dec 2020 20:00)  T(F): 100 (31 Dec 2020 20:00), Max: 100 (31 Dec 2020 20:00)  HR: 91 (31 Dec 2020 22:00) (75 - 92)  BP: --  BP(mean): --  RR: 20 (31 Dec 2020 22:00) (12 - 29)  SpO2: 96% (31 Dec 2020 22:00) (94% - 100%)  ============================I/O===========================  I&O's Detail    30 Dec 2020 07:01  -  31 Dec 2020 07:00  --------------------------------------------------------  IN:    DOBUTamine: 44 mL    Insulin: 52.5 mL    IV PiggyBack: 100 mL    IV PiggyBack: 250 mL    Lidocaine: 45 mL    Milrinone: 61.9 mL    Milrinone: 88.5 mL    Milrinone: 7.1 mL    Oral Fluid: 400 mL    sodium chloride 0.9%: 275 mL  Total IN: 1324 mL    OUT:    Chest Tube (mL): 300 mL    Indwelling Catheter - Urethral (mL): 4200 mL    Norepinephrine: 0 mL    Vasopressin: 0 mL  Total OUT: 4500 mL    Total NET: -3176 mL      31 Dec 2020 07:01  -  31 Dec 2020 22:05  --------------------------------------------------------  IN:    Insulin: 2 mL    Milrinone: 4.4 mL    Milrinone: 61.6 mL    Oral Fluid: 640 mL    sodium chloride 0.9%: 75 mL  Total IN: 783 mL    OUT:    Chest Tube (mL): 110 mL    Indwelling Catheter - Urethral (mL): 1255 mL  Total OUT: 1365 mL    Total NET: -582 mL        ============================ LABS =========================                        8.7    13.13 )-----------( 74       ( 31 Dec 2020 01:02 )             27.4     12-31    138  |  104  |  18  ----------------------------<  124<H>  4.5   |  25  |  0.84    Ca    8.4      31 Dec 2020 01:02  Phos  2.3     12-31  Mg     2.5     12-31    TPro  5.2<L>  /  Alb  3.4  /  TBili  0.6  /  DBili  x   /  AST  31  /  ALT  23  /  AlkPhos  39<L>  12-31    LIVER FUNCTIONS - ( 31 Dec 2020 01:02 )  Alb: 3.4 g/dL / Pro: 5.2 g/dL / ALK PHOS: 39 U/L / ALT: 23 U/L / AST: 31 U/L / GGT: x             ABG - ( 31 Dec 2020 11:42 )  pH, Arterial: 7.50  pH, Blood: x     /  pCO2: 36    /  pO2: 96    / HCO3: 28    / Base Excess: 4.6   /  SaO2: 98                  ======================Micro/Rad/Cardio=================  CXR: Reviewed  Echo: Reviewed  ======================================================  PAST MEDICAL & SURGICAL HISTORY:  Ascending aortic aneurysm  4.7cm    ASHD (arteriosclerotic heart disease)    HFrEF (heart failure with reduced ejection fraction)    Gout    HLD (hyperlipidemia)    Colon carcinoma  Sp Ileostomy and reverasl with chemo/rt (2012)    CAD (coronary artery disease)  SP ptca w stent 5/2010    HTN (Hypertension)    Incisional hernia  2013    History of reversal of ileostomy  2012    S/P coronary artery stent placement  2010 w/ ileostomy    S/P colon resection      ========================ASSESSMENT ================  CAD S/P CABG x3 on 12/28  Ascending aorta aneurysm S/P Ascending aorta replacement on 12/28  Stress Hyperglycemia  Leukocytosis  Obesity OHS / SUSAN   S/p PPM placement 12/30    Plan:  ====================== NEUROLOGY=====================  Nonfocal, continue to monitor neuro status as per ICU protocol    ==================== RESPIRATORY======================  Hypoxia, on supplemental oxygen via 4 L nasal cannula with SpO2 96  %. s/p extubation on 12/29  Encourage incentive spirometry, continue pulse ox monitoring, follow ABGs    ====================CARDIOVASCULAR==================  CAD s/p CABG x3  Ascending aorta aneurysm s/p ascending aorta replacement   S/p PPM placement with lead interrogation this morning  Invasive hemodynamic monitoring and telemetry monitoring  Inotropic Support with IV Milrinone gtt, wean as tolerated  ASA and Lipitor for graft patency    milrinone Infusion 0.125 MICROgram(s)/kG/Min (4.41 mL/Hr) IV Continuous <Continuous>  aspirin enteric coated 81 milliGRAM(s) Oral daily  atorvastatin 40 milliGRAM(s) Oral at bedtime    ===================HEMATOLOGIC/ONC ===================  Anemia. Thrombocytopenia. Monitor H&H, PLTs     ===================== RENAL =========================  fluid overload, Continue diuresis with IV Lasix, PO Spironolactone for net negative fluid balance  Continue to monitor I/Os, BUN/Creatinine, and urine output.     spironolactone 25 milliGRAM(s) Oral daily  furosemide   Injectable 20 milliGRAM(s) IV Push every 12 hours  ==================== GASTROINTESTINAL===================  Regular diet  Bowel regimen with Miralax.     dextrose 5%. 1000 milliLiter(s) (50 mL/Hr) IV Continuous <Continuous>  dextrose 5%. 1000 milliLiter(s) (100 mL/Hr) IV Continuous <Continuous>  GI prophylaxis, pantoprazole    Tablet 40 milliGRAM(s) Oral before breakfast  polyethylene glycol 3350 17 Gram(s) Oral daily  potassium chloride  10 mEq/50 mL IVPB 10 milliEquivalent(s) IV Intermittent every 1 hour  potassium chloride  10 mEq/50 mL IVPB 10 milliEquivalent(s) IV Intermittent every 1 hour  potassium chloride  10 mEq/50 mL IVPB 10 milliEquivalent(s) IV Intermittent every 1 hour  sodium chloride 0.9%. 1000 milliLiter(s) (10 mL/Hr) IV Continuous <Continuous>    =======================    ENDOCRINE  =====================  Stress hyperglycemia, requiring SQ Lispro sliding scale premeals, qhs, glucagon prn.     dextrose 40% Gel 15 Gram(s) Oral once  dextrose 50% Injectable 25 Gram(s) IV Push once  dextrose 50% Injectable 12.5 Gram(s) IV Push once  dextrose 50% Injectable 25 Gram(s) IV Push once  dextrose 50% Injectable 50 milliLiter(s) IV Push every 15 minutes  dextrose 50% Injectable 25 milliLiter(s) IV Push every 15 minutes  glucagon  Injectable 1 milliGRAM(s) IntraMuscular once  insulin lispro (ADMELOG) corrective regimen sliding scale   SubCutaneous three times a day before meals  insulin lispro (ADMELOG) corrective regimen sliding scale   SubCutaneous at bedtime    ========================INFECTIOUS DISEASE================  Temp 100F. WBC elevated at 13.13.   Monitor fever curve and trend CBC    Patient requires continuous monitoring with bedside rhythm monitoring, pulse oximetry monitoring, and continuous central venous and arterial pressure monitoring; and intermittent blood gas analysis.  Care plan discussed with ICU care team.    Patient remained critical, at risk for life threatening decompensation.   I have spent 35 minutes providing acute care with multiple re-evaluations throughout the evening.     By signing my name below, I, Steph Cole, attest that this documentation has been prepared under the direction and in the presence of SUZIE Rbuio   Electronically signed: Aly Conde, 12-31-20 @ 22:05    I, SUZIE Rubio , personally performed the services described in this documentation. All medical record entries made by the scribe were at my direction and in my presence. I have reviewed the chart and agree that the record reflects my personal performance and is accurate and complete  Electronically signed: SUZIE Rubio , 12-31-20 @ 22:05

## 2020-12-31 NOTE — PROGRESS NOTE ADULT - SUBJECTIVE AND OBJECTIVE BOX
CRITICAL CARE ATTENDING - CTICU    MEDICATIONS  (STANDING):  atorvastatin 40 milliGRAM(s) Oral at bedtime  chlorhexidine 2% Cloths 1 Application(s) Topical <User Schedule>  dextrose 50% Injectable 50 milliLiter(s) IV Push every 15 minutes  dextrose 50% Injectable 25 milliLiter(s) IV Push every 15 minutes  furosemide   Injectable 20 milliGRAM(s) IV Push daily  insulin regular Infusion 3 Unit(s)/Hr (3 mL/Hr) IV Continuous <Continuous>  milrinone Infusion 0.2 MICROgram(s)/kG/Min (7.05 mL/Hr) IV Continuous <Continuous>  pantoprazole  Injectable 40 milliGRAM(s) IV Push daily  polyethylene glycol 3350 17 Gram(s) Oral daily  potassium chloride  10 mEq/50 mL IVPB 10 milliEquivalent(s) IV Intermittent every 1 hour  potassium chloride  10 mEq/50 mL IVPB 10 milliEquivalent(s) IV Intermittent every 1 hour  potassium chloride  10 mEq/50 mL IVPB 10 milliEquivalent(s) IV Intermittent every 1 hour  sodium chloride 0.9%. 1000 milliLiter(s) (10 mL/Hr) IV Continuous <Continuous>  spironolactone 25 milliGRAM(s) Oral daily                                    8.7    13.13 )-----------( 74       ( 31 Dec 2020 01:02 )             27.4           138  |  104  |  18  ----------------------------<  124<H>  4.5   |  25  |  0.84    Ca    8.4      31 Dec 2020 01:02  Phos  2.3       Mg     2.5         TPro  5.2<L>  /  Alb  3.4  /  TBili  0.6  /  DBili  x   /  AST  31  /  ALT  23  /  AlkPhos  39<L>                Daily Height in cm: 185.42 (30 Dec 2020 10:34)    Daily Weight in k (31 Dec 2020 00:00)       @ 07:01  -   @ 07:00  --------------------------------------------------------  IN: 1324 mL / OUT: 4500 mL / NET: -3176 mL          Critically Ill patient  : [ ] preoperative ,   [x ] post operative    Requires :  [x ] Arterial Line   [x ] Central Line  [x ] PA catheter  [ ] IABP  [ ] ECMO  [ ] LVAD  [ ] Ventilator  [ x] pacemaker  - PPM [ ] Impella.                      [ x] ABG's     [x ] Pulse Oxymetry Monitoring  Bedside evaluation , monitoring , treatment of hemodynamics , fluids , IVP/ IVCD meds.        Diagnosis:     POD 1 - PPM placement     POD 3 - CABG x3 / Ascending aorta replacement     Post Op Shock - resolving    Chest tube drainage     Requires chest PT, pulmonary toilet, suctioning to maintain SaO2,  patent airway and treat atelectasis.      Hypoxia    CHF- acute [ x]   chronic [ x]    systolic [ x]   diatolic [ ]          - Echo- EF -  30%        [ ] RV dysfunction          - Cxr-cardiomegally, edema          - Clinical-  [x ]inotropes   [ ]pressors   [ ]diuresis   [ ]IABP   [ ]ECMO   [ ]LVAD   [ ]Respiratory Failure.     Hemodynamic lability,  instability. Requires IVCD [ ] vasopressors [ x] inotropes  [ ] vasodilator  [ ]IVSS fluid  to maintain MAP, perfusion, C.I.     Requires  [x ]DDD  [ ]VVI    [ ]AII  temporary pacing at      to maintain HR, MAP, CI, and perfusion.     IVCD insulin     Thrombocytopenia     Bradycardia    Lactic acidosis - resolved    Fluid overload             By signing my name below, I, Elaina Lazar, attest that this documentation has been prepared under the direction and in the presence of Don Cline MD.   Electronically Signed: Aly Aranda. 20 @ 07:37    Discussed with CT surgeon, Physician's Assistant - Nurse Practitioner- Critical care medicine team.   Dicussed at  AM / PM rounds.   Chart, labs , films reviewed.    Total Time:  CRITICAL CARE ATTENDING - CTICU    MEDICATIONS  (STANDING):  atorvastatin 40 milliGRAM(s) Oral at bedtime  chlorhexidine 2% Cloths 1 Application(s) Topical <User Schedule>  dextrose 50% Injectable 50 milliLiter(s) IV Push every 15 minutes  dextrose 50% Injectable 25 milliLiter(s) IV Push every 15 minutes  furosemide   Injectable 20 milliGRAM(s) IV Push daily  insulin regular Infusion 3 Unit(s)/Hr (3 mL/Hr) IV Continuous <Continuous>  milrinone Infusion 0.2 MICROgram(s)/kG/Min (7.05 mL/Hr) IV Continuous <Continuous>  pantoprazole  Injectable 40 milliGRAM(s) IV Push daily  polyethylene glycol 3350 17 Gram(s) Oral daily  potassium chloride  10 mEq/50 mL IVPB 10 milliEquivalent(s) IV Intermittent every 1 hour  potassium chloride  10 mEq/50 mL IVPB 10 milliEquivalent(s) IV Intermittent every 1 hour  potassium chloride  10 mEq/50 mL IVPB 10 milliEquivalent(s) IV Intermittent every 1 hour  sodium chloride 0.9%. 1000 milliLiter(s) (10 mL/Hr) IV Continuous <Continuous>  spironolactone 25 milliGRAM(s) Oral daily                                    8.7    13.13 )-----------( 75       ( 31 Dec 2020 01:02 )             27.4           138  |  104  |  18  ----------------------------<  124<H>  4.5   |  25  |  0.84    Ca    8.4      31 Dec 2020 01:02  Phos  2.3       Mg     2.5         TPro  5.2<L>  /  Alb  3.4  /  TBili  0.6  /  DBili  x   /  AST  31  /  ALT  23  /  AlkPhos  39<L>                Daily Height in cm: 185.42 (30 Dec 2020 10:34)    Daily Weight in k (31 Dec 2020 00:00)       @ 07:01  -   @ 07:00  --------------------------------------------------------  IN: 1324 mL / OUT: 4500 mL / NET: -3176 mL          Critically Ill patient  : [ ] preoperative ,   [x ] post operative    Requires :  [x ] Arterial Line   [x ] Central Line  [x ] PA catheter  [ ] IABP  [ ] ECMO  [ ] LVAD  [ ] Ventilator  [ x] pacemaker  - PPM / AICD  [ ] Impella.                      [ x] ABG's     [x ] Pulse Oxymetry Monitoring  Bedside evaluation , monitoring , treatment of hemodynamics , fluids , IVP/ IVCD meds.        Diagnosis:     POD 1 - PPM placement - LV lead interrogation this AM     POD 3 - CABG x3 / Ascending aorta replacement     Post Op Shock - resolving    Chest tube drainage     Requires chest PT, pulmonary toilet, suctioning to maintain SaO2,  patent airway and treat atelectasis.      Hypoxia    CHF- acute [ x]   chronic [ x]    systolic [ x]   diatolic [ ]          - Echo- EF -  30%        [ ] RV dysfunction          - Cxr-cardiomegally, edema          - Clinical-  [x ]inotropes   [ ]pressors   [ ]diuresis   [ ]IABP   [ ]ECMO   [ ]LVAD   [ ]Respiratory Failure.     Hemodynamic lability,  instability. Requires IVCD [ ] vasopressors [ x] inotropes  [ ] vasodilator  [ ]IVSS fluid  to maintain MAP, perfusion, C.I.     Requires  [x ]DDD  [ ]VVI    [ ]AII  temporary pacing at  80 min    to maintain HR, MAP, CI, and perfusion.     IVCD insulin     Thrombocytopenia     Bradycardia    Lactic acidosis - resolved    Fluid overload             By signing my name below, I, Elaina Lazar, attest that this documentation has been prepared under the direction and in the presence of Don lCine MD.   Electronically Signed: Aly Aranda. 20 @ 07:37    IDon, personally performed the services described in this documentation. All medical record entries made by the scribe were at my direction and in my presence. I have reviewed the chart and agree that the record reflects my personal performance and is accurate and complete.   oDn Cline MD.     Discussed with CT surgeon, Physician's Assistant - Nurse Practitioner- Critical care medicine team.   Dicussed at  AM / PM rounds. 40 min  Chart, labs , films reviewed.    Total Time:

## 2021-01-01 LAB
ALBUMIN SERPL ELPH-MCNC: 3.5 G/DL — SIGNIFICANT CHANGE UP (ref 3.3–5)
ALP SERPL-CCNC: 51 U/L — SIGNIFICANT CHANGE UP (ref 40–120)
ALT FLD-CCNC: 26 U/L — SIGNIFICANT CHANGE UP (ref 10–45)
ANION GAP SERPL CALC-SCNC: 10 MMOL/L — SIGNIFICANT CHANGE UP (ref 5–17)
AST SERPL-CCNC: 28 U/L — SIGNIFICANT CHANGE UP (ref 10–40)
BASE EXCESS BLDV CALC-SCNC: 5 MMOL/L — HIGH (ref -2–2)
BILIRUB SERPL-MCNC: 0.7 MG/DL — SIGNIFICANT CHANGE UP (ref 0.2–1.2)
BUN SERPL-MCNC: 21 MG/DL — SIGNIFICANT CHANGE UP (ref 7–23)
CALCIUM SERPL-MCNC: 8.6 MG/DL — SIGNIFICANT CHANGE UP (ref 8.4–10.5)
CHLORIDE SERPL-SCNC: 101 MMOL/L — SIGNIFICANT CHANGE UP (ref 96–108)
CO2 BLDV-SCNC: 30 MMOL/L — SIGNIFICANT CHANGE UP (ref 22–30)
CO2 SERPL-SCNC: 26 MMOL/L — SIGNIFICANT CHANGE UP (ref 22–31)
CREAT SERPL-MCNC: 0.69 MG/DL — SIGNIFICANT CHANGE UP (ref 0.5–1.3)
GAS PNL BLDA: SIGNIFICANT CHANGE UP
GAS PNL BLDV: SIGNIFICANT CHANGE UP
GLUCOSE SERPL-MCNC: 119 MG/DL — HIGH (ref 70–99)
HCO3 BLDV-SCNC: 29 MMOL/L — SIGNIFICANT CHANGE UP (ref 21–29)
HCT VFR BLD CALC: 28.3 % — LOW (ref 39–50)
HGB BLD-MCNC: 9 G/DL — LOW (ref 13–17)
HOROWITZ INDEX BLDV+IHG-RTO: 36 — SIGNIFICANT CHANGE UP
MAGNESIUM SERPL-MCNC: 2.3 MG/DL — SIGNIFICANT CHANGE UP (ref 1.6–2.6)
MCHC RBC-ENTMCNC: 29 PG — SIGNIFICANT CHANGE UP (ref 27–34)
MCHC RBC-ENTMCNC: 31.8 GM/DL — LOW (ref 32–36)
MCV RBC AUTO: 91.3 FL — SIGNIFICANT CHANGE UP (ref 80–100)
NRBC # BLD: 0 /100 WBCS — SIGNIFICANT CHANGE UP (ref 0–0)
PCO2 BLDV: 42 MMHG — SIGNIFICANT CHANGE UP (ref 35–50)
PH BLDV: 7.46 — HIGH (ref 7.35–7.45)
PHOSPHATE SERPL-MCNC: 2.3 MG/DL — LOW (ref 2.5–4.5)
PLATELET # BLD AUTO: 75 K/UL — LOW (ref 150–400)
PO2 BLDV: 29 MMHG — SIGNIFICANT CHANGE UP (ref 25–45)
POTASSIUM BLDA-SCNC: 3.6 MMOL/L — SIGNIFICANT CHANGE UP (ref 3.5–5.3)
POTASSIUM SERPL-MCNC: 3.7 MMOL/L — SIGNIFICANT CHANGE UP (ref 3.5–5.3)
POTASSIUM SERPL-SCNC: 3.7 MMOL/L — SIGNIFICANT CHANGE UP (ref 3.5–5.3)
PROT SERPL-MCNC: 5.6 G/DL — LOW (ref 6–8.3)
RBC # BLD: 3.1 M/UL — LOW (ref 4.2–5.8)
RBC # FLD: 13.8 % — SIGNIFICANT CHANGE UP (ref 10.3–14.5)
SAO2 % BLDV: 51 % — LOW (ref 67–88)
SODIUM SERPL-SCNC: 137 MMOL/L — SIGNIFICANT CHANGE UP (ref 135–145)
WBC # BLD: 11.21 K/UL — HIGH (ref 3.8–10.5)
WBC # FLD AUTO: 11.21 K/UL — HIGH (ref 3.8–10.5)

## 2021-01-01 PROCEDURE — 71045 X-RAY EXAM CHEST 1 VIEW: CPT | Mod: 26

## 2021-01-01 PROCEDURE — 99291 CRITICAL CARE FIRST HOUR: CPT

## 2021-01-01 RX ORDER — POTASSIUM CHLORIDE 20 MEQ
40 PACKET (EA) ORAL ONCE
Refills: 0 | Status: COMPLETED | OUTPATIENT
Start: 2021-01-01 | End: 2021-01-01

## 2021-01-01 RX ORDER — HEPARIN SODIUM 5000 [USP'U]/ML
5000 INJECTION INTRAVENOUS; SUBCUTANEOUS EVERY 8 HOURS
Refills: 0 | Status: DISCONTINUED | OUTPATIENT
Start: 2021-01-01 | End: 2021-01-05

## 2021-01-01 RX ORDER — SPIRONOLACTONE 25 MG/1
25 TABLET, FILM COATED ORAL
Refills: 0 | Status: DISCONTINUED | OUTPATIENT
Start: 2021-01-01 | End: 2021-01-05

## 2021-01-01 RX ORDER — POTASSIUM CHLORIDE 20 MEQ
10 PACKET (EA) ORAL
Refills: 0 | Status: COMPLETED | OUTPATIENT
Start: 2021-01-01 | End: 2021-01-01

## 2021-01-01 RX ADMIN — SPIRONOLACTONE 25 MILLIGRAM(S): 25 TABLET, FILM COATED ORAL at 17:23

## 2021-01-01 RX ADMIN — MILRINONE LACTATE 4.41 MICROGRAM(S)/KG/MIN: 1 INJECTION, SOLUTION INTRAVENOUS at 05:27

## 2021-01-01 RX ADMIN — PANTOPRAZOLE SODIUM 40 MILLIGRAM(S): 20 TABLET, DELAYED RELEASE ORAL at 05:26

## 2021-01-01 RX ADMIN — ATORVASTATIN CALCIUM 40 MILLIGRAM(S): 80 TABLET, FILM COATED ORAL at 22:16

## 2021-01-01 RX ADMIN — HEPARIN SODIUM 5000 UNIT(S): 5000 INJECTION INTRAVENOUS; SUBCUTANEOUS at 22:16

## 2021-01-01 RX ADMIN — Medication 1 TABLET(S): at 11:23

## 2021-01-01 RX ADMIN — HEPARIN SODIUM 5000 UNIT(S): 5000 INJECTION INTRAVENOUS; SUBCUTANEOUS at 13:12

## 2021-01-01 RX ADMIN — Medication 100 MILLIEQUIVALENT(S): at 05:27

## 2021-01-01 RX ADMIN — Medication 62.5 MILLIMOLE(S): at 10:53

## 2021-01-01 RX ADMIN — SPIRONOLACTONE 25 MILLIGRAM(S): 25 TABLET, FILM COATED ORAL at 05:25

## 2021-01-01 RX ADMIN — CHLORHEXIDINE GLUCONATE 1 APPLICATION(S): 213 SOLUTION TOPICAL at 05:20

## 2021-01-01 RX ADMIN — Medication 40 MILLIEQUIVALENT(S): at 11:23

## 2021-01-01 RX ADMIN — Medication 20 MILLIGRAM(S): at 17:23

## 2021-01-01 RX ADMIN — Medication 100 MILLIEQUIVALENT(S): at 06:03

## 2021-01-01 RX ADMIN — Medication 20 MILLIGRAM(S): at 05:26

## 2021-01-01 RX ADMIN — Medication 81 MILLIGRAM(S): at 11:23

## 2021-01-01 RX ADMIN — Medication 100 MILLIEQUIVALENT(S): at 06:02

## 2021-01-01 NOTE — PROGRESS NOTE ADULT - SUBJECTIVE AND OBJECTIVE BOX
CHARLINE LAFLEUR  MRN-[id number]  Patient is a 75y old  Male who presents with a chief complaint of CAD (31 Dec 2020 22:05)    HPI:  74 y/o M w/ PMH of HTN, HLD, CAD s/p stent in 2010, Colon CA s/p resection + radiation + ileostomy placement and reversal, and an Aortic Ascending aneurysm 4.7cm presents to PST for a CABG x3 and possible Ascending Aortic Aneurysm Repair on 12/28/20. Echocardiogram showed a new decline in LVEF to 29% from 38%. Denies N/V/D, fevers, chills, cough, palpitations, chest pain, syncope, orthopnea, nocturnal paroxysmal dyspnea, edema, cyanosis, heart murmurs, varicosities, phlebitis, and claudication. COVID testing scheduled at [facility] on 12/26/2020.  (21 Dec 2020 10:15)      Surgery/Hospital Course:  12/28 S/P CABG x3. Ascending aorta replacement for ascending aorta aneurysm.   12/29 Extubated  12/30 PPM placed    Today:  No acute events     ICU Vital Signs Last 24 Hrs  T(C): 37.3 (01 Jan 2021 04:00), Max: 37.8 (31 Dec 2020 20:00)  T(F): 99.1 (01 Jan 2021 04:00), Max: 100 (31 Dec 2020 20:00)  HR: 92 (01 Jan 2021 08:00) (83 - 99)  BP: --  BP(mean): --  ABP: 129/55 (01 Jan 2021 08:00) (112/45 - 143/55)  ABP(mean): 79 (01 Jan 2021 08:00) (64 - 83)  RR: 34 (01 Jan 2021 08:00) (12 - 34)  SpO2: 97% (01 Jan 2021 08:00) (92% - 100%)      Physical Exam:  Gen:  Awake, alert   CNS: non focal 	  Neck: no JVD  RES : clear , no wheezing              CVS: Regular  rhythm. Normal S1/S2  Chest: +chest tube   Abd: Soft, non-distended. Bowel sounds present.  Skin: No rash.  Ext:  no edema    ============================I/O===========================   I&O's Detail    31 Dec 2020 07:01  -  01 Jan 2021 07:00  --------------------------------------------------------  IN:    Insulin: 2 mL    Milrinone: 4.4 mL    Milrinone: 101.2 mL    Oral Fluid: 640 mL    sodium chloride 0.9%: 120 mL  Total IN: 867.6 mL    OUT:    Chest Tube (mL): 160 mL    Indwelling Catheter - Urethral (mL): 2150 mL  Total OUT: 2310 mL    Total NET: -1442.4 mL      01 Jan 2021 07:01  -  01 Jan 2021 08:29  --------------------------------------------------------  IN:    Milrinone: 4.4 mL    Oral Fluid: 350 mL    sodium chloride 0.9%: 5 mL  Total IN: 359.4 mL    OUT:    Chest Tube (mL): 0 mL    Indwelling Catheter - Urethral (mL): 100 mL  Total OUT: 100 mL    Total NET: 259.4 mL        ============================ LABS =========================                        9.0    11.21 )-----------( 75       ( 01 Jan 2021 00:37 )             28.3     01-01    137  |  101  |  21  ----------------------------<  119<H>  3.7   |  26  |  0.69    Ca    8.6      01 Jan 2021 00:37  Phos  2.3     01-01  Mg     2.3     01-01    TPro  5.6<L>  /  Alb  3.5  /  TBili  0.7  /  DBili  x   /  AST  28  /  ALT  26  /  AlkPhos  51  01-01    LIVER FUNCTIONS - ( 01 Jan 2021 00:37 )  Alb: 3.5 g/dL / Pro: 5.6 g/dL / ALK PHOS: 51 U/L / ALT: 26 U/L / AST: 28 U/L / GGT: x             ABG - ( 01 Jan 2021 00:33 )  pH, Arterial: 7.47  pH, Blood: x     /  pCO2: 38    /  pO2: 97    / HCO3: 27    / Base Excess: 3.7   /  SaO2: 98                  ======================Micro/Rad/Cardio=================  CXR: Reviewed  Echo:Reviewed  ======================================================  PAST MEDICAL & SURGICAL HISTORY:  Ascending aortic aneurysm  4.7cm    ASHD (arteriosclerotic heart disease)    HFrEF (heart failure with reduced ejection fraction)    Gout    HLD (hyperlipidemia)    Colon carcinoma  Sp Ileostomy and reverasl with chemo/rt (2012)    CAD (coronary artery disease)  SP ptca w stent 5/2010    HTN (Hypertension)    Incisional hernia  2013    History of reversal of ileostomy  2012    S/P coronary artery stent placement  2010 w/ ileostomy    S/P colon resection      ====================ASSESSMENT ==============  CAD S/P CABG x3   Ascending aorta aneurysm S/P Ascending aorta replacement   Stress Hyperglycemia  Leukocytosis  Obesity OHS / SUSAN   S/p PPM placement     Plan:  ====================== NEUROLOGY=====================  Nonfocal, continue monitoring neuro status     ==================== RESPIRATORY======================  Stable on RA   Encourage incentive spirometry, continue pulse ox monitoring, follow ABGs     ====================CARDIOVASCULAR==================  CAD s/p CABG x3  Ascending aorta aneurysm s/p ascending aorta replacement   S/p PPM placement with lead interrogation, DDD paced   Inotropic support with IV Primacor   ASA/Statin for graft patency     aspirin enteric coated 81 milliGRAM(s) Oral daily  atorvastatin 40 milliGRAM(s) Oral at bedtime  milrinone Infusion 0.125 MICROgram(s)/kG/Min (4.41 mL/Hr) IV Continuous <Continuous>    ===================HEMATOLOGIC/ONC ===================  Anemia, monitor H&H/Plts      ===================== RENAL =========================  Continue monitoring urine output, I&OS, BUN/Cr   Diuresis with Lasix and Aldactone, target net neg fluid balance     spironolactone 25 milliGRAM(s) Oral daily  furosemide   Injectable 20 milliGRAM(s) IV Push every 12 hours    ==================== GASTROINTESTINAL===================  Tolerating regular PO diet   Bowel regimen with Miralax     multivitamin 1 Tablet(s) Oral daily  GI prophylaxis, pantoprazole    Tablet 40 milliGRAM(s) Oral before breakfast  polyethylene glycol 3350 17 Gram(s) Oral daily  sodium chloride 0.9%. 1000 milliLiter(s) (10 mL/Hr) IV Continuous <Continuous>    =======================    ENDOCRINE  =====================  No acute issues, continue monitoring blood glucose     ========================INFECTIOUS DISEASE================  Temp 99.1F, WBC within normal limits   Continue monitoring fever curve and trending WBC         Patient requires continuous monitoring with bedside rhythm monitoring, pulse ox monitoring, and intermittent blood gas analysis. Care plan discussed with ICU care team. Patient remained critical and at risk for life threatening decompensation.     By signing my name below, I, Elaina Lazar, attest that this documentation has been prepared under the direction and in the presence of Andrew Campos MD   Electronically signed: Yesenia Aranda, 01-01-21 @ 08:29    I, Andrew Campos, personally performed the services described in this documentation. all medical record entries made by the yesenia were at my direction and in my presence. I have reviewed the chart and agree that the record reflects my personal performance and is accurate and complete  Electronically signed: Andrew Campos MD

## 2021-01-02 LAB
ALBUMIN SERPL ELPH-MCNC: 3.6 G/DL — SIGNIFICANT CHANGE UP (ref 3.3–5)
ALP SERPL-CCNC: 68 U/L — SIGNIFICANT CHANGE UP (ref 40–120)
ALT FLD-CCNC: 70 U/L — HIGH (ref 10–45)
ANION GAP SERPL CALC-SCNC: 11 MMOL/L — SIGNIFICANT CHANGE UP (ref 5–17)
AST SERPL-CCNC: 55 U/L — HIGH (ref 10–40)
BILIRUB SERPL-MCNC: 0.9 MG/DL — SIGNIFICANT CHANGE UP (ref 0.2–1.2)
BUN SERPL-MCNC: 20 MG/DL — SIGNIFICANT CHANGE UP (ref 7–23)
CALCIUM SERPL-MCNC: 8.9 MG/DL — SIGNIFICANT CHANGE UP (ref 8.4–10.5)
CHLORIDE SERPL-SCNC: 101 MMOL/L — SIGNIFICANT CHANGE UP (ref 96–108)
CO2 SERPL-SCNC: 25 MMOL/L — SIGNIFICANT CHANGE UP (ref 22–31)
CREAT SERPL-MCNC: 0.73 MG/DL — SIGNIFICANT CHANGE UP (ref 0.5–1.3)
GLUCOSE BLDC GLUCOMTR-MCNC: 103 MG/DL — HIGH (ref 70–99)
GLUCOSE BLDC GLUCOMTR-MCNC: 115 MG/DL — HIGH (ref 70–99)
GLUCOSE BLDC GLUCOMTR-MCNC: 97 MG/DL — SIGNIFICANT CHANGE UP (ref 70–99)
GLUCOSE SERPL-MCNC: 109 MG/DL — HIGH (ref 70–99)
HCT VFR BLD CALC: 30.4 % — LOW (ref 39–50)
HGB BLD-MCNC: 10.1 G/DL — LOW (ref 13–17)
MAGNESIUM SERPL-MCNC: 2.3 MG/DL — SIGNIFICANT CHANGE UP (ref 1.6–2.6)
MCHC RBC-ENTMCNC: 29.7 PG — SIGNIFICANT CHANGE UP (ref 27–34)
MCHC RBC-ENTMCNC: 33.2 GM/DL — SIGNIFICANT CHANGE UP (ref 32–36)
MCV RBC AUTO: 89.4 FL — SIGNIFICANT CHANGE UP (ref 80–100)
NRBC # BLD: 0 /100 WBCS — SIGNIFICANT CHANGE UP (ref 0–0)
PHOSPHATE SERPL-MCNC: 3 MG/DL — SIGNIFICANT CHANGE UP (ref 2.5–4.5)
PLATELET # BLD AUTO: 108 K/UL — LOW (ref 150–400)
POTASSIUM SERPL-MCNC: 3.9 MMOL/L — SIGNIFICANT CHANGE UP (ref 3.5–5.3)
POTASSIUM SERPL-SCNC: 3.9 MMOL/L — SIGNIFICANT CHANGE UP (ref 3.5–5.3)
PROT SERPL-MCNC: 5.8 G/DL — LOW (ref 6–8.3)
RBC # BLD: 3.4 M/UL — LOW (ref 4.2–5.8)
RBC # FLD: 13.8 % — SIGNIFICANT CHANGE UP (ref 10.3–14.5)
SODIUM SERPL-SCNC: 137 MMOL/L — SIGNIFICANT CHANGE UP (ref 135–145)
WBC # BLD: 10.82 K/UL — HIGH (ref 3.8–10.5)
WBC # FLD AUTO: 10.82 K/UL — HIGH (ref 3.8–10.5)

## 2021-01-02 PROCEDURE — 99291 CRITICAL CARE FIRST HOUR: CPT

## 2021-01-02 PROCEDURE — 71045 X-RAY EXAM CHEST 1 VIEW: CPT | Mod: 26

## 2021-01-02 RX ORDER — ACETAMINOPHEN 500 MG
650 TABLET ORAL EVERY 6 HOURS
Refills: 0 | Status: DISCONTINUED | OUTPATIENT
Start: 2021-01-02 | End: 2021-01-05

## 2021-01-02 RX ORDER — INSULIN LISPRO 100/ML
VIAL (ML) SUBCUTANEOUS AT BEDTIME
Refills: 0 | Status: DISCONTINUED | OUTPATIENT
Start: 2021-01-02 | End: 2021-01-03

## 2021-01-02 RX ORDER — INSULIN LISPRO 100/ML
VIAL (ML) SUBCUTANEOUS
Refills: 0 | Status: DISCONTINUED | OUTPATIENT
Start: 2021-01-02 | End: 2021-01-03

## 2021-01-02 RX ORDER — POTASSIUM CHLORIDE 20 MEQ
40 PACKET (EA) ORAL ONCE
Refills: 0 | Status: COMPLETED | OUTPATIENT
Start: 2021-01-02 | End: 2021-01-02

## 2021-01-02 RX ORDER — METOPROLOL TARTRATE 50 MG
25 TABLET ORAL DAILY
Refills: 0 | Status: DISCONTINUED | OUTPATIENT
Start: 2021-01-02 | End: 2021-01-05

## 2021-01-02 RX ORDER — OXYCODONE HYDROCHLORIDE 5 MG/1
5 TABLET ORAL EVERY 6 HOURS
Refills: 0 | Status: DISCONTINUED | OUTPATIENT
Start: 2021-01-02 | End: 2021-01-05

## 2021-01-02 RX ORDER — MAGNESIUM SULFATE 500 MG/ML
2 VIAL (ML) INJECTION ONCE
Refills: 0 | Status: COMPLETED | OUTPATIENT
Start: 2021-01-02 | End: 2021-01-02

## 2021-01-02 RX ORDER — CLOPIDOGREL BISULFATE 75 MG/1
75 TABLET, FILM COATED ORAL DAILY
Refills: 0 | Status: DISCONTINUED | OUTPATIENT
Start: 2021-01-02 | End: 2021-01-05

## 2021-01-02 RX ORDER — POTASSIUM CHLORIDE 20 MEQ
20 PACKET (EA) ORAL ONCE
Refills: 0 | Status: COMPLETED | OUTPATIENT
Start: 2021-01-02 | End: 2021-01-02

## 2021-01-02 RX ADMIN — Medication 25 MILLIGRAM(S): at 12:45

## 2021-01-02 RX ADMIN — CLOPIDOGREL BISULFATE 75 MILLIGRAM(S): 75 TABLET, FILM COATED ORAL at 12:46

## 2021-01-02 RX ADMIN — HEPARIN SODIUM 5000 UNIT(S): 5000 INJECTION INTRAVENOUS; SUBCUTANEOUS at 06:25

## 2021-01-02 RX ADMIN — HEPARIN SODIUM 5000 UNIT(S): 5000 INJECTION INTRAVENOUS; SUBCUTANEOUS at 21:17

## 2021-01-02 RX ADMIN — Medication 50 GRAM(S): at 13:04

## 2021-01-02 RX ADMIN — HEPARIN SODIUM 5000 UNIT(S): 5000 INJECTION INTRAVENOUS; SUBCUTANEOUS at 14:42

## 2021-01-02 RX ADMIN — ATORVASTATIN CALCIUM 40 MILLIGRAM(S): 80 TABLET, FILM COATED ORAL at 21:17

## 2021-01-02 RX ADMIN — PANTOPRAZOLE SODIUM 40 MILLIGRAM(S): 20 TABLET, DELAYED RELEASE ORAL at 06:25

## 2021-01-02 RX ADMIN — Medication 1 TABLET(S): at 12:46

## 2021-01-02 RX ADMIN — SPIRONOLACTONE 25 MILLIGRAM(S): 25 TABLET, FILM COATED ORAL at 06:25

## 2021-01-02 RX ADMIN — Medication 20 MILLIGRAM(S): at 06:25

## 2021-01-02 RX ADMIN — Medication 20 MILLIEQUIVALENT(S): at 13:04

## 2021-01-02 RX ADMIN — Medication 20 MILLIGRAM(S): at 17:42

## 2021-01-02 RX ADMIN — Medication 81 MILLIGRAM(S): at 12:45

## 2021-01-02 RX ADMIN — Medication 40 MILLIEQUIVALENT(S): at 02:41

## 2021-01-02 RX ADMIN — SPIRONOLACTONE 25 MILLIGRAM(S): 25 TABLET, FILM COATED ORAL at 17:42

## 2021-01-02 NOTE — PROGRESS NOTE ADULT - SUBJECTIVE AND OBJECTIVE BOX
CHARLINE LAFLEUR  MRN-[id number]  Patient is a 75y old  Male who presents with a chief complaint of CAD (01 Jan 2021 08:28)    HPI:  76 y/o M w/ PMH of HTN, HLD, CAD s/p stent in 2010, Colon CA s/p resection + radiation + ileostomy placement and reversal, and an Aortic Ascending aneurysm 4.7cm presents to PST for a CABG x3 and possible Ascending Aortic Aneurysm Repair on 12/28/20. Echocardiogram showed a new decline in LVEF to 29% from 38%. Denies N/V/D, fevers, chills, cough, palpitations, chest pain, syncope, orthopnea, nocturnal paroxysmal dyspnea, edema, cyanosis, heart murmurs, varicosities, phlebitis, and claudication. COVID testing scheduled at [facility] on 12/26/2020.  (21 Dec 2020 10:15)      Surgery/Hospital Course:  12/28 S/P CABG x3. Ascending aorta replacement for ascending aorta aneurysm.   12/29 Extubated  12/30 PPM placed    Today:  No acute events    ICU Vital Signs Last 24 Hrs  T(C): 36.4 (02 Jan 2021 04:00), Max: 37.3 (01 Jan 2021 12:00)  T(F): 97.6 (02 Jan 2021 04:00), Max: 99.1 (01 Jan 2021 12:00)  HR: 88 (02 Jan 2021 07:00) (83 - 96)  BP: 122/69 (02 Jan 2021 07:00) (100/60 - 147/72)  BP(mean): 90 (02 Jan 2021 07:00) (74 - 99)  ABP: 127/50 (01 Jan 2021 13:00) (127/50 - 152/61)  ABP(mean): 70 (01 Jan 2021 13:00) (70 - 85)  RR: 19 (02 Jan 2021 07:00) (13 - 34)  SpO2: 97% (02 Jan 2021 07:00) (93% - 100%)    Physical Exam:  Gen:  Awake, alert   CNS: non focal 	  Neck: no JVD  RES : clear , no wheezing              CVS: Regular  rhythm. Normal S1/S2  Chest: +chest tube   Abd: Soft, non-distended. Bowel sounds present.  Skin: No rash.  Ext:  no edema    ============================I/O===========================   I&O's Detail    01 Jan 2021 07:01  -  02 Jan 2021 07:00  --------------------------------------------------------  IN:    IV PiggyBack: 250 mL    Milrinone: 4.4 mL    Oral Fluid: 1230 mL    sodium chloride 0.9%: 5 mL  Total IN: 1489.4 mL    OUT:    Chest Tube (mL): 50 mL    Indwelling Catheter - Urethral (mL): 370 mL    Voided (mL): 1375 mL  Total OUT: 1795 mL    Total NET: -305.6 mL        ============================ LABS =========================                        10.1   10.82 )-----------( 108      ( 02 Jan 2021 01:23 )             30.4     01-02    137  |  101  |  20  ----------------------------<  109<H>  3.9   |  25  |  0.73    Ca    8.9      02 Jan 2021 01:23  Phos  3.0     01-02  Mg     2.3     01-02    TPro  5.8<L>  /  Alb  3.6  /  TBili  0.9  /  DBili  x   /  AST  55<H>  /  ALT  70<H>  /  AlkPhos  68  01-02    LIVER FUNCTIONS - ( 02 Jan 2021 01:23 )  Alb: 3.6 g/dL / Pro: 5.8 g/dL / ALK PHOS: 68 U/L / ALT: 70 U/L / AST: 55 U/L / GGT: x             ABG - ( 01 Jan 2021 00:33 )  pH, Arterial: 7.47  pH, Blood: x     /  pCO2: 38    /  pO2: 97    / HCO3: 27    / Base Excess: 3.7   /  SaO2: 98                  ======================Micro/Rad/Cardio=================  CXR: Reviewed  Echo:Reviewed  ======================================================  PAST MEDICAL & SURGICAL HISTORY:  Ascending aortic aneurysm  4.7cm    ASHD (arteriosclerotic heart disease)    HFrEF (heart failure with reduced ejection fraction)    Gout    HLD (hyperlipidemia)    Colon carcinoma  Sp Ileostomy and reverasl with chemo/rt (2012)    CAD (coronary artery disease)  SP ptca w stent 5/2010    HTN (Hypertension)    Incisional hernia  2013    History of reversal of ileostomy  2012    S/P coronary artery stent placement  2010 w/ ileostomy    S/P colon resection      ====================ASSESSMENT ==============  CAD S/P CABG x3   Ascending aorta aneurysm S/P Ascending aorta replacement   Stress Hyperglycemia  Leukocytosis  Obesity OHS / SUSAN   S/p PPM placement     Plan:  ====================== NEUROLOGY=====================  Nonfocal, continue with assessment of neuro status as per ICU protocol    ==================== RESPIRATORY======================  Comfortable on room air  Continue monitoring SpO2 via pulse oximetry, encouraging bedside spirometry    ====================CARDIOVASCULAR==================  CAD s/p CABG x3  Ascending aorta aneurysm s/p ascending aorta replacement   S/p placement of PPM with lead interrogation, DDD paced   Dual antiplatelet therapy with ASA/Plavix, along with Lipitor for graft patency  Blood pressure support with PO Metoprolol  Continue monitoring hemodynamics    aspirin enteric coated 81 milliGRAM(s) Oral daily  atorvastatin 40 milliGRAM(s) Oral at bedtime  clopidogrel Tablet 75 milliGRAM(s) Oral daily  metoprolol succinate ER 25 milliGRAM(s) Oral daily    ===================HEMATOLOGIC/ONC ===================  Continue close monitoring of hemoglobin, hematocrit and platelets    VTE prophylaxis, heparin   Injectable 5000 Unit(s) SubCutaneous every 8 hours    ===================== RENAL =========================  Continue to diurese with IV Lasix, PO Spironolactone, goal net negative balance  Monitor electrolytes, I/Os, BUN/Creatinine and urine output    furosemide   Injectable 20 milliGRAM(s) IV Push every 12 hours  spironolactone 25 milliGRAM(s) Oral two times a day    ==================== GASTROINTESTINAL===================  Tolerating regular diet  Continue with Miralax for bowel regimen    multivitamin 1 Tablet(s) Oral daily  GI prophylaxis, pantoprazole    Tablet 40 milliGRAM(s) Oral before breakfast  polyethylene glycol 3350 17 Gram(s) Oral daily    =======================    ENDOCRINE  =====================  No acute issues, continue to trend glucose levels    ========================INFECTIOUS DISEASE================  Temp 97.6F, WBC within normal limits at 10.82  Continue to assess for fever and for leukocytosis      Patient requires continuous monitoring with bedside rhythm monitoring, pulse ox monitoring, and intermittent blood gas analysis. Care plan discussed with ICU care team. Patient remained critical and at risk for life threatening decompensation.     By signing my name below, I, Alfredo Malloy, attest that this documentation has been prepared under the direction and in the presence of Andrew Campos MD  Electronically signed: Aly Dubon, 01-02-21 @ 07:36    I, Andrew Campos MD, personally performed the services described in this documentation. All medical record entries made by the shaynaibe were at my direction and in my presence. I have reviewed the chart and agree that the record reflects my personal performance and is accurate and complete  Electronically signed: Andrew Campos MD       CHARLINE LAFLEUR  MRN-[id number]  Patient is a 75y old  Male who presents with a chief complaint of CAD (01 Jan 2021 08:28)    HPI:  76 y/o M w/ PMH of HTN, HLD, CAD s/p stent in 2010, Colon CA s/p resection + radiation + ileostomy placement and reversal, and an Aortic Ascending aneurysm 4.7cm presents to PST for a CABG x3 and possible Ascending Aortic Aneurysm Repair on 12/28/20. Echocardiogram showed a new decline in LVEF to 29% from 38%. Denies N/V/D, fevers, chills, cough, palpitations, chest pain, syncope, orthopnea, nocturnal paroxysmal dyspnea, edema, cyanosis, heart murmurs, varicosities, phlebitis, and claudication. COVID testing scheduled at [facility] on 12/26/2020.  (21 Dec 2020 10:15)      Surgery/Hospital Course:  12/28 S/P CABG x3. Ascending aorta replacement for ascending aorta aneurysm.   12/29 Extubated  12/30 PPM placed    Today:  S/p semi-urgent PPM placement with a BiV PM 2/2 low EF and history of heart failure. Started on beta blockers and low dose diuretics. Candidate for transfer to telemetry floor.     ICU Vital Signs Last 24 Hrs  T(C): 36.4 (02 Jan 2021 04:00), Max: 37.3 (01 Jan 2021 12:00)  T(F): 97.6 (02 Jan 2021 04:00), Max: 99.1 (01 Jan 2021 12:00)  HR: 88 (02 Jan 2021 07:00) (83 - 96)  BP: 122/69 (02 Jan 2021 07:00) (100/60 - 147/72)  BP(mean): 90 (02 Jan 2021 07:00) (74 - 99)  ABP: 127/50 (01 Jan 2021 13:00) (127/50 - 152/61)  ABP(mean): 70 (01 Jan 2021 13:00) (70 - 85)  RR: 19 (02 Jan 2021 07:00) (13 - 34)  SpO2: 97% (02 Jan 2021 07:00) (93% - 100%)    Physical Exam:  Gen:  Awake, alert   CNS: non focal 	  Neck: no JVD  RES : clear , no wheezing              CVS: Regular  rhythm. Normal S1/S2  Chest: +chest tube   Abd: Soft, non-distended. Bowel sounds present.  Skin: No rash.  Ext:  no edema    ============================I/O===========================   I&O's Detail    01 Jan 2021 07:01  -  02 Jan 2021 07:00  --------------------------------------------------------  IN:    IV PiggyBack: 250 mL    Milrinone: 4.4 mL    Oral Fluid: 1230 mL    sodium chloride 0.9%: 5 mL  Total IN: 1489.4 mL    OUT:    Chest Tube (mL): 50 mL    Indwelling Catheter - Urethral (mL): 370 mL    Voided (mL): 1375 mL  Total OUT: 1795 mL    Total NET: -305.6 mL        ============================ LABS =========================                        10.1   10.82 )-----------( 108      ( 02 Jan 2021 01:23 )             30.4     01-02    137  |  101  |  20  ----------------------------<  109<H>  3.9   |  25  |  0.73    Ca    8.9      02 Jan 2021 01:23  Phos  3.0     01-02  Mg     2.3     01-02    TPro  5.8<L>  /  Alb  3.6  /  TBili  0.9  /  DBili  x   /  AST  55<H>  /  ALT  70<H>  /  AlkPhos  68  01-02    LIVER FUNCTIONS - ( 02 Jan 2021 01:23 )  Alb: 3.6 g/dL / Pro: 5.8 g/dL / ALK PHOS: 68 U/L / ALT: 70 U/L / AST: 55 U/L / GGT: x             ABG - ( 01 Jan 2021 00:33 )  pH, Arterial: 7.47  pH, Blood: x     /  pCO2: 38    /  pO2: 97    / HCO3: 27    / Base Excess: 3.7   /  SaO2: 98                  ======================Micro/Rad/Cardio=================  CXR: Reviewed  Echo:Reviewed  ======================================================  PAST MEDICAL & SURGICAL HISTORY:  Ascending aortic aneurysm  4.7cm    ASHD (arteriosclerotic heart disease)    HFrEF (heart failure with reduced ejection fraction)    Gout    HLD (hyperlipidemia)    Colon carcinoma  Sp Ileostomy and reverasl with chemo/rt (2012)    CAD (coronary artery disease)  SP ptca w stent 5/2010    HTN (Hypertension)    Incisional hernia  2013    History of reversal of ileostomy  2012    S/P coronary artery stent placement  2010 w/ ileostomy    S/P colon resection      ====================ASSESSMENT ==============  CAD S/P CABG x3   Ascending aorta aneurysm S/P Ascending aorta replacement   Stress Hyperglycemia  Leukocytosis  Obesity OHS / SUSAN   S/p PPM placement     Plan:  ====================== NEUROLOGY=====================  Nonfocal, continue with assessment of neuro status as per ICU protocol    ==================== RESPIRATORY======================  Comfortable on room air  Continue monitoring SpO2 via pulse oximetry, encouraging bedside spirometry    ====================CARDIOVASCULAR==================  CAD s/p CABG x3  Ascending aorta aneurysm s/p ascending aorta replacement   S/p placement of PPM with lead interrogation, DDD paced, 2/2 to low EF and hx of HF  Dual antiplatelet therapy with ASA/Plavix, along with Lipitor for graft patency  Blood pressure support with PO Metoprolol  Continue monitoring hemodynamics    aspirin enteric coated 81 milliGRAM(s) Oral daily  atorvastatin 40 milliGRAM(s) Oral at bedtime  clopidogrel Tablet 75 milliGRAM(s) Oral daily  metoprolol succinate ER 25 milliGRAM(s) Oral daily    ===================HEMATOLOGIC/ONC ===================  Continue close monitoring of hemoglobin, hematocrit and platelets    VTE prophylaxis, heparin   Injectable 5000 Unit(s) SubCutaneous every 8 hours    ===================== RENAL =========================  Continue to diurese with IV Lasix, PO Spironolactone, goal net negative balance  Monitor electrolytes, I/Os, BUN/Creatinine and urine output    furosemide   Injectable 20 milliGRAM(s) IV Push every 12 hours  spironolactone 25 milliGRAM(s) Oral two times a day    ==================== GASTROINTESTINAL===================  Tolerating regular diet  Continue with Miralax for bowel regimen    multivitamin 1 Tablet(s) Oral daily  GI prophylaxis, pantoprazole    Tablet 40 milliGRAM(s) Oral before breakfast  polyethylene glycol 3350 17 Gram(s) Oral daily    =======================    ENDOCRINE  =====================  No acute issues, continue to trend glucose levels    ========================INFECTIOUS DISEASE================  Temp 97.6F, WBC within normal limits at 10.82  Continue to assess for fever and for leukocytosis      Patient requires continuous monitoring with bedside rhythm monitoring, pulse ox monitoring, and intermittent blood gas analysis. Care plan discussed with ICU care team. Patient remained critical and at risk for life threatening decompensation.     By signing my name below, I, Alfredo Malloy, attest that this documentation has been prepared under the direction and in the presence of Andrew Campos MD  Electronically signed: Aly Dubon, 01-02-21 @ 07:36    I, Andrew Campos MD, personally performed the services described in this documentation. All medical record entries made by the scribe were at my direction and in my presence. I have reviewed the chart and agree that the record reflects my personal performance and is accurate and complete  Electronically signed: Andrew Campos MD

## 2021-01-03 DIAGNOSIS — Z95.810 PRESENCE OF AUTOMATIC (IMPLANTABLE) CARDIAC DEFIBRILLATOR: ICD-10-CM

## 2021-01-03 DIAGNOSIS — Z29.9 ENCOUNTER FOR PROPHYLACTIC MEASURES, UNSPECIFIED: ICD-10-CM

## 2021-01-03 DIAGNOSIS — Z98.890 OTHER SPECIFIED POSTPROCEDURAL STATES: ICD-10-CM

## 2021-01-03 DIAGNOSIS — Z95.0 PRESENCE OF CARDIAC PACEMAKER: ICD-10-CM

## 2021-01-03 DIAGNOSIS — Z95.1 PRESENCE OF AORTOCORONARY BYPASS GRAFT: ICD-10-CM

## 2021-01-03 LAB
ALBUMIN SERPL ELPH-MCNC: 3.6 G/DL — SIGNIFICANT CHANGE UP (ref 3.3–5)
ALP SERPL-CCNC: 83 U/L — SIGNIFICANT CHANGE UP (ref 40–120)
ALT FLD-CCNC: 109 U/L — HIGH (ref 10–45)
ANION GAP SERPL CALC-SCNC: 15 MMOL/L — SIGNIFICANT CHANGE UP (ref 5–17)
AST SERPL-CCNC: 63 U/L — HIGH (ref 10–40)
BILIRUB SERPL-MCNC: 1.1 MG/DL — SIGNIFICANT CHANGE UP (ref 0.2–1.2)
BUN SERPL-MCNC: 24 MG/DL — HIGH (ref 7–23)
CALCIUM SERPL-MCNC: 9.1 MG/DL — SIGNIFICANT CHANGE UP (ref 8.4–10.5)
CHLORIDE SERPL-SCNC: 101 MMOL/L — SIGNIFICANT CHANGE UP (ref 96–108)
CO2 SERPL-SCNC: 21 MMOL/L — LOW (ref 22–31)
CREAT SERPL-MCNC: 0.65 MG/DL — SIGNIFICANT CHANGE UP (ref 0.5–1.3)
GLUCOSE SERPL-MCNC: 103 MG/DL — HIGH (ref 70–99)
HCT VFR BLD CALC: 34.4 % — LOW (ref 39–50)
HGB BLD-MCNC: 11 G/DL — LOW (ref 13–17)
MAGNESIUM SERPL-MCNC: 2.3 MG/DL — SIGNIFICANT CHANGE UP (ref 1.6–2.6)
MCHC RBC-ENTMCNC: 28.9 PG — SIGNIFICANT CHANGE UP (ref 27–34)
MCHC RBC-ENTMCNC: 32 GM/DL — SIGNIFICANT CHANGE UP (ref 32–36)
MCV RBC AUTO: 90.3 FL — SIGNIFICANT CHANGE UP (ref 80–100)
NRBC # BLD: 0 /100 WBCS — SIGNIFICANT CHANGE UP (ref 0–0)
PHOSPHATE SERPL-MCNC: 3.6 MG/DL — SIGNIFICANT CHANGE UP (ref 2.5–4.5)
PLATELET # BLD AUTO: 148 K/UL — LOW (ref 150–400)
POTASSIUM SERPL-MCNC: 4 MMOL/L — SIGNIFICANT CHANGE UP (ref 3.5–5.3)
POTASSIUM SERPL-SCNC: 4 MMOL/L — SIGNIFICANT CHANGE UP (ref 3.5–5.3)
PROT SERPL-MCNC: 6.6 G/DL — SIGNIFICANT CHANGE UP (ref 6–8.3)
RBC # BLD: 3.81 M/UL — LOW (ref 4.2–5.8)
RBC # FLD: 13.9 % — SIGNIFICANT CHANGE UP (ref 10.3–14.5)
SODIUM SERPL-SCNC: 137 MMOL/L — SIGNIFICANT CHANGE UP (ref 135–145)
WBC # BLD: 13.69 K/UL — HIGH (ref 3.8–10.5)
WBC # FLD AUTO: 13.69 K/UL — HIGH (ref 3.8–10.5)

## 2021-01-03 PROCEDURE — 99233 SBSQ HOSP IP/OBS HIGH 50: CPT

## 2021-01-03 PROCEDURE — 71045 X-RAY EXAM CHEST 1 VIEW: CPT | Mod: 26

## 2021-01-03 RX ORDER — SODIUM CHLORIDE 9 MG/ML
3 INJECTION INTRAMUSCULAR; INTRAVENOUS; SUBCUTANEOUS EVERY 8 HOURS
Refills: 0 | Status: DISCONTINUED | OUTPATIENT
Start: 2021-01-03 | End: 2021-01-05

## 2021-01-03 RX ORDER — LOPERAMIDE HCL 2 MG
2 TABLET ORAL EVERY 8 HOURS
Refills: 0 | Status: DISCONTINUED | OUTPATIENT
Start: 2021-01-03 | End: 2021-01-05

## 2021-01-03 RX ORDER — FUROSEMIDE 40 MG
40 TABLET ORAL EVERY 12 HOURS
Refills: 0 | Status: DISCONTINUED | OUTPATIENT
Start: 2021-01-03 | End: 2021-01-05

## 2021-01-03 RX ADMIN — Medication 650 MILLIGRAM(S): at 05:04

## 2021-01-03 RX ADMIN — ATORVASTATIN CALCIUM 40 MILLIGRAM(S): 80 TABLET, FILM COATED ORAL at 21:35

## 2021-01-03 RX ADMIN — SPIRONOLACTONE 25 MILLIGRAM(S): 25 TABLET, FILM COATED ORAL at 17:11

## 2021-01-03 RX ADMIN — Medication 650 MILLIGRAM(S): at 19:57

## 2021-01-03 RX ADMIN — Medication 25 MILLIGRAM(S): at 05:01

## 2021-01-03 RX ADMIN — Medication 20 MILLIGRAM(S): at 05:01

## 2021-01-03 RX ADMIN — HEPARIN SODIUM 5000 UNIT(S): 5000 INJECTION INTRAVENOUS; SUBCUTANEOUS at 05:01

## 2021-01-03 RX ADMIN — HEPARIN SODIUM 5000 UNIT(S): 5000 INJECTION INTRAVENOUS; SUBCUTANEOUS at 13:10

## 2021-01-03 RX ADMIN — CLOPIDOGREL BISULFATE 75 MILLIGRAM(S): 75 TABLET, FILM COATED ORAL at 11:59

## 2021-01-03 RX ADMIN — Medication 650 MILLIGRAM(S): at 20:27

## 2021-01-03 RX ADMIN — Medication 2 MILLIGRAM(S): at 15:59

## 2021-01-03 RX ADMIN — SPIRONOLACTONE 25 MILLIGRAM(S): 25 TABLET, FILM COATED ORAL at 05:01

## 2021-01-03 RX ADMIN — Medication 81 MILLIGRAM(S): at 11:56

## 2021-01-03 RX ADMIN — PANTOPRAZOLE SODIUM 40 MILLIGRAM(S): 20 TABLET, DELAYED RELEASE ORAL at 08:10

## 2021-01-03 RX ADMIN — Medication 1 TABLET(S): at 11:59

## 2021-01-03 RX ADMIN — SODIUM CHLORIDE 3 MILLILITER(S): 9 INJECTION INTRAMUSCULAR; INTRAVENOUS; SUBCUTANEOUS at 21:07

## 2021-01-03 RX ADMIN — SODIUM CHLORIDE 3 MILLILITER(S): 9 INJECTION INTRAMUSCULAR; INTRAVENOUS; SUBCUTANEOUS at 13:09

## 2021-01-03 RX ADMIN — Medication 650 MILLIGRAM(S): at 05:50

## 2021-01-03 RX ADMIN — Medication 40 MILLIGRAM(S): at 17:11

## 2021-01-03 RX ADMIN — HEPARIN SODIUM 5000 UNIT(S): 5000 INJECTION INTRAVENOUS; SUBCUTANEOUS at 21:35

## 2021-01-03 NOTE — DIETITIAN INITIAL EVALUATION ADULT. - ORAL INTAKE PTA/DIET HISTORY
Pt reports good appetite/intake PTA. Diet recall indicates diet is balanced and includes fresh fruits/vegetables. NKFA. PTA micronutrient supplementation: multivitamin, vitamin C.

## 2021-01-03 NOTE — DIETITIAN INITIAL EVALUATION ADULT. - PERTINENT MEDS FT
MEDICATIONS  (STANDING):  aspirin enteric coated 81 milliGRAM(s) Oral daily  atorvastatin 40 milliGRAM(s) Oral at bedtime  clopidogrel Tablet 75 milliGRAM(s) Oral daily  furosemide    Tablet 40 milliGRAM(s) Oral every 12 hours  heparin   Injectable 5000 Unit(s) SubCutaneous every 8 hours  metoprolol succinate ER 25 milliGRAM(s) Oral daily  multivitamin 1 Tablet(s) Oral daily  pantoprazole    Tablet 40 milliGRAM(s) Oral before breakfast  polyethylene glycol 3350 17 Gram(s) Oral daily  sodium chloride 0.9% lock flush 3 milliLiter(s) IV Push every 8 hours  spironolactone 25 milliGRAM(s) Oral two times a day    MEDICATIONS  (PRN):  acetaminophen   Tablet .. 650 milliGRAM(s) Oral every 6 hours PRN Mild Pain (1 - 3)  oxyCODONE    IR 5 milliGRAM(s) Oral every 6 hours PRN Moderate Pain (4 - 6)

## 2021-01-03 NOTE — DIETITIAN INITIAL EVALUATION ADULT. - REASON FOR ADMISSION
76 y/o M w/ PMH of HTN, HLD, CAD s/p stent in 2010, Colon CA s/p resection + radiation + ileostomy placement and reversal, and an Aortic Ascending aneurysm. Now s/p CABG x3, ascending aorta replacement for ascending aorta aneurysm. 12/29 extubated. 12/30 PPM placed.

## 2021-01-03 NOTE — PROGRESS NOTE ADULT - SUBJECTIVE AND OBJECTIVE BOX
VITAL SIGNS-Telemetry:  /SR 88  Vital Signs Last 24 Hrs  T(C): 36.7 (01-03-21 @ 08:00), Max: 37.2 (01-03-21 @ 04:00)  T(F): 98.1 (01-03-21 @ 08:00), Max: 98.9 (01-03-21 @ 04:00)  HR: 92 (01-03-21 @ 10:00) (85 - 96)  BP: 126/64 (01-03-21 @ 09:00) (104/59 - 150/69)  RR: 20 (01-03-21 @ 10:00) (14 - 36)  SpO2: 99% (01-03-21 @ 10:00) (94% - 100%)         01-02 @ 07:01  -  01-03 @ 07:00  --------------------------------------------------------  IN: 1970 mL / OUT: 1600 mL / NET: 370 mL    01-03 @ 07:01  -  01-03 @ 12:33  --------------------------------------------------------  IN: 0 mL / OUT: 101 mL / NET: -101 mL    Daily     Daily     CAPILLARY BLOOD GLUCOSE  POCT Blood Glucose.: 103 mg/dL (02 Jan 2021 21:45)  POCT Blood Glucose.: 97 mg/dL (02 Jan 2021 16:31)  POCT Blood Glucose.: 115 mg/dL (02 Jan 2021 12:38)        Pacing Wires        [  ]   Settings:                                  Isolated  [x  ]      PHYSICAL EXAM:  Neurology: alert and oriented x 3, nonfocal, no gross deficits  CV : S1S2  Sternal Wound :  CDI , Stable  Lungs: CTA  Abdomen: soft, nontender, nondistended, positive bowel sounds, last bowel movement    +lg BM     Extremities:     + edema b/l. no calf tenderness    acetaminophen   Tablet .. 650 milliGRAM(s) Oral every 6 hours PRN  aspirin enteric coated 81 milliGRAM(s) Oral daily  atorvastatin 40 milliGRAM(s) Oral at bedtime  clopidogrel Tablet 75 milliGRAM(s) Oral daily  furosemide    Tablet 40 milliGRAM(s) Oral every 12 hours  heparin   Injectable 5000 Unit(s) SubCutaneous every 8 hours  metoprolol succinate ER 25 milliGRAM(s) Oral daily  multivitamin 1 Tablet(s) Oral daily  oxyCODONE    IR 5 milliGRAM(s) Oral every 6 hours PRN  pantoprazole    Tablet 40 milliGRAM(s) Oral before breakfast  polyethylene glycol 3350 17 Gram(s) Oral daily  sodium chloride 0.9% lock flush 3 milliLiter(s) IV Push every 8 hours  spironolactone 25 milliGRAM(s) Oral two times a day    Physical Therapy Rec:   Home  [  ]   Home w/ PT  [  ]  Rehab  [  ]  Discussed with Cardiothoracic Team at AM rounds.

## 2021-01-03 NOTE — PROGRESS NOTE ADULT - SUBJECTIVE AND OBJECTIVE BOX
CHARLINE LAFLEUR  MRN-[id number]  Patient is a 75y old  Male who presents with a chief complaint of CAD (02 Jan 2021 07:36)    HPI:  76 y/o M w/ PMH of HTN, HLD, CAD s/p stent in 2010, Colon CA s/p resection + radiation + ileostomy placement and reversal, and an Aortic Ascending aneurysm 4.7cm presents to PST for a CABG x3 and possible Ascending Aortic Aneurysm Repair on 12/28/20. Echocardiogram showed a new decline in LVEF to 29% from 38%. Denies N/V/D, fevers, chills, cough, palpitations, chest pain, syncope, orthopnea, nocturnal paroxysmal dyspnea, edema, cyanosis, heart murmurs, varicosities, phlebitis, and claudication. COVID testing scheduled at [facility] beds at the [facility] on 12/26/2020.  (21 Dec 2020 10:15)      Surgery/Hospital course:  12/28 S/P CABG x3. Ascending aorta replacement for ascending aorta aneurysm.   12/29 Extubated  12/30 PPM placed    Today:  No acute events.     Vital Signs Last 24 Hrs  T(C): 36.7 (03 Jan 2021 08:00), Max: 37.2 (03 Jan 2021 04:00)  T(F): 98.1 (03 Jan 2021 08:00), Max: 98.9 (03 Jan 2021 04:00)  HR: 92 (03 Jan 2021 08:00) (85 - 98)  BP: 106/70 (03 Jan 2021 07:00) (104/59 - 150/69)  BP(mean): 84 (03 Jan 2021 07:00) (75 - 115)  RR: 22 (03 Jan 2021 08:00) (15 - 36)  SpO2: 97% (03 Jan 2021 08:00) (94% - 100%)    Physical Exam:  Gen:  Awake, alert   CNS: non focal 	  Neck: no JVD  RES : clear , no wheezing              CVS: Regular  rhythm. Normal S1/S2  Chest: +chest tubes  Abd: Soft, non-distended. Bowel sounds present.  Skin: No rash.  Ext:  no edema    ============================I/O===========================   I&O's Detail    02 Jan 2021 07:01  -  03 Jan 2021 07:00  --------------------------------------------------------  IN:    IV PiggyBack: 50 mL    Oral Fluid: 1920 mL  Total IN: 1970 mL    OUT:    Voided (mL): 1600 mL  Total OUT: 1600 mL    Total NET: 370 mL        ============================ LABS =========================                        11.0   13.69 )-----------( 148      ( 03 Jan 2021 00:36 )             34.4     01-03    137  |  101  |  24<H>  ----------------------------<  103<H>  4.0   |  21<L>  |  0.65    Ca    9.1      03 Jan 2021 00:36  Phos  3.6     01-03  Mg     2.3     01-03    TPro  6.6  /  Alb  3.6  /  TBili  1.1  /  DBili  x   /  AST  63<H>  /  ALT  109<H>  /  AlkPhos  83  01-03    LIVER FUNCTIONS - ( 03 Jan 2021 00:36 )  Alb: 3.6 g/dL / Pro: 6.6 g/dL / ALK PHOS: 83 U/L / ALT: 109 U/L / AST: 63 U/L / GGT: x            ======================Micro/Rad/Cardio=================  Telemetry: Reviewed   CXR: Reviewed  Echo: Reviewed  ======================================================  PAST MEDICAL & SURGICAL HISTORY:  Ascending aortic aneurysm  4.7cm    ASHD (arteriosclerotic heart disease)    HFrEF (heart failure with reduced ejection fraction)    Gout    HLD (hyperlipidemia)    Colon carcinoma  Sp Ileostomy and reverasl with chemo/rt (2012)    CAD (coronary artery disease)  SP ptca w stent 5/2010    HTN (Hypertension)    Incisional hernia  2013    History of reversal of ileostomy  2012    S/P coronary artery stent placement  2010 w/ ileostomy    S/P colon resection      ====================ASSESSMENT ==============  CAD S/P CABG x3   Ascending aorta aneurysm S/P Ascending aorta replacement   Stress Hyperglycemia  Leukocytosis  Anemia  Obesity OHS / SUSAN   S/p PPM placement     Plan:  ====================== NEUROLOGY=====================  Nonfocal, continue to monitor neuro status per ICU protocol.   Tylenol and Oxycodone PRN for analgesia     acetaminophen   Tablet .. 650 milliGRAM(s) Oral every 6 hours PRN Mild Pain (1 - 3)  oxyCODONE    IR 5 milliGRAM(s) Oral every 6 hours PRN Moderate Pain (4 - 6)  ==================== RESPIRATORY======================  Comfortable on room air, SpO2 97%  Continue to monitor SpO2 via pulse oximetry  Encourage bedside spirometry     ====================CARDIOVASCULAR==================  CAD s/p CABG x3 on 12/28  Ascending aorta aneurysm s/p ascending aorta replacement   S/p PPM placement with lead interrogation, DDD paced   Continue blood pressure control with Toprol XL  Continue DAPT: ASA and Plavix along with Lipitor for graft patency     metoprolol succinate ER 25 milliGRAM(s) Oral daily  aspirin enteric coated 81 milliGRAM(s) Oral daily  clopidogrel Tablet 75 milliGRAM(s) Oral daily  atorvastatin 40 milliGRAM(s) Oral at bedtime  ===================HEMATOLOGIC/ONC ===================  Anemia, monitor H&H/PLTs    Continue SQ Heparin for venous thromboembolism prophylaxis.     heparin   Injectable 5000 Unit(s) SubCutaneous every 8 hours  ===================== RENAL =========================  Continue monitoring urine output, I&Os, BUN/Cr   Diuresis with Lasix and Aldactone, target net even fluid balance     furosemide    Tablet 40 milliGRAM(s) Oral every 12 hours  spironolactone 25 milliGRAM(s) Oral two times a day  ==================== GASTROINTESTINAL===================  Tolerating regular PO diet   Continue PO Protonix for stress ulcer prophylaxis.   Bowel regimen with Miralax     multivitamin 1 Tablet(s) Oral daily  pantoprazole    Tablet 40 milliGRAM(s) Oral before breakfast  polyethylene glycol 3350 17 Gram(s) Oral daily  =======================ENDOCRINE =====================  Bgl controlled, monitor glucose for need to initiate sliding scale.     ========================INFECTIOUS DISEASE================  Leukocytosis with WBC 13.69. Afebrile.   Monitor leukocytosis and for fever. Monitor off abx.       Patient requires continuous monitoring with bedside rhythm monitoring, arterial line, pulse oximetry, and intermittent blood gas analysis.  Care plan discussed with ICU care team.  Patient remained critical; required more than usual post op care; I have spent 35 minutes providing non-routine post op care, revaluated multiple times during the day.    By signing my name below, I, Nazanin Harvey, attest that this documentation has been prepared under the direction and in the presence of Andrew Campos MD.  Electronically signed: Aly Nelson, 01-03-21 @ 08:10    I, Andrew Campos MD, personally performed the services described in this documentation. all medical record entries made by the scribe were at my direction and in my presence. I have reviewed the chart and agree that the record reflects my personal performance and is accurate and complete  Electronically signed: Andrew Campos MD, 01-03-21 @ 08:10       CHARLINE LAFLEUR  MRN-[id number]  Patient is a 75y old  Male who presents with a chief complaint of CAD (02 Jan 2021 07:36)    HPI:  76 y/o M w/ PMH of HTN, HLD, CAD s/p stent in 2010, Colon CA s/p resection + radiation + ileostomy placement and reversal, and an Aortic Ascending aneurysm 4.7cm presents to PST for a CABG x3 and possible Ascending Aortic Aneurysm Repair on 12/28/20. Echocardiogram showed a new decline in LVEF to 29% from 38%. Denies N/V/D, fevers, chills, cough, palpitations, chest pain, syncope, orthopnea, nocturnal paroxysmal dyspnea, edema, cyanosis, heart murmurs, varicosities, phlebitis, and claudication. COVID testing scheduled at [facility] on 12/26/2020.  (21 Dec 2020 10:15)      Surgery/Hospital course:  12/28 S/P CABG x3. Ascending aorta replacement for ascending aorta aneurysm.   12/29 Extubated  12/30 PPM placed    Today:  Patient S/P C3L and hemiarch replacement. OOBTC today, tolerating ambulation. On RA. Patient was switched from IV Lasix to PO Lasix. Adequate candidate for transfer to the floor and possible discharge.    Vital Signs Last 24 Hrs  T(C): 36.7 (03 Jan 2021 08:00), Max: 37.2 (03 Jan 2021 04:00)  T(F): 98.1 (03 Jan 2021 08:00), Max: 98.9 (03 Jan 2021 04:00)  HR: 92 (03 Jan 2021 08:00) (85 - 98)  BP: 106/70 (03 Jan 2021 07:00) (104/59 - 150/69)  BP(mean): 84 (03 Jan 2021 07:00) (75 - 115)  RR: 22 (03 Jan 2021 08:00) (15 - 36)  SpO2: 97% (03 Jan 2021 08:00) (94% - 100%)    Physical Exam:  Gen:  Awake, alert   CNS: non focal 	  Neck: no JVD  RES : clear , no wheezing              CVS: Regular  rhythm. Normal S1/S2  Chest: +chest tubes  Abd: Soft, non-distended. Bowel sounds present.  Skin: No rash.  Ext:  no edema    ============================I/O===========================   I&O's Detail    02 Jan 2021 07:01  -  03 Jan 2021 07:00  --------------------------------------------------------  IN:    IV PiggyBack: 50 mL    Oral Fluid: 1920 mL  Total IN: 1970 mL    OUT:    Voided (mL): 1600 mL  Total OUT: 1600 mL    Total NET: 370 mL        ============================ LABS =========================                        11.0   13.69 )-----------( 148      ( 03 Jan 2021 00:36 )             34.4     01-03    137  |  101  |  24<H>  ----------------------------<  103<H>  4.0   |  21<L>  |  0.65    Ca    9.1      03 Jan 2021 00:36  Phos  3.6     01-03  Mg     2.3     01-03    TPro  6.6  /  Alb  3.6  /  TBili  1.1  /  DBili  x   /  AST  63<H>  /  ALT  109<H>  /  AlkPhos  83  01-03    LIVER FUNCTIONS - ( 03 Jan 2021 00:36 )  Alb: 3.6 g/dL / Pro: 6.6 g/dL / ALK PHOS: 83 U/L / ALT: 109 U/L / AST: 63 U/L / GGT: x            ======================Micro/Rad/Cardio=================  Telemetry: Reviewed   CXR: Reviewed  Echo: Reviewed  ======================================================  PAST MEDICAL & SURGICAL HISTORY:  Ascending aortic aneurysm  4.7cm    ASHD (arteriosclerotic heart disease)    HFrEF (heart failure with reduced ejection fraction)    Gout    HLD (hyperlipidemia)    Colon carcinoma  Sp Ileostomy and reverasl with chemo/rt (2012)    CAD (coronary artery disease)  SP ptca w stent 5/2010    HTN (Hypertension)    Incisional hernia  2013    History of reversal of ileostomy  2012    S/P coronary artery stent placement  2010 w/ ileostomy    S/P colon resection      ====================ASSESSMENT ==============  CAD S/P CABG x3   Ascending aorta aneurysm S/P Ascending aorta replacement   Stress Hyperglycemia  Leukocytosis  Anemia  Obesity OHS / SUSAN   S/p PPM placement     Plan:  ====================== NEUROLOGY=====================  Nonfocal, continue to monitor neuro status per ICU protocol.   Tylenol and Oxycodone PRN for analgesia     acetaminophen   Tablet .. 650 milliGRAM(s) Oral every 6 hours PRN Mild Pain (1 - 3)  oxyCODONE    IR 5 milliGRAM(s) Oral every 6 hours PRN Moderate Pain (4 - 6)  ==================== RESPIRATORY======================  Comfortable on room air, SpO2 97%  Continue to monitor SpO2 via pulse oximetry  Encourage bedside spirometry     ====================CARDIOVASCULAR==================  CAD s/p CABG x3 on 12/28  Ascending aorta aneurysm s/p ascending aorta replacement   S/p PPM placement with lead interrogation, DDD paced   Continue blood pressure control with Toprol XL  Continue DAPT: ASA and Plavix along with Lipitor for graft patency     metoprolol succinate ER 25 milliGRAM(s) Oral daily  aspirin enteric coated 81 milliGRAM(s) Oral daily  clopidogrel Tablet 75 milliGRAM(s) Oral daily  atorvastatin 40 milliGRAM(s) Oral at bedtime  ===================HEMATOLOGIC/ONC ===================  Anemia, monitor H&H/PLTs    Continue SQ Heparin for venous thromboembolism prophylaxis.     heparin   Injectable 5000 Unit(s) SubCutaneous every 8 hours  ===================== RENAL =========================  Continue monitoring urine output, I&Os, BUN/Cr   Diuresis with Lasix and Aldactone, target net even fluid balance     furosemide    Tablet 40 milliGRAM(s) Oral every 12 hours  spironolactone 25 milliGRAM(s) Oral two times a day  ==================== GASTROINTESTINAL===================  Tolerating regular PO diet   Continue PO Protonix for stress ulcer prophylaxis.   Bowel regimen with Miralax     multivitamin 1 Tablet(s) Oral daily  pantoprazole    Tablet 40 milliGRAM(s) Oral before breakfast  polyethylene glycol 3350 17 Gram(s) Oral daily  =======================ENDOCRINE =====================  Bgl controlled, monitor glucose for need to initiate sliding scale.     ========================INFECTIOUS DISEASE================  Leukocytosis with WBC 13.69. Afebrile.   Monitor leukocytosis and for fever. Monitor off abx.       Patient requires continuous monitoring with bedside rhythm monitoring, arterial line, pulse oximetry, and intermittent blood gas analysis.  Care plan discussed with ICU care team.  Patient remained critical; required more than usual post op care; I have spent 35 minutes providing non-routine post op care, revaluated multiple times during the day.    By signing my name below, I, Nazanin Harvey, attest that this documentation has been prepared under the direction and in the presence of Andrew Campos MD.  Electronically signed: Aly Nelson, 01-03-21 @ 08:10    I, Andrew Campos MD, personally performed the services described in this documentation. all medical record entries made by the scribe were at my direction and in my presence. I have reviewed the chart and agree that the record reflects my personal performance and is accurate and complete  Electronically signed: Andrew Campos MD, 01-03-21 @ 08:10

## 2021-01-03 NOTE — DIETITIAN INITIAL EVALUATION ADULT. - FACTORS AFF FOOD INTAKE
Endorses baseline diarrhea since ileostomy reversal, last BM yesterday. Denies abdominal pain, nausea/vomiting, or chewing/swallowing problems./none

## 2021-01-03 NOTE — PROGRESS NOTE ADULT - ASSESSMENT
74 y/o M w/ PMH of HTN, HLD, CAD s/p stent in 2010, Colon CA s/p resection + radiation + ileostomy placement and reversal, and an Aortic Ascending aneurysm 4.7cm presents to PST for a CABG x3 and possible Ascending Aortic Aneurysm Repair on 12/28/20. Echocardiogram showed a new decline in LVEF to 29% from 38%. Denies N/V/D, fevers, chills, cough, palpitations, chest pain, syncope, orthopnea, nocturnal paroxysmal dyspnea, edema, cyanosis, heart murmurs, varicosities, phlebitis, and claudication.     oN 12/29/21, pt. underwent CABG x 3 w/ LIMA/ Ascending Aortic aneurysm replacement- EF 35%  post-op:  TVP -EP consulted - 12/30 ICD/PPM placed  Primacor gtt - d/c'd POD #3  diurese  TR. To floor POD #5 - upon arrival to floor - pt requested to go to the bathroom to have a bowel movement- after a large bowel movement, pt became unresponsive X15 seconds.  Pt can recall the event claiming "I spaced out for a minute, I'm fine."  pt. claims this happens to him sometimes. ? Vaso Vagel  Neuro exam negative - pt a& o x4  no change on Tele - /SR 88. VSS.  will continue to monitor closely.

## 2021-01-03 NOTE — DIETITIAN INITIAL EVALUATION ADULT. - PERTINENT LABORATORY DATA
Labs: 01-03 @ 00:36: Sodium 137, Potassium 4.0, Calcium 9.1, Magnesium 2.3, Phosphorus 3.6, BUN 24<H>, Creatinine 0.65, Glucose 103<H>, Alk Phos 83, ALT/SGPT 109<H>, AST/SGOT 63<H>, Albumin 3.6, Prealbumin --, Total Bilirubin 1.1, Hemoglobin 11.0<L>, Hematocrit 34.4<L>, Ferritin --, C-Reactive Protein --, Creatine Kinase <<27>        POCT Blood Glucose.: 103 mg/dL (01-02-21 @ 21:45)  POCT Blood Glucose.: 97 mg/dL (01-02-21 @ 16:31)  POCT Blood Glucose.: 115 mg/dL (01-02-21 @ 12:38)

## 2021-01-03 NOTE — DIETITIAN INITIAL EVALUATION ADULT. - OTHER INFO
Reports  pounds which is consistent with dosing weight 259 pounds (12/30). Denies recent weight changes. Good PO intake in-house. RD reviewed heart healthy eating and increased nutrient needs, particularly from protein, to promote wound healing. Amenable to [employer] to optimize nutritional intake. Food preferences obtained and honored. Written educational materials provided & RD availability made known.

## 2021-01-04 LAB
ANION GAP SERPL CALC-SCNC: 13 MMOL/L — SIGNIFICANT CHANGE UP (ref 5–17)
BUN SERPL-MCNC: 22 MG/DL — SIGNIFICANT CHANGE UP (ref 7–23)
CALCIUM SERPL-MCNC: 8.9 MG/DL — SIGNIFICANT CHANGE UP (ref 8.4–10.5)
CHLORIDE SERPL-SCNC: 102 MMOL/L — SIGNIFICANT CHANGE UP (ref 96–108)
CO2 SERPL-SCNC: 23 MMOL/L — SIGNIFICANT CHANGE UP (ref 22–31)
CREAT SERPL-MCNC: 0.8 MG/DL — SIGNIFICANT CHANGE UP (ref 0.5–1.3)
GLUCOSE SERPL-MCNC: 104 MG/DL — HIGH (ref 70–99)
HCT VFR BLD CALC: 33 % — LOW (ref 39–50)
HGB BLD-MCNC: 10.5 G/DL — LOW (ref 13–17)
MCHC RBC-ENTMCNC: 29.2 PG — SIGNIFICANT CHANGE UP (ref 27–34)
MCHC RBC-ENTMCNC: 31.8 GM/DL — LOW (ref 32–36)
MCV RBC AUTO: 91.9 FL — SIGNIFICANT CHANGE UP (ref 80–100)
NRBC # BLD: 0 /100 WBCS — SIGNIFICANT CHANGE UP (ref 0–0)
PLATELET # BLD AUTO: 174 K/UL — SIGNIFICANT CHANGE UP (ref 150–400)
POTASSIUM SERPL-MCNC: 3.6 MMOL/L — SIGNIFICANT CHANGE UP (ref 3.5–5.3)
POTASSIUM SERPL-SCNC: 3.6 MMOL/L — SIGNIFICANT CHANGE UP (ref 3.5–5.3)
RBC # BLD: 3.59 M/UL — LOW (ref 4.2–5.8)
RBC # FLD: 14.2 % — SIGNIFICANT CHANGE UP (ref 10.3–14.5)
SODIUM SERPL-SCNC: 138 MMOL/L — SIGNIFICANT CHANGE UP (ref 135–145)
WBC # BLD: 12.89 K/UL — HIGH (ref 3.8–10.5)
WBC # FLD AUTO: 12.89 K/UL — HIGH (ref 3.8–10.5)

## 2021-01-04 PROCEDURE — 93284 PRGRMG EVAL IMPLANTABLE DFB: CPT | Mod: 26

## 2021-01-04 PROCEDURE — 93306 TTE W/DOPPLER COMPLETE: CPT | Mod: 26

## 2021-01-04 PROCEDURE — 93010 ELECTROCARDIOGRAM REPORT: CPT

## 2021-01-04 RX ORDER — POTASSIUM CHLORIDE 20 MEQ
40 PACKET (EA) ORAL ONCE
Refills: 0 | Status: COMPLETED | OUTPATIENT
Start: 2021-01-04 | End: 2021-01-04

## 2021-01-04 RX ADMIN — HEPARIN SODIUM 5000 UNIT(S): 5000 INJECTION INTRAVENOUS; SUBCUTANEOUS at 21:33

## 2021-01-04 RX ADMIN — SODIUM CHLORIDE 3 MILLILITER(S): 9 INJECTION INTRAMUSCULAR; INTRAVENOUS; SUBCUTANEOUS at 05:04

## 2021-01-04 RX ADMIN — OXYCODONE HYDROCHLORIDE 5 MILLIGRAM(S): 5 TABLET ORAL at 00:32

## 2021-01-04 RX ADMIN — Medication 650 MILLIGRAM(S): at 21:33

## 2021-01-04 RX ADMIN — SPIRONOLACTONE 25 MILLIGRAM(S): 25 TABLET, FILM COATED ORAL at 06:03

## 2021-01-04 RX ADMIN — Medication 40 MILLIGRAM(S): at 17:26

## 2021-01-04 RX ADMIN — Medication 650 MILLIGRAM(S): at 22:03

## 2021-01-04 RX ADMIN — PANTOPRAZOLE SODIUM 40 MILLIGRAM(S): 20 TABLET, DELAYED RELEASE ORAL at 06:03

## 2021-01-04 RX ADMIN — SODIUM CHLORIDE 3 MILLILITER(S): 9 INJECTION INTRAMUSCULAR; INTRAVENOUS; SUBCUTANEOUS at 21:42

## 2021-01-04 RX ADMIN — HEPARIN SODIUM 5000 UNIT(S): 5000 INJECTION INTRAVENOUS; SUBCUTANEOUS at 13:18

## 2021-01-04 RX ADMIN — ATORVASTATIN CALCIUM 40 MILLIGRAM(S): 80 TABLET, FILM COATED ORAL at 21:33

## 2021-01-04 RX ADMIN — Medication 650 MILLIGRAM(S): at 06:03

## 2021-01-04 RX ADMIN — Medication 81 MILLIGRAM(S): at 09:36

## 2021-01-04 RX ADMIN — OXYCODONE HYDROCHLORIDE 5 MILLIGRAM(S): 5 TABLET ORAL at 23:20

## 2021-01-04 RX ADMIN — Medication 1 TABLET(S): at 09:35

## 2021-01-04 RX ADMIN — Medication 25 MILLIGRAM(S): at 06:03

## 2021-01-04 RX ADMIN — SPIRONOLACTONE 25 MILLIGRAM(S): 25 TABLET, FILM COATED ORAL at 17:26

## 2021-01-04 RX ADMIN — Medication 40 MILLIGRAM(S): at 06:03

## 2021-01-04 RX ADMIN — Medication 40 MILLIEQUIVALENT(S): at 09:36

## 2021-01-04 RX ADMIN — Medication 650 MILLIGRAM(S): at 06:33

## 2021-01-04 RX ADMIN — HEPARIN SODIUM 5000 UNIT(S): 5000 INJECTION INTRAVENOUS; SUBCUTANEOUS at 06:03

## 2021-01-04 RX ADMIN — Medication 2 MILLIGRAM(S): at 06:04

## 2021-01-04 RX ADMIN — OXYCODONE HYDROCHLORIDE 5 MILLIGRAM(S): 5 TABLET ORAL at 00:02

## 2021-01-04 RX ADMIN — CLOPIDOGREL BISULFATE 75 MILLIGRAM(S): 75 TABLET, FILM COATED ORAL at 09:36

## 2021-01-04 RX ADMIN — SODIUM CHLORIDE 3 MILLILITER(S): 9 INJECTION INTRAMUSCULAR; INTRAVENOUS; SUBCUTANEOUS at 13:01

## 2021-01-04 NOTE — PROGRESS NOTE ADULT - PROVIDER SPECIALTY LIST ADULT
CTU
Critical Care
Electrophysiology
Electrophysiology
Critical Care
CT Surgery
CT Surgery

## 2021-01-04 NOTE — PROGRESS NOTE ADULT - ASSESSMENT
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 Echocardiogram showed a new decline in LVEF to 29% from 38%. Denies N/V/D, fevers, chills, cough, palpitations, chest pain, syncope, orthopnea, nocturnal paroxysmal dyspnea, edema, cyanosis, heart murmurs, varicosities, phlebitis, and claudication.     On12/29/21, pt. underwent CABG x 3 w/ LIMA/ Ascending Aortic aneurysm replacement- EF 35%  Post-op Course:  TVP -EP consulted - 12/30 ICD/PPM placed  Primacor gtt - d/c'd POD #3  diurese  1/3 Transferred to floor POD #5 - upon arrival to floor - pt requested to go to the bathroom to have a bowel movement- after a large bowel movement, pt became unresponsive X 15 seconds.  Pt can recall the event claiming "I spaced out for a minute, I'm fine."  pt. claims this happens to him sometimes. ? Vaso Vagal  Neuro exam negative - pt a& o x4 no change on Tele - /SR 88. VSS.  will continue to monitor closely.   1/4 VVS; TTE revealed severe LV dysfunction and trace pericardial effusion. EP called for AICD / PPM interrogation.  Per Dr. Carrasquillo patient to be observed x 24 hours. Continue with current medication regimen.

## 2021-01-04 NOTE — PROGRESS NOTE ADULT - PROBLEM SELECTOR PROBLEM 3
S/P ICD (internal cardiac defibrillator) procedure
S/P ICD (internal cardiac defibrillator) procedure

## 2021-01-04 NOTE — PROGRESS NOTE ADULT - PROBLEM SELECTOR PLAN 2
asa/bb/
Continue with ASA 81 mg PO Daily and Plavix 75 mg PO daily   Continue with Toprol 25 mg PO Daily.   Continue with Statin   Increase activity as tolerated.   Encourage Chest PT / Pulmonary toileting and Incentive spirometry every 1 hour x 10 while awake.   Continue with PUD and DVT prophylaxis.   Shower on POD #5.   D/C plan home PT once medically cleared   Plan of care discussed with attending

## 2021-01-04 NOTE — PROCEDURE NOTE - ADDITIONAL PROCEDURE DETAILS
Interrogation Indication: Hypotension, ectopy  1. Presenting Rhythm: AS BIV Paced 85bpm  2. Measured data WNL, Normal ICD function   3. Battery longevity is 8yrs remaining  4. Review of stored data revealed no episodes.   5. AT/AF [place] <1%      03708 Interrogation Indication: Hypotension, ectopy  1. Presenting Rhythm: AS BIV Paced 85bpm  2. Measured data WNL, Normal ICD function   3. Battery longevity is 8yrs remaining  4. Review of stored data revealed no episodes since last interrogation 12/31/20.  5. AT/AF [place] <1%      [id number]

## 2021-01-04 NOTE — PROGRESS NOTE ADULT - PROBLEM SELECTOR PLAN 1
Continue with ASA 81 mg PO Daily and Plavix 75 mg PO daily   Continue with Toprol 25 mg PO Daily.   Continue on Lasix 40 mg PO BID and Aldactone 25 mg PO Daily   Continue with Statin   Increase activity as tolerated.   Encourage Chest PT / Pulmonary toileting and Incentive spirometry every 1 hour x 10 while awake.   Continue with PUD and DVT prophylaxis.   Shower on POD #5.   D/C plan home PT once medically cleared   Plan of care discussed with attending
asa/statin/bb/plavix  oob  d/c planning home MOnday

## 2021-01-04 NOTE — PROGRESS NOTE ADULT - SUBJECTIVE AND OBJECTIVE BOX
VITAL SIGNS    Subjective: "I want to go home." Denies CP, palpitation, SOB, AMBROCIO, HA, dizziness, N/V/D, fever or chills.  No acute event noted overnight.     Telemetry:  SR /  / A-paced 's     Vital Signs Last 24 Hrs  T(C): 36.3 (21 @ 13:30), Max: 37 (21 @ 21:48)  T(F): 97.3 (21 @ 13:30), Max: 98.6 (21 @ 21:48)  HR: 88 (21 @ 14:45) (85 - 99)  BP: 146/71 (21 @ 14:45) (103/68 - 146/71)  RR: 18 (21 @ 13:30) (16 - 18)  SpO2: 97% (21 @ 13:30) (97% - 99%)            @ 07:  -   @ 07:00  --------------------------------------------------------  IN: 450 mL / OUT: 501 mL / NET: -51 mL     @ 07:  -   @ 15:21  --------------------------------------------------------  IN: 240 mL / OUT: 500 mL / NET: -260 mL    Daily     Daily Weight in k.2 (2021 07:06)      PHYSICAL EXAM    Neurology: alert and oriented x 3, nonfocal, no gross deficits    CV: (+) S1 and S2, No murmurs, rubs, gallops or clicks     Sternal Wound: MSI -->with opsite dressing --> CDI , sternum stable    Lungs: CTA B/L     Abdomen: soft, nontender, nondistended, positive bowel sounds, (+) Flatus; (+) BM     :  Voiding               Extremities:  B/L LE (+) edema; negative calf tenderness; (+) 2 DP palpable        acetaminophen Tablet .. 650 milliGRAM(s) Oral every 6 hours PRN  aspirin enteric coated 81 milliGRAM(s) Oral daily  atorvastatin 40 milliGRAM(s) Oral at bedtime  clopidogrel Tablet 75 milliGRAM(s) Oral daily  furosemide Tablet 40 milliGRAM(s) Oral every 12 hours  heparin Injectable 5000 Unit(s) SubCutaneous every 8 hours  loperamide 2 milliGRAM(s) Oral every 8 hours PRN  metoprolol succinate ER 25 milliGRAM(s) Oral daily  multivitamin 1 Tablet(s) Oral daily  oxyCODONE IR 5 milliGRAM(s) Oral every 6 hours PRN  pantoprazole Tablet 40 milliGRAM(s) Oral before breakfast  sodium chloride 0.9% lock flush 3 milliLiter(s) IV Push every 8 hours  spironolactone 25 milliGRAM(s) Oral two times a day    Physical Therapy Rec:   Home  [  ]   Home w/ PT  [ X ]  Rehab  [  ]    Discussed with Cardiothoracic Team at AM rounds.

## 2021-01-05 ENCOUNTER — TRANSCRIPTION ENCOUNTER (OUTPATIENT)
Age: 76
End: 2021-01-05

## 2021-01-05 VITALS
HEART RATE: 76 BPM | TEMPERATURE: 99 F | DIASTOLIC BLOOD PRESSURE: 66 MMHG | RESPIRATION RATE: 18 BRPM | SYSTOLIC BLOOD PRESSURE: 125 MMHG | OXYGEN SATURATION: 95 %

## 2021-01-05 LAB
ANION GAP SERPL CALC-SCNC: 11 MMOL/L — SIGNIFICANT CHANGE UP (ref 5–17)
BUN SERPL-MCNC: 21 MG/DL — SIGNIFICANT CHANGE UP (ref 7–23)
CALCIUM SERPL-MCNC: 8.6 MG/DL — SIGNIFICANT CHANGE UP (ref 8.4–10.5)
CHLORIDE SERPL-SCNC: 100 MMOL/L — SIGNIFICANT CHANGE UP (ref 96–108)
CO2 SERPL-SCNC: 23 MMOL/L — SIGNIFICANT CHANGE UP (ref 22–31)
CREAT SERPL-MCNC: 0.79 MG/DL — SIGNIFICANT CHANGE UP (ref 0.5–1.3)
GLUCOSE SERPL-MCNC: 102 MG/DL — HIGH (ref 70–99)
HCT VFR BLD CALC: 29.5 % — LOW (ref 39–50)
HGB BLD-MCNC: 9.5 G/DL — LOW (ref 13–17)
MCHC RBC-ENTMCNC: 29.4 PG — SIGNIFICANT CHANGE UP (ref 27–34)
MCHC RBC-ENTMCNC: 32.2 GM/DL — SIGNIFICANT CHANGE UP (ref 32–36)
MCV RBC AUTO: 91.3 FL — SIGNIFICANT CHANGE UP (ref 80–100)
NRBC # BLD: 0 /100 WBCS — SIGNIFICANT CHANGE UP (ref 0–0)
PLATELET # BLD AUTO: 179 K/UL — SIGNIFICANT CHANGE UP (ref 150–400)
POTASSIUM SERPL-MCNC: 3.7 MMOL/L — SIGNIFICANT CHANGE UP (ref 3.5–5.3)
POTASSIUM SERPL-SCNC: 3.7 MMOL/L — SIGNIFICANT CHANGE UP (ref 3.5–5.3)
RBC # BLD: 3.23 M/UL — LOW (ref 4.2–5.8)
RBC # FLD: 14.2 % — SIGNIFICANT CHANGE UP (ref 10.3–14.5)
SODIUM SERPL-SCNC: 134 MMOL/L — LOW (ref 135–145)
WBC # BLD: 9.06 K/UL — SIGNIFICANT CHANGE UP (ref 3.8–10.5)
WBC # FLD AUTO: 9.06 K/UL — SIGNIFICANT CHANGE UP (ref 3.8–10.5)

## 2021-01-05 PROCEDURE — 99024 POSTOP FOLLOW-UP VISIT: CPT

## 2021-01-05 RX ORDER — FUROSEMIDE 40 MG
1 TABLET ORAL
Qty: 30 | Refills: 0
Start: 2021-01-05 | End: 2021-02-03

## 2021-01-05 RX ORDER — ATORVASTATIN CALCIUM 80 MG/1
1 TABLET, FILM COATED ORAL
Qty: 0 | Refills: 0 | DISCHARGE

## 2021-01-05 RX ORDER — METOPROLOL TARTRATE 50 MG
1 TABLET ORAL
Qty: 30 | Refills: 0
Start: 2021-01-05 | End: 2021-02-03

## 2021-01-05 RX ORDER — POTASSIUM CHLORIDE 20 MEQ
0.5 PACKET (EA) ORAL
Qty: 15 | Refills: 0
Start: 2021-01-05 | End: 2021-02-03

## 2021-01-05 RX ORDER — CLOPIDOGREL BISULFATE 75 MG/1
1 TABLET, FILM COATED ORAL
Qty: 30 | Refills: 0
Start: 2021-01-05 | End: 2021-02-03

## 2021-01-05 RX ORDER — ASPIRIN/CALCIUM CARB/MAGNESIUM 324 MG
1 TABLET ORAL
Qty: 30 | Refills: 0
Start: 2021-01-05 | End: 2021-02-03

## 2021-01-05 RX ORDER — FUROSEMIDE 40 MG
1 TABLET ORAL
Qty: 0 | Refills: 0 | DISCHARGE

## 2021-01-05 RX ORDER — LISINOPRIL 2.5 MG/1
1 TABLET ORAL
Qty: 0 | Refills: 0 | DISCHARGE

## 2021-01-05 RX ORDER — POTASSIUM CHLORIDE 20 MEQ
20 PACKET (EA) ORAL ONCE
Refills: 0 | Status: COMPLETED | OUTPATIENT
Start: 2021-01-05 | End: 2021-01-05

## 2021-01-05 RX ORDER — METOPROLOL TARTRATE 50 MG
1 TABLET ORAL
Qty: 0 | Refills: 0 | DISCHARGE

## 2021-01-05 RX ORDER — PANTOPRAZOLE SODIUM 20 MG/1
1 TABLET, DELAYED RELEASE ORAL
Qty: 30 | Refills: 0
Start: 2021-01-05 | End: 2021-02-03

## 2021-01-05 RX ORDER — ACETAMINOPHEN 500 MG
2 TABLET ORAL
Qty: 240 | Refills: 0
Start: 2021-01-05 | End: 2021-02-03

## 2021-01-05 RX ORDER — OXYCODONE HYDROCHLORIDE 5 MG/1
1 TABLET ORAL
Qty: 20 | Refills: 0
Start: 2021-01-05 | End: 2021-01-09

## 2021-01-05 RX ORDER — POLYETHYLENE GLYCOL 3350 17 G/17G
17 POWDER, FOR SOLUTION ORAL
Qty: 119 | Refills: 0
Start: 2021-01-05 | End: 2021-01-11

## 2021-01-05 RX ORDER — ATORVASTATIN CALCIUM 80 MG/1
1 TABLET, FILM COATED ORAL
Qty: 30 | Refills: 0
Start: 2021-01-05 | End: 2021-02-03

## 2021-01-05 RX ORDER — SPIRONOLACTONE 25 MG/1
1 TABLET, FILM COATED ORAL
Qty: 30 | Refills: 0
Start: 2021-01-05 | End: 2021-02-03

## 2021-01-05 RX ORDER — CLOPIDOGREL BISULFATE 75 MG/1
1 TABLET, FILM COATED ORAL
Qty: 0 | Refills: 0 | DISCHARGE

## 2021-01-05 RX ADMIN — PANTOPRAZOLE SODIUM 40 MILLIGRAM(S): 20 TABLET, DELAYED RELEASE ORAL at 05:46

## 2021-01-05 RX ADMIN — SPIRONOLACTONE 25 MILLIGRAM(S): 25 TABLET, FILM COATED ORAL at 05:46

## 2021-01-05 RX ADMIN — HEPARIN SODIUM 5000 UNIT(S): 5000 INJECTION INTRAVENOUS; SUBCUTANEOUS at 05:46

## 2021-01-05 RX ADMIN — CLOPIDOGREL BISULFATE 75 MILLIGRAM(S): 75 TABLET, FILM COATED ORAL at 11:12

## 2021-01-05 RX ADMIN — Medication 81 MILLIGRAM(S): at 11:12

## 2021-01-05 RX ADMIN — Medication 25 MILLIGRAM(S): at 05:46

## 2021-01-05 RX ADMIN — Medication 1 TABLET(S): at 11:12

## 2021-01-05 RX ADMIN — Medication 40 MILLIGRAM(S): at 05:46

## 2021-01-05 RX ADMIN — Medication 20 MILLIEQUIVALENT(S): at 11:25

## 2021-01-05 RX ADMIN — SODIUM CHLORIDE 3 MILLILITER(S): 9 INJECTION INTRAMUSCULAR; INTRAVENOUS; SUBCUTANEOUS at 05:29

## 2021-01-05 NOTE — DISCHARGE NOTE PROVIDER - NSDCFUADDINST_GEN_ALL_CORE_FT
Follow Do's and Don'ts of cardiac surgery instruction packet for care and activity at home.   Continue to take your prescribed medications as directed.   Follow up with Dr. Carrasquillo at CTS office at [facility] on 1/25/21 at 1:45 PM, call [phone] to confirm appointment.  Follow up w/ your primary care doctor and your cardiologist in 1-2 weeks, call to schedule an appointment.

## 2021-01-05 NOTE — DISCHARGE NOTE PROVIDER - CARE PROVIDER_API CALL
Otoniel Carrasquillo  SURGERY  [address] [place] [address]  Phone: [phone]  Fax: [phone]  Follow Up Time:     Eliane Bhat  Cardiovascular Diseases  [address] [place] [address]  Phone: [phone]  Fax: [phone]  Follow Up Time:

## 2021-01-05 NOTE — DISCHARGE NOTE PROVIDER - CARE PROVIDERS DIRECT ADDRESSES
[email or web address]

## 2021-01-05 NOTE — DISCHARGE NOTE PROVIDER - NSDCPNSUBOBJ_GEN_ALL_CORE
Neurology: A&Ox3, nonfocal, no gross deficits  CV : RRR+S1S2  Sternal Wound: MSI open to air stable  Lungs: Respirations non-labored, B/L BS  Abdomen: Soft, NT/ND, +BSx4Q, +BM  : Voiding without difficulty  Extremities: B/L LE edema, negative calf tenderness, +PP , R SVG incision    stable    More than 30 mins spent on total encounter, patient counseling and discharge instructions.

## 2021-01-05 NOTE — DISCHARGE NOTE NURSING/CASE MANAGEMENT/SOCIAL WORK - PATIENT PORTAL LINK FT
You can access the FollowMyHealth Patient Portal offered by [facility] by registering at the following website: [email or web address]. By joining [employer]’s FollowMyHealth portal, you will also be able to view your health information using other applications (apps) compatible with our system.

## 2021-01-05 NOTE — DISCHARGE NOTE PROVIDER - NSDCFUADDAPPT_GEN_ALL_CORE_FT
Follow Do's and Don'ts of cardiac surgery instruction packet for care and activity at home.   Continue to take your prescribed medications as directed.   Follow up with Dr. Carrasquillo at CTS office at [facility] on 1/25/21 at 1:45 PM, call [phone] to confirm appointment.  Follow up w/ your primary care doctor and your cardiologist in 1-2 weeks, call to schedule an appointment.

## 2021-01-05 NOTE — DISCHARGE NOTE PROVIDER - HOSPITAL COURSE
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 Echocardiogram showed a new decline in LVEF to 29% from 38%. Denies N/V/D, fevers, chills, cough, palpitations, chest pain, syncope, orthopnea, nocturnal paroxysmal dyspnea, edema, cyanosis, heart murmurs, varicosities, phlebitis, and claudication.     On12/29/21, pt. underwent CABG x 3 w/ LIMA/ Ascending Aortic aneurysm replacement- EF 35%  Post-op Course:  TVP -EP consulted - 12/30 ICD/PPM placed  Primacor gtt - d/c'd POD #3  diurese  1/3 Transferred to floor POD #5 - upon arrival to floor - pt requested to go to the bathroom to have a bowel movement- after a large bowel movement, pt became unresponsive X 15 seconds.  Pt can recall the event claiming "I spaced out for a minute, I'm fine."  pt. claims this happens to him sometimes. ? Vaso Vagal  Neuro exam negative - pt a& o x4 no change on Tele - /SR 88. VSS.  will continue to monitor closely.   1/4 VVS; TTE revealed severe LV dysfunction and trace pericardial effusion. EP called for AICD / PPM interrogation.  Per Dr. Carrasquillo patient to be observed x 24 hours. Continue with current medication regimen.    1/5 HD stable, cleared for discharge to home as per Dr Carrasquillo

## 2021-01-05 NOTE — DISCHARGE NOTE PROVIDER - NSDCMRMEDTOKEN_GEN_ALL_CORE_FT
acetaminophen 325 mg oral tablet: 2 tab(s) orally every 6 hours, As needed, Mild Pain (1 - 3)  allopurinol 100 mg oral tablet: 1 tab(s) orally once a day  aspirin 81 mg oral delayed release tablet: 1 tab(s) orally once a day  atorvastatin 40 mg oral tablet: 1 tab(s) orally once a day (at bedtime)  clopidogrel 75 mg oral tablet: 1 tab(s) orally once a day  furosemide 40 mg oral tablet: 1 tab(s) orally once a day  metoprolol succinate 25 mg oral tablet, extended release: 1 tab(s) orally once a day  Multiple Vitamins oral tablet: 1 tab(s) orally once a day  oxyCODONE 5 mg oral tablet: 1 tab(s) orally every 6 hours, As needed, Moderate Pain (4 - 6) May take 2 tabs for severe pain (7-10) MDD:8  pantoprazole 40 mg oral delayed release tablet: 1 tab(s) orally once a day (before a meal)  potassium chloride 20 mEq oral tablet, extended release: 0.5 tab(s) orally once a day   spironolactone 25 mg oral tablet: 1 tab(s) orally once a day    acetaminophen 325 mg oral tablet: 2 tab(s) orally every 6 hours, As needed, Mild Pain (1 - 3)  allopurinol 100 mg oral tablet: 1 tab(s) orally once a day  aspirin 81 mg oral delayed release tablet: 1 tab(s) orally once a day  atorvastatin 40 mg oral tablet: 1 tab(s) orally once a day (at bedtime)  clopidogrel 75 mg oral tablet: 1 tab(s) orally once a day  furosemide 40 mg oral tablet: 1 tab(s) orally once a day  metoprolol succinate 25 mg oral tablet, extended release: 1 tab(s) orally once a day  MiraLax oral powder for reconstitution: 17 gram(s) orally once a day, As Needed for constipation   Multiple Vitamins oral tablet: 1 tab(s) orally once a day  oxyCODONE 5 mg oral tablet: 1 tab(s) orally every 6 hours, As needed, Moderate Pain (4 - 6) May take 2 tabs for severe pain (7-10) MDD:8  pantoprazole 40 mg oral delayed release tablet: 1 tab(s) orally once a day (before a meal)  potassium chloride 20 mEq oral tablet, extended release: 0.5 tab(s) orally once a day   spironolactone 25 mg oral tablet: 1 tab(s) orally once a day

## 2021-01-06 ENCOUNTER — NON-APPOINTMENT (OUTPATIENT)
Age: 76
End: 2021-01-06

## 2021-01-06 ENCOUNTER — TRANSCRIPTION ENCOUNTER (OUTPATIENT)
Age: 76
End: 2021-01-06

## 2021-01-08 ENCOUNTER — TRANSCRIPTION ENCOUNTER (OUTPATIENT)
Age: 76
End: 2021-01-08

## 2021-01-11 ENCOUNTER — APPOINTMENT (OUTPATIENT)
Dept: CARE COORDINATION | Facility: HOME HEALTH | Age: 76
End: 2021-01-11
Payer: MEDICARE

## 2021-01-11 VITALS — SYSTOLIC BLOOD PRESSURE: 130 MMHG | DIASTOLIC BLOOD PRESSURE: 70 MMHG

## 2021-01-11 PROCEDURE — 99024 POSTOP FOLLOW-UP VISIT: CPT

## 2021-01-11 RX ORDER — POTASSIUM CHLORIDE 20 MEQ
20 TABLET, EXT RELEASE, PARTICLES/CRYSTALS ORAL DAILY
Refills: 0 | Status: DISCONTINUED | COMMUNITY
End: 2021-01-11

## 2021-01-11 NOTE — HISTORY OF PRESENT ILLNESS
[Home] : at home, [unfilled] , at the time of the visit. [Spouse] : spouse [Verbal consent obtained from patient] : the patient, [unfilled] [FreeTextEntry1] : FOLLOW YOUR HEART HOME VISIT-[employer]\par Telephonic Visit made Jerod LAFLEUR ] . A  patient of Dr Karis Carrasquillo  S/P CABG /Aortic hemiarch repair  on 12/28. Discharged from [facility]\par \par \par Telephonic visit made to patient for post discharge transitional care management and post surgical follow up. \par Patient visualized, accompanied by wife. He appears well. In no distress\par \par NO clear complaints, states he is doing fairly well\par \par \par \par

## 2021-01-11 NOTE — PHYSICAL EXAM
[Sclera] : the sclera and conjunctiva were normal [PERRL With Normal Accommodation] : pupils were equal in size, round, and reactive to light [Neck Appearance] : the appearance of the neck was normal [] : no respiratory distress [Abdomen Tenderness] : non-tender [No CVA Tenderness] : no ~M costovertebral angle tenderness [Abnormal Walk] : normal gait [Skin Color & Pigmentation] : normal skin color and pigmentation [FreeTextEntry1] : MTI=approximated, rt LE harvest site intact . L ACW ICD site intact-no hematoma [Oriented To Time, Place, And Person] : oriented to person, place, and time

## 2021-01-11 NOTE — ASSESSMENT
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 ICM-Now S/P [place] [employer] ICD

## 2021-01-13 ENCOUNTER — APPOINTMENT (OUTPATIENT)
Dept: ELECTROPHYSIOLOGY | Facility: CLINIC | Age: 76
End: 2021-01-13
Payer: MEDICARE

## 2021-01-13 ENCOUNTER — OUTPATIENT (OUTPATIENT)
Dept: OUTPATIENT SERVICES | Facility: HOSPITAL | Age: 76
LOS: 1 days | End: 2021-01-13
Payer: MEDICARE

## 2021-01-13 ENCOUNTER — NON-APPOINTMENT (OUTPATIENT)
Age: 76
End: 2021-01-13

## 2021-01-13 ENCOUNTER — RESULT REVIEW (OUTPATIENT)
Age: 76
End: 2021-01-13

## 2021-01-13 VITALS
WEIGHT: 249.4 LBS | HEART RATE: 95 BPM | DIASTOLIC BLOOD PRESSURE: 71 MMHG | BODY MASS INDEX: 33.05 KG/M2 | OXYGEN SATURATION: 98 % | SYSTOLIC BLOOD PRESSURE: 105 MMHG | HEIGHT: 73 IN

## 2021-01-13 DIAGNOSIS — Z95.5 PRESENCE OF CORONARY ANGIOPLASTY IMPLANT AND GRAFT: Chronic | ICD-10-CM

## 2021-01-13 DIAGNOSIS — I25.5 ISCHEMIC CARDIOMYOPATHY: ICD-10-CM

## 2021-01-13 DIAGNOSIS — Z98.890 OTHER SPECIFIED POSTPROCEDURAL STATES: Chronic | ICD-10-CM

## 2021-01-13 DIAGNOSIS — K43.2 INCISIONAL HERNIA WITHOUT OBSTRUCTION OR GANGRENE: Chronic | ICD-10-CM

## 2021-01-13 DIAGNOSIS — Z90.49 ACQUIRED ABSENCE OF OTHER SPECIFIED PARTS OF DIGESTIVE TRACT: Chronic | ICD-10-CM

## 2021-01-13 PROCEDURE — 71046 X-RAY EXAM CHEST 2 VIEWS: CPT | Mod: 26

## 2021-01-13 PROCEDURE — 93284 PRGRMG EVAL IMPLANTABLE DFB: CPT

## 2021-01-13 PROCEDURE — 99024 POSTOP FOLLOW-UP VISIT: CPT

## 2021-01-13 PROCEDURE — 71046 X-RAY EXAM CHEST 2 VIEWS: CPT

## 2021-01-13 RX ORDER — METOPROLOL SUCCINATE 25 MG/1
25 TABLET, EXTENDED RELEASE ORAL
Refills: 0 | Status: DISCONTINUED | COMMUNITY
End: 2021-01-13

## 2021-01-13 RX ORDER — METOPROLOL SUCCINATE 50 MG/1
50 TABLET, EXTENDED RELEASE ORAL DAILY
Qty: 90 | Refills: 3 | Status: DISCONTINUED | COMMUNITY
Start: 2021-01-13 | End: 2021-01-13

## 2021-01-15 ENCOUNTER — TRANSCRIPTION ENCOUNTER (OUTPATIENT)
Age: 76
End: 2021-01-15

## 2021-01-19 ENCOUNTER — TRANSCRIPTION ENCOUNTER (OUTPATIENT)
Age: 76
End: 2021-01-19

## 2021-01-20 PROCEDURE — 33249 INSJ/RPLCMT DEFIB W/LEAD(S): CPT

## 2021-01-20 PROCEDURE — C1769: CPT

## 2021-01-20 PROCEDURE — 82962 GLUCOSE BLOOD TEST: CPT

## 2021-01-20 PROCEDURE — 80053 COMPREHEN METABOLIC PANEL: CPT

## 2021-01-20 PROCEDURE — 82330 ASSAY OF CALCIUM: CPT

## 2021-01-20 PROCEDURE — 82553 CREATINE MB FRACTION: CPT

## 2021-01-20 PROCEDURE — C1894: CPT

## 2021-01-20 PROCEDURE — 85396 CLOTTING ASSAY WHOLE BLOOD: CPT

## 2021-01-20 PROCEDURE — 93005 ELECTROCARDIOGRAM TRACING: CPT

## 2021-01-20 PROCEDURE — 86850 RBC ANTIBODY SCREEN: CPT

## 2021-01-20 PROCEDURE — 80048 BASIC METABOLIC PNL TOTAL CA: CPT

## 2021-01-20 PROCEDURE — 83735 ASSAY OF MAGNESIUM: CPT

## 2021-01-20 PROCEDURE — C1751: CPT

## 2021-01-20 PROCEDURE — C1730: CPT

## 2021-01-20 PROCEDURE — 97162 PT EVAL MOD COMPLEX 30 MIN: CPT

## 2021-01-20 PROCEDURE — 82803 BLOOD GASES ANY COMBINATION: CPT

## 2021-01-20 PROCEDURE — 85014 HEMATOCRIT: CPT

## 2021-01-20 PROCEDURE — 94002 VENT MGMT INPAT INIT DAY: CPT

## 2021-01-20 PROCEDURE — 84132 ASSAY OF SERUM POTASSIUM: CPT

## 2021-01-20 PROCEDURE — C1892: CPT

## 2021-01-20 PROCEDURE — 93321 DOPPLER ECHO F-UP/LMTD STD: CPT

## 2021-01-20 PROCEDURE — 84100 ASSAY OF PHOSPHORUS: CPT

## 2021-01-20 PROCEDURE — 82435 ASSAY OF BLOOD CHLORIDE: CPT

## 2021-01-20 PROCEDURE — 82565 ASSAY OF CREATININE: CPT

## 2021-01-20 PROCEDURE — 94003 VENT MGMT INPAT SUBQ DAY: CPT

## 2021-01-20 PROCEDURE — C1887: CPT

## 2021-01-20 PROCEDURE — 85027 COMPLETE CBC AUTOMATED: CPT

## 2021-01-20 PROCEDURE — 86901 BLOOD TYPING SEROLOGIC RH(D): CPT

## 2021-01-20 PROCEDURE — 84443 ASSAY THYROID STIM HORMONE: CPT

## 2021-01-20 PROCEDURE — P9045: CPT

## 2021-01-20 PROCEDURE — 93306 TTE W/DOPPLER COMPLETE: CPT

## 2021-01-20 PROCEDURE — C8929: CPT

## 2021-01-20 PROCEDURE — P9047: CPT

## 2021-01-20 PROCEDURE — 86900 BLOOD TYPING SEROLOGIC ABO: CPT

## 2021-01-20 PROCEDURE — 85730 THROMBOPLASTIN TIME PARTIAL: CPT

## 2021-01-20 PROCEDURE — 93308 TTE F-UP OR LMTD: CPT

## 2021-01-20 PROCEDURE — C1768: CPT

## 2021-01-20 PROCEDURE — 86022 PLATELET ANTIBODIES: CPT

## 2021-01-20 PROCEDURE — C9803: CPT

## 2021-01-20 PROCEDURE — C1895: CPT

## 2021-01-20 PROCEDURE — C1729: CPT

## 2021-01-20 PROCEDURE — 84484 ASSAY OF TROPONIN QUANT: CPT

## 2021-01-20 PROCEDURE — 84295 ASSAY OF SERUM SODIUM: CPT

## 2021-01-20 PROCEDURE — 83605 ASSAY OF LACTIC ACID: CPT

## 2021-01-20 PROCEDURE — 88304 TISSUE EXAM BY PATHOLOGIST: CPT

## 2021-01-20 PROCEDURE — C1898: CPT

## 2021-01-20 PROCEDURE — 85025 COMPLETE CBC W/AUTO DIFF WBC: CPT

## 2021-01-20 PROCEDURE — 82947 ASSAY GLUCOSE BLOOD QUANT: CPT

## 2021-01-20 PROCEDURE — 85384 FIBRINOGEN ACTIVITY: CPT

## 2021-01-20 PROCEDURE — 85610 PROTHROMBIN TIME: CPT

## 2021-01-20 PROCEDURE — 82550 ASSAY OF CK (CPK): CPT

## 2021-01-20 PROCEDURE — 86891 AUTOLOGOUS BLOOD OP SALVAGE: CPT

## 2021-01-20 PROCEDURE — 85018 HEMOGLOBIN: CPT

## 2021-01-20 PROCEDURE — C1900: CPT

## 2021-01-20 PROCEDURE — 86923 COMPATIBILITY TEST ELECTRIC: CPT

## 2021-01-20 PROCEDURE — 71045 X-RAY EXAM CHEST 1 VIEW: CPT

## 2021-01-20 PROCEDURE — 97116 GAIT TRAINING THERAPY: CPT

## 2021-01-20 PROCEDURE — U0003: CPT

## 2021-01-20 PROCEDURE — C1882: CPT

## 2021-01-20 PROCEDURE — C1889: CPT

## 2021-01-22 ENCOUNTER — TRANSCRIPTION ENCOUNTER (OUTPATIENT)
Age: 76
End: 2021-01-22

## 2021-01-25 ENCOUNTER — APPOINTMENT (OUTPATIENT)
Dept: CARDIOTHORACIC SURGERY | Facility: CLINIC | Age: 76
End: 2021-01-25
Payer: MEDICARE

## 2021-01-25 VITALS
SYSTOLIC BLOOD PRESSURE: 134 MMHG | BODY MASS INDEX: 32.47 KG/M2 | OXYGEN SATURATION: 97 % | DIASTOLIC BLOOD PRESSURE: 80 MMHG | RESPIRATION RATE: 16 BRPM | HEIGHT: 73 IN | HEART RATE: 103 BPM | WEIGHT: 245 LBS | TEMPERATURE: 98.2 F

## 2021-01-25 PROCEDURE — 99024 POSTOP FOLLOW-UP VISIT: CPT

## 2021-01-25 PROCEDURE — 93000 ELECTROCARDIOGRAM COMPLETE: CPT

## 2021-01-25 RX ORDER — POTASSIUM CHLORIDE 1500 MG/1
20 TABLET, FILM COATED, EXTENDED RELEASE ORAL DAILY
Refills: 0 | Status: COMPLETED | COMMUNITY
End: 2021-01-25

## 2021-01-25 RX ORDER — LORATADINE 10 MG
17 TABLET,DISINTEGRATING ORAL DAILY
Qty: 1 | Refills: 1 | Status: COMPLETED | COMMUNITY
End: 2021-01-25

## 2021-01-25 RX ORDER — PANTOPRAZOLE 40 MG/1
40 TABLET, DELAYED RELEASE ORAL DAILY
Qty: 14 | Refills: 0 | Status: COMPLETED | COMMUNITY
End: 2021-01-25

## 2021-01-29 ENCOUNTER — TRANSCRIPTION ENCOUNTER (OUTPATIENT)
Age: 76
End: 2021-01-29

## 2021-02-02 DIAGNOSIS — Z85.038 PERSONAL HISTORY OF OTHER MALIGNANT NEOPLASM OF LARGE INTESTINE: ICD-10-CM

## 2021-02-02 DIAGNOSIS — Z87.39 PERSONAL HISTORY OF OTHER DISEASES OF THE MUSCULOSKELETAL SYSTEM AND CONNECTIVE TISSUE: ICD-10-CM

## 2021-02-02 DIAGNOSIS — I31.3 PERICARDIAL EFFUSION (NONINFLAMMATORY): ICD-10-CM

## 2021-02-02 DIAGNOSIS — R94.30 ABNORMAL RESULT OF CARDIOVASCULAR FUNCTION STUDY, UNSPECIFIED: ICD-10-CM

## 2021-02-02 DIAGNOSIS — Z98.890 OTHER SPECIFIED POSTPROCEDURAL STATES: ICD-10-CM

## 2021-02-02 DIAGNOSIS — Z90.49 ACQUIRED ABSENCE OF OTHER SPECIFIED PARTS OF DIGESTIVE TRACT: ICD-10-CM

## 2021-02-02 DIAGNOSIS — Z92.3 PERSONAL HISTORY OF IRRADIATION: ICD-10-CM

## 2021-02-02 DIAGNOSIS — R55 SYNCOPE AND COLLAPSE: ICD-10-CM

## 2021-02-02 RX ORDER — ASPIRIN 81 MG
81 TABLET, DELAYED RELEASE (ENTERIC COATED) ORAL
Refills: 0 | Status: DISCONTINUED | COMMUNITY
End: 2021-02-02

## 2021-02-04 ENCOUNTER — NON-APPOINTMENT (OUTPATIENT)
Age: 76
End: 2021-02-04

## 2021-02-04 ENCOUNTER — APPOINTMENT (OUTPATIENT)
Dept: CARDIOLOGY | Facility: CLINIC | Age: 76
End: 2021-02-04
Payer: MEDICARE

## 2021-02-04 VITALS
TEMPERATURE: 96.3 F | SYSTOLIC BLOOD PRESSURE: 112 MMHG | DIASTOLIC BLOOD PRESSURE: 77 MMHG | HEIGHT: 73 IN | HEART RATE: 90 BPM | BODY MASS INDEX: 32.19 KG/M2 | OXYGEN SATURATION: 98 %

## 2021-02-04 VITALS — WEIGHT: 244 LBS | BODY MASS INDEX: 32.19 KG/M2

## 2021-02-04 DIAGNOSIS — I71.2 THORACIC AORTIC ANEURYSM, W/OUT RUPTURE: ICD-10-CM

## 2021-02-04 PROCEDURE — 99214 OFFICE O/P EST MOD 30 MIN: CPT

## 2021-02-04 PROCEDURE — 93000 ELECTROCARDIOGRAM COMPLETE: CPT

## 2021-02-05 ENCOUNTER — TRANSCRIPTION ENCOUNTER (OUTPATIENT)
Age: 76
End: 2021-02-05

## 2021-02-05 NOTE — HISTORY OF PRESENT ILLNESS
[FreeTextEntry1] : Doing okay. Denies chest pain, shortness of breath or palpitations. Recovering from CABG well.

## 2021-02-05 NOTE — PHYSICAL EXAM
[General Appearance - Well Developed] : well developed [Normal Appearance] : normal appearance [General Appearance - In No Acute Distress] : no acute distress [Normal Conjunctiva] : the conjunctiva exhibited no abnormalities [Eyelids - No Xanthelasma] : the eyelids demonstrated no xanthelasmas [Normal Oral Mucosa] : normal oral mucosa [No Oral Pallor] : no oral pallor [No Oral Cyanosis] : no oral cyanosis [Respiration, Rhythm And Depth] : normal respiratory rhythm and effort [Exaggerated Use Of Accessory Muscles For Inspiration] : no accessory muscle use [Auscultation Breath Sounds / Voice Sounds] : lungs were clear to auscultation bilaterally [Heart Rate And Rhythm] : heart rate and rhythm were normal [Heart Sounds] : normal S1 and S2 [Murmurs] : no murmurs present [Edema] : no peripheral edema present [Abdomen Soft] : soft [Abdomen Tenderness] : non-tender [Abnormal Walk] : normal gait [Cyanosis, Localized] : no localized cyanosis [Skin Color & Pigmentation] : normal skin color and pigmentation [] : no rash [No Venous Stasis] : no venous stasis [Oriented To Time, Place, And Person] : oriented to person, place, and time [FreeTextEntry1] : well-healing sternal scar

## 2021-02-05 NOTE — DISCUSSION/SUMMARY
[Coronary Artery Disease] : coronary artery disease [Chronic Systolic Heart Failure] : chronic systolic congestive heart failure [Compensated] : compensated [Hypertension] : hypertension [Stable] : stable [FreeTextEntry1] : \par Currently stable from a cardiovascular standpoint. Normotensive. Systolic heart failure secondary to likely ischemic cardiomyopathy. Currently euvolemic. Stable CAD (s/p CABG x 3 vessels). Patient with ascending aortic and sheeba-arch repair. No ischemic or CHF symptoms. Continue current medications. ECG completed today and reviewed (findings as noted above). Follow up in 1 month.

## 2021-03-08 ENCOUNTER — TRANSCRIPTION ENCOUNTER (OUTPATIENT)
Age: 76
End: 2021-03-08

## 2021-03-11 ENCOUNTER — TRANSCRIPTION ENCOUNTER (OUTPATIENT)
Age: 76
End: 2021-03-11

## 2021-03-11 DIAGNOSIS — B02.9 ZOSTER W/OUT COMPLICATIONS: ICD-10-CM

## 2021-03-24 ENCOUNTER — TRANSCRIPTION ENCOUNTER (OUTPATIENT)
Age: 76
End: 2021-03-24

## 2021-03-25 ENCOUNTER — APPOINTMENT (OUTPATIENT)
Dept: CARDIOLOGY | Facility: CLINIC | Age: 76
End: 2021-03-25

## 2021-03-31 ENCOUNTER — NON-APPOINTMENT (OUTPATIENT)
Age: 76
End: 2021-03-31

## 2021-03-31 ENCOUNTER — APPOINTMENT (OUTPATIENT)
Dept: ELECTROPHYSIOLOGY | Facility: CLINIC | Age: 76
End: 2021-03-31
Payer: MEDICARE

## 2021-03-31 PROCEDURE — 93295 DEV INTERROG REMOTE 1/2/MLT: CPT

## 2021-03-31 PROCEDURE — 93296 REM INTERROG EVL PM/IDS: CPT

## 2021-04-13 ENCOUNTER — NON-APPOINTMENT (OUTPATIENT)
Age: 76
End: 2021-04-13

## 2021-04-16 PROBLEM — Z98.890 S/P ASCENDING AORTIC ANEURYSM REPAIR: Status: ACTIVE | Noted: 2021-01-11

## 2021-04-16 PROBLEM — Z95.1 S/P CABG X 3: Status: ACTIVE | Noted: 2021-01-11

## 2021-04-16 NOTE — CONSULT LETTER
[FreeTextEntry2] : Ki Long M.D.\[facility], [place] [phone] [place] [address]\[place] [id number] [FreeTextEntry1] : I had the pleasure of seeing your patient, CHARLINE LAFLEUR, in my office today. \par \par We take a multidisciplinary team approach to patient care and consider you, the referring physician, an extension of our team. We will maintain an open line of communication with you throughout your patient's treatment course.  \par \par As you recall, he is a 76 year old male status post CABG X 3 ( LIMA to LAD, SVG to OM-1, RPL) , Ascending aortic and Jaison arch repair using 30 mm Gelweave vascular graft on 12/28/20 .The patient presents to the office today for a routine follow up visit with repeat diagnostic imaging . I have enclosed a copy for your records.\par \par TTE ______________________\par ________________________ Therefore, I have recommended that the patient to follow up with Cardiologist and PCP. \par \par I appreciate the opportunity to care for your patient at [facility] . If there are any questions or concerns, please call me at [phone]. \par \par Please see my note below. \par \par Sincerely, \par \par \par \par \par Otoniel Carrasquillo MD\par Director\par [facility]\par Professor \par Cardiovascular & Thoracic Surgery\par [facility]\[employer].\par \par \par [facility]:\par Department of Cardiovascular and Thoracic Surgery\[address], [address]\par Office: [phone]\par Fax: [phone]\par \par mAy Thorne\par  \par Department of Cardiovascular and Thoracic Surgery\[address], [address]\par Phone: [phone]\par Office: [phone]\par \par

## 2021-04-16 NOTE — HISTORY OF PRESENT ILLNESS
[FreeTextEntry1] : This is a 75 year old male with past medical history of CAD S/P Stent x 1 (10 yrs ago), Hypertension, Dyslipidemia , Ischemic cardiomyopathy, Rectal tumor S/P resection( Ileostomy followed by ileostomy reversal , Radiation) , known Ascending aortic aneurysm 1.5 yrs, Now status post CABG X 3 ( LIMA to LAD, SVG to OM-1, RPL) , Ascending aortic and Jaison arch repair using 30 mm Gelweave vascular graft on 12/28/20. Post op course significant with CHB, on 12/30/20 S/P ICD/PPM. Had an episode of unresponsiveness x 15 seconds. Neuro exam negative. Interrogated the PPM. He is here for 3 months follow up with Echocardiogram.

## 2021-04-21 ENCOUNTER — NON-APPOINTMENT (OUTPATIENT)
Age: 76
End: 2021-04-21

## 2021-04-23 ENCOUNTER — APPOINTMENT (OUTPATIENT)
Dept: CARDIOTHORACIC SURGERY | Facility: CLINIC | Age: 76
End: 2021-04-23

## 2021-04-23 ENCOUNTER — APPOINTMENT (OUTPATIENT)
Dept: ELECTROPHYSIOLOGY | Facility: CLINIC | Age: 76
End: 2021-04-23

## 2021-04-23 DIAGNOSIS — Z86.79 OTHER SPECIFIED POSTPROCEDURAL STATES: ICD-10-CM

## 2021-04-23 DIAGNOSIS — E78.5 HYPERLIPIDEMIA, UNSPECIFIED: ICD-10-CM

## 2021-04-23 DIAGNOSIS — Z98.890 OTHER SPECIFIED POSTPROCEDURAL STATES: ICD-10-CM

## 2021-04-23 DIAGNOSIS — Z95.1 PRESENCE OF AORTOCORONARY BYPASS GRAFT: ICD-10-CM

## 2021-05-06 ENCOUNTER — APPOINTMENT (OUTPATIENT)
Dept: CARDIOLOGY | Facility: CLINIC | Age: 76
End: 2021-05-06

## 2021-05-20 NOTE — H&P PST ADULT - NS PRO FEM  PAP SMEARS 3YRS
amantadine 100 mg oral tablet: 1 tab(s) orally 2 times a day  buPROPion 150 mg/24 hours (XL) oral tablet, extended release: 1 tab(s) orally every 24 hours  carbidopa-levodopa 25 mg-100 mg oral tablet: orally 4 times a day  carbidopa-levodopa 50 mg-200 mg oral tablet, extended release: orally once a day (at bedtime)  Crestor 10 mg oral tablet: 1 tab(s) orally once a day  Protonix 40 mg oral delayed release tablet: 1 tab(s) orally once a day   rasagiline 0.5 mg oral tablet: 1 tab(s) orally once a day  
not applicable (Male)

## 2021-05-21 ENCOUNTER — TRANSCRIPTION ENCOUNTER (OUTPATIENT)
Age: 76
End: 2021-05-21

## 2021-06-15 ENCOUNTER — TRANSCRIPTION ENCOUNTER (OUTPATIENT)
Age: 76
End: 2021-06-15

## 2021-06-30 ENCOUNTER — APPOINTMENT (OUTPATIENT)
Dept: ELECTROPHYSIOLOGY | Facility: CLINIC | Age: 76
End: 2021-06-30
Payer: MEDICARE

## 2021-06-30 ENCOUNTER — NON-APPOINTMENT (OUTPATIENT)
Age: 76
End: 2021-06-30

## 2021-06-30 PROCEDURE — 93296 REM INTERROG EVL PM/IDS: CPT

## 2021-06-30 PROCEDURE — 93295 DEV INTERROG REMOTE 1/2/MLT: CPT

## 2021-08-05 ENCOUNTER — TRANSCRIPTION ENCOUNTER (OUTPATIENT)
Age: 76
End: 2021-08-05

## 2021-08-30 ENCOUNTER — TRANSCRIPTION ENCOUNTER (OUTPATIENT)
Age: 76
End: 2021-08-30

## 2021-09-02 ENCOUNTER — NON-APPOINTMENT (OUTPATIENT)
Age: 76
End: 2021-09-02

## 2021-09-02 ENCOUNTER — APPOINTMENT (OUTPATIENT)
Dept: CARDIOLOGY | Facility: CLINIC | Age: 76
End: 2021-09-02
Payer: MEDICARE

## 2021-09-02 VITALS
DIASTOLIC BLOOD PRESSURE: 85 MMHG | WEIGHT: 244 LBS | HEART RATE: 77 BPM | SYSTOLIC BLOOD PRESSURE: 121 MMHG | BODY MASS INDEX: 32.34 KG/M2 | HEIGHT: 73 IN | OXYGEN SATURATION: 98 %

## 2021-09-02 DIAGNOSIS — Z01.818 ENCOUNTER FOR OTHER PREPROCEDURAL EXAMINATION: ICD-10-CM

## 2021-09-02 DIAGNOSIS — Z09 ENCOUNTER FOR FOLLOW-UP EXAMINATION AFTER COMPLETED TREATMENT FOR CONDITIONS OTHER THAN MALIGNANT NEOPLASM: ICD-10-CM

## 2021-09-02 DIAGNOSIS — I25.5 ISCHEMIC CARDIOMYOPATHY: ICD-10-CM

## 2021-09-02 PROCEDURE — 93000 ELECTROCARDIOGRAM COMPLETE: CPT

## 2021-09-02 PROCEDURE — 99214 OFFICE O/P EST MOD 30 MIN: CPT

## 2021-09-02 NOTE — PHYSICAL EXAM
[Well Developed] : well developed [Well Nourished] : well nourished [No Acute Distress] : no acute distress [Normal Conjunctiva] : normal conjunctiva [Normal Venous Pressure] : normal venous pressure [No Carotid Bruit] : no carotid bruit [Normal S1, S2] : normal S1, S2 [No Rub] : no rub [No Murmur] : no murmur [No Gallop] : no gallop [Clear Lung Fields] : clear lung fields [Good Air Entry] : good air entry [No Respiratory Distress] : no respiratory distress  [Soft] : abdomen soft [Non Tender] : non-tender [Normal Gait] : normal gait [No Edema] : no edema [No Cyanosis] : no cyanosis [No Rash] : no rash [No Skin Lesions] : no skin lesions [Moves all extremities] : moves all extremities [No Focal Deficits] : no focal deficits [Normal Speech] : normal speech [Alert and Oriented] : alert and oriented

## 2021-09-07 PROBLEM — Z01.818 PREOP TESTING: Status: RESOLVED | Noted: 2020-12-24 | Resolved: 2021-09-07

## 2021-09-07 PROBLEM — Z09 POSTOPERATIVE FOLLOW-UP: Status: RESOLVED | Noted: 2021-01-20 | Resolved: 2021-09-07

## 2021-09-07 NOTE — CARDIOLOGY SUMMARY
[de-identified] : \par 09/02/21 - ventricular pacing\par  [de-identified] : \par 12/20/11 (exercise thallium) - 9 METS, no chest pain, inferior, inferoapical and mid to distal inferolateral infarct, LVEF 39%\par  [de-identified] : \par 11/24/20 - MAC, mild MR, mild AR, normal LA, grossly severe global LV systolic dysfunction, moderate diastolic dysfunction, grossly normal RV systolic function, LVEF 29%\par  [de-identified] : \par 12/08/20 (DIAG) - oLM 20%, oLAD 40%, pLAD 40%, mLAD 70%, oS1 80%, mCx 60%, dCx severe atherosclerosis, mOM1 70%, pRCA 20%, mRCA 20%, pRPLS 80% ISR, PA 28/10 mmHg, PCW 6 mmHg\par 05/23/10 (PCI) - ENDEAVOR stent to prox RPLS\par  [de-identified] : \par 12/28/20 (CABG) - LIMA-LAD, sequential SVG-OM1-RPL, and ascending aortic and sheeba-arch repair by Otoniel Carrasquillo MD

## 2021-09-07 NOTE — DISCUSSION/SUMMARY
[Coronary Artery Disease] : coronary artery disease [Systolic Heart Failure] : systolic heart failure [Hypertension] : hypertension [Compensated] : compensated [Stable] : stable [FreeTextEntry1] : \par Currently stable from a cardiovascular standpoint. Normotensive. Chronic systolic heart failure secondary to ischemic cardiomyopathy (LVEF 29%). Currently appears euvolemic. Stable CAD (s/p CABG, prior RPLS stent). No ischemic or CHF symptoms. History of ascending aortic aneurysm repair. Recovering from shingles. Continue current medications. ECG completed today and reviewed (findings as noted above). Will obtain an echocardiogram to reassess his cardiac structures and function. Follow up in 3-4 months.

## 2021-09-07 NOTE — HISTORY OF PRESENT ILLNESS
[FreeTextEntry1] : Doing okay. Denies chest pain, shortness of breath or palpitations. Recovering from shingles (left mid abdomen).

## 2021-09-16 ENCOUNTER — APPOINTMENT (OUTPATIENT)
Dept: CV DIAGNOSITCS | Facility: HOSPITAL | Age: 76
End: 2021-09-16
Payer: MEDICARE

## 2021-09-16 ENCOUNTER — OUTPATIENT (OUTPATIENT)
Dept: OUTPATIENT SERVICES | Facility: HOSPITAL | Age: 76
LOS: 1 days | End: 2021-09-16

## 2021-09-16 DIAGNOSIS — Z98.890 OTHER SPECIFIED POSTPROCEDURAL STATES: Chronic | ICD-10-CM

## 2021-09-16 DIAGNOSIS — R94.30 ABNORMAL RESULT OF CARDIOVASCULAR FUNCTION STUDY, UNSPECIFIED: ICD-10-CM

## 2021-09-16 DIAGNOSIS — Z90.49 ACQUIRED ABSENCE OF OTHER SPECIFIED PARTS OF DIGESTIVE TRACT: Chronic | ICD-10-CM

## 2021-09-16 DIAGNOSIS — K43.2 INCISIONAL HERNIA WITHOUT OBSTRUCTION OR GANGRENE: Chronic | ICD-10-CM

## 2021-09-16 DIAGNOSIS — Z95.5 PRESENCE OF CORONARY ANGIOPLASTY IMPLANT AND GRAFT: Chronic | ICD-10-CM

## 2021-09-16 PROCEDURE — 93306 TTE W/DOPPLER COMPLETE: CPT | Mod: 26

## 2021-09-29 ENCOUNTER — APPOINTMENT (OUTPATIENT)
Dept: ELECTROPHYSIOLOGY | Facility: CLINIC | Age: 76
End: 2021-09-29
Payer: MEDICARE

## 2021-09-29 ENCOUNTER — NON-APPOINTMENT (OUTPATIENT)
Age: 76
End: 2021-09-29

## 2021-09-29 PROCEDURE — 93295 DEV INTERROG REMOTE 1/2/MLT: CPT

## 2021-09-29 PROCEDURE — 93296 REM INTERROG EVL PM/IDS: CPT

## 2021-11-20 NOTE — PRE-OP CHECKLIST - NS PREOP CHK MONITOR ANESTHESIA CONSENT
PAST MEDICAL HISTORY:  Bladder infection, chronic     Breast cancer right breast CA in 2005    Chronic sinusitis     CREST (calcinosis, Raynaud's phenomenon, esophageal dysfunction, sclerodactyly, telangiectasia) affecting lungs, bladder, kidney's, esophagus, skin, eyes and ears    Hypothyroid     Lymphedema right arm    Primary osteoarthritis of right knee     Scleroderma     Varicose veins     
done

## 2021-12-29 ENCOUNTER — APPOINTMENT (OUTPATIENT)
Dept: ELECTROPHYSIOLOGY | Facility: CLINIC | Age: 76
End: 2021-12-29
Payer: MEDICARE

## 2021-12-29 ENCOUNTER — NON-APPOINTMENT (OUTPATIENT)
Age: 76
End: 2021-12-29

## 2021-12-29 PROCEDURE — 93295 DEV INTERROG REMOTE 1/2/MLT: CPT | Mod: NC

## 2021-12-29 PROCEDURE — 93296 REM INTERROG EVL PM/IDS: CPT | Mod: NC

## 2021-12-31 ENCOUNTER — TRANSCRIPTION ENCOUNTER (OUTPATIENT)
Age: 76
End: 2021-12-31

## 2022-01-06 ENCOUNTER — TRANSCRIPTION ENCOUNTER (OUTPATIENT)
Age: 77
End: 2022-01-06

## 2022-03-28 ENCOUNTER — TRANSCRIPTION ENCOUNTER (OUTPATIENT)
Age: 77
End: 2022-03-28

## 2022-04-01 ENCOUNTER — NON-APPOINTMENT (OUTPATIENT)
Age: 77
End: 2022-04-01

## 2022-04-01 ENCOUNTER — APPOINTMENT (OUTPATIENT)
Dept: ELECTROPHYSIOLOGY | Facility: CLINIC | Age: 77
End: 2022-04-01
Payer: SELF-PAY

## 2022-04-01 PROCEDURE — 93295 DEV INTERROG REMOTE 1/2/MLT: CPT

## 2022-04-01 PROCEDURE — 93296 REM INTERROG EVL PM/IDS: CPT

## 2022-04-05 ENCOUNTER — TRANSCRIPTION ENCOUNTER (OUTPATIENT)
Age: 77
End: 2022-04-05

## 2022-04-06 ENCOUNTER — TRANSCRIPTION ENCOUNTER (OUTPATIENT)
Age: 77
End: 2022-04-06

## 2022-04-14 ENCOUNTER — APPOINTMENT (OUTPATIENT)
Dept: CARDIOLOGY | Facility: CLINIC | Age: 77
End: 2022-04-14
Payer: MEDICARE

## 2022-04-14 ENCOUNTER — NON-APPOINTMENT (OUTPATIENT)
Age: 77
End: 2022-04-14

## 2022-04-14 VITALS
OXYGEN SATURATION: 97 % | BODY MASS INDEX: 31.81 KG/M2 | SYSTOLIC BLOOD PRESSURE: 141 MMHG | HEIGHT: 73 IN | DIASTOLIC BLOOD PRESSURE: 86 MMHG | HEART RATE: 81 BPM | WEIGHT: 240 LBS

## 2022-04-14 VITALS — SYSTOLIC BLOOD PRESSURE: 123 MMHG | DIASTOLIC BLOOD PRESSURE: 78 MMHG

## 2022-04-14 PROCEDURE — 93000 ELECTROCARDIOGRAM COMPLETE: CPT

## 2022-04-14 PROCEDURE — 99214 OFFICE O/P EST MOD 30 MIN: CPT

## 2022-04-18 ENCOUNTER — TRANSCRIPTION ENCOUNTER (OUTPATIENT)
Age: 77
End: 2022-04-18

## 2022-04-18 NOTE — DISCUSSION/SUMMARY
[Coronary Artery Disease] : coronary artery disease [Systolic Heart Failure] : systolic heart failure [Compensated] : compensated [Hypertension] : hypertension [Stable] : stable [FreeTextEntry1] : \par Currently stable from a cardiovascular standpoint. Normotensive. Chronic systolic heart failure secondary to ischemic cardiomyopathy (LVEF 29%). Currently appears euvolemic. Stable CAD (s/p CABG, prior RPLS stent). No ischemic or CHF symptoms. History of ascending aortic aneurysm repair. At this time, patient is considered ACC/AHA CHF stage B. Continue current medications. ECG completed today and reviewed (findings as noted above). Will obtain an echocardiogram to reassess his cardiac structures and function (last echo in 2020). Follow up in 4 months.

## 2022-04-18 NOTE — HISTORY OF PRESENT ILLNESS
[FreeTextEntry1] : Doing okay. Denies chest pain, shortness of breath or palpitations. Has symptoms from his shingles which is persistent.

## 2022-04-18 NOTE — CARDIOLOGY SUMMARY
[de-identified] : \par 04/14/22 - ventricular pacing\par  [de-identified] : \par 12/20/11 (exercise thallium) - 9 METS, no chest pain, inferior, inferoapical and mid to distal inferolateral infarct, LVEF 39%\par  [de-identified] : \par 11/24/20 - MAC, mild MR, mild AR, normal LA, grossly severe global LV systolic dysfunction, moderate diastolic dysfunction, grossly normal RV systolic function, LVEF 29%\par  [de-identified] : \par 12/08/20 (CATH) - oLM 20%, oLAD 40%, pLAD 40%, mLAD 70%, oS1 80%, mCx 60%, dCx severe atherosclerosis, mOM1 70%, pRCA 20%, mRCA 20%, pRPLS 80% ISR, PA 28/10 mmHg, PCW 6 mmHg\par 05/23/10 (PCI) - ENDEAVOR stent to prox RPLS\par  [de-identified] : \par 12/28/20 (CABG) - LIMA-LAD, sequential SVG-OM1-RPL, and ascending aortic and sheeba-arch repair by Otoniel Carrasquillo MD

## 2022-04-18 NOTE — REVIEW OF SYSTEMS
[Tingling (Paresthesia)] : tingling [Negative] : Musculoskeletal [de-identified] : see HPI [de-identified] : see HPI

## 2022-04-20 ENCOUNTER — TRANSCRIPTION ENCOUNTER (OUTPATIENT)
Age: 77
End: 2022-04-20

## 2022-04-21 ENCOUNTER — TRANSCRIPTION ENCOUNTER (OUTPATIENT)
Age: 77
End: 2022-04-21

## 2022-04-27 ENCOUNTER — APPOINTMENT (OUTPATIENT)
Dept: CV DIAGNOSITCS | Facility: HOSPITAL | Age: 77
End: 2022-04-27
Payer: MEDICARE

## 2022-04-27 ENCOUNTER — OUTPATIENT (OUTPATIENT)
Dept: OUTPATIENT SERVICES | Facility: HOSPITAL | Age: 77
LOS: 1 days | End: 2022-04-27

## 2022-04-27 DIAGNOSIS — Z95.5 PRESENCE OF CORONARY ANGIOPLASTY IMPLANT AND GRAFT: Chronic | ICD-10-CM

## 2022-04-27 DIAGNOSIS — I50.22 CHRONIC SYSTOLIC (CONGESTIVE) HEART FAILURE: ICD-10-CM

## 2022-04-27 DIAGNOSIS — Z98.890 OTHER SPECIFIED POSTPROCEDURAL STATES: Chronic | ICD-10-CM

## 2022-04-27 DIAGNOSIS — K43.2 INCISIONAL HERNIA WITHOUT OBSTRUCTION OR GANGRENE: Chronic | ICD-10-CM

## 2022-04-27 DIAGNOSIS — Z90.49 ACQUIRED ABSENCE OF OTHER SPECIFIED PARTS OF DIGESTIVE TRACT: Chronic | ICD-10-CM

## 2022-04-27 PROCEDURE — 93306 TTE W/DOPPLER COMPLETE: CPT | Mod: 26

## 2022-04-27 NOTE — ED ADULT NURSE NOTE - PSH
S/P colon resection
Duration Of Freeze Thaw-Cycle (Seconds): 5
Detail Level: Simple
Number Of Freeze-Thaw Cycles: 1 freeze-thaw cycle
Show Aperture Variable?: Yes
Post-Care Instructions: I reviewed with the patient in detail post-care instructions. Patient is to wear sunprotection, and avoid picking at any of the treated lesions. Pt may apply Vaseline to crusted or scabbing areas.
Render Note In Bullet Format When Appropriate: No
Consent: The patient's consent was obtained including but not limited to risks of crusting, scabbing, blistering, scarring, darker or lighter pigmentary change, recurrence, incomplete removal and infection.

## 2022-05-06 RX ORDER — ALLOPURINOL 100 MG/1
100 TABLET ORAL DAILY
Qty: 90 | Refills: 3 | Status: ACTIVE | COMMUNITY

## 2022-05-06 RX ORDER — POTASSIUM CHLORIDE 1500 MG/1
20 TABLET, FILM COATED, EXTENDED RELEASE ORAL DAILY
Refills: 0 | Status: DISCONTINUED | COMMUNITY
End: 2022-05-06

## 2022-05-06 RX ORDER — OXYCODONE 5 MG/1
5 TABLET ORAL EVERY 6 HOURS
Qty: 20 | Refills: 0 | Status: DISCONTINUED | COMMUNITY
Start: 2021-01-05 | End: 2022-05-06

## 2022-05-06 RX ORDER — ACETAMINOPHEN 325 MG/1
TABLET, FILM COATED ORAL EVERY 6 HOURS
Refills: 0 | Status: DISCONTINUED | COMMUNITY
End: 2022-05-06

## 2022-07-01 ENCOUNTER — APPOINTMENT (OUTPATIENT)
Dept: ELECTROPHYSIOLOGY | Facility: CLINIC | Age: 77
End: 2022-07-01

## 2022-07-01 ENCOUNTER — NON-APPOINTMENT (OUTPATIENT)
Age: 77
End: 2022-07-01

## 2022-07-01 PROCEDURE — 93295 DEV INTERROG REMOTE 1/2/MLT: CPT

## 2022-07-01 PROCEDURE — 93296 REM INTERROG EVL PM/IDS: CPT

## 2022-10-05 ENCOUNTER — APPOINTMENT (OUTPATIENT)
Dept: ELECTROPHYSIOLOGY | Facility: CLINIC | Age: 77
End: 2022-10-05

## 2022-10-05 ENCOUNTER — NON-APPOINTMENT (OUTPATIENT)
Age: 77
End: 2022-10-05

## 2022-10-05 PROCEDURE — 93295 DEV INTERROG REMOTE 1/2/MLT: CPT

## 2022-10-05 PROCEDURE — 93296 REM INTERROG EVL PM/IDS: CPT

## 2022-10-20 ENCOUNTER — NON-APPOINTMENT (OUTPATIENT)
Age: 77
End: 2022-10-20

## 2022-10-20 ENCOUNTER — APPOINTMENT (OUTPATIENT)
Dept: CARDIOLOGY | Facility: CLINIC | Age: 77
End: 2022-10-20

## 2022-10-20 VITALS
HEIGHT: 73 IN | BODY MASS INDEX: 32.2 KG/M2 | DIASTOLIC BLOOD PRESSURE: 77 MMHG | WEIGHT: 243 LBS | HEART RATE: 75 BPM | SYSTOLIC BLOOD PRESSURE: 128 MMHG | OXYGEN SATURATION: 98 %

## 2022-10-20 PROCEDURE — 93000 ELECTROCARDIOGRAM COMPLETE: CPT

## 2022-10-20 PROCEDURE — 99214 OFFICE O/P EST MOD 30 MIN: CPT

## 2022-10-20 RX ORDER — GABAPENTIN 300 MG/1
300 CAPSULE ORAL 3 TIMES DAILY
Refills: 0 | Status: ACTIVE | COMMUNITY
Start: 2022-03-11

## 2022-10-25 NOTE — CARDIOLOGY SUMMARY
[de-identified] : \par 10/20/22 - ventricular pacing\par  [de-identified] : \par 12/20/11 (exercise thallium) - 9 METS, no chest pain, inferior, inferoapical and mid to distal inferolateral infarct, LVEF 39%\par  [de-identified] : \par 04/27/22 - MAC, mild MR, mild Ao root dilatation (4.4cm at sinuses of Valsalva), calcified AV with normal opening, mild AR, normal LA, grossly severe global LV systolic dysfunction, mild LVE, grossly normal RV systolic function, LVEF 25-30%\par 11/24/20 - MAC, mild MR, mild AR, normal LA, grossly severe global LV systolic dysfunction, moderate diastolic dysfunction, grossly normal RV systolic function, LVEF 29%\par  [de-identified] : \par 12/30/20 (ICD) - [employer] BiV ICD implant by Eliane Bhat MD\par  [de-identified] : \par 12/08/20 (CATH) - oLM 20%, oLAD 40%, pLAD 40%, mLAD 70%, oS1 80%, mCx 60%, dCx severe atherosclerosis, mOM1 70%, pRCA 20%, mRCA 20%, pRPLS 80% ISR, PA 28/10 mmHg, PCW 6 mmHg\par 05/23/10 (PCI) - ENDEAVOR stent to prox RPLS\par  [de-identified] : \par 12/28/20 (CABG) - LIMA-LAD, sequential SVG-OM1-RPL, and ascending aortic and sheeba-arch repair by Otoniel Carrasquillo MD

## 2022-10-25 NOTE — DISCUSSION/SUMMARY
[Coronary Artery Disease] : coronary artery disease [Systolic Heart Failure] : systolic heart failure [Compensated] : compensated [Hypertension] : hypertension [Stable] : stable [EKG obtained to assist in diagnosis and management of assessed problem(s)] : EKG obtained to assist in diagnosis and management of assessed problem(s) [FreeTextEntry1] : \par Currently stable from a cardiovascular standpoint. Normotensive. Chronic systolic heart failure secondary to ischemic cardiomyopathy (LVEF 25-30%). Currently appears euvolemic. Stable CAD (s/p CABG, prior RPLS stent). No ischemic or CHF symptoms. History of ascending aortic aneurysm repair. At this time, patient is considered ACC/AHA CHF stage B. Patient with BiV ICD. Continue current medications. ECG completed today and reviewed (findings as noted above). Follow up in 4 months.

## 2022-10-25 NOTE — REVIEW OF SYSTEMS
[Tingling (Paresthesia)] : tingling [Negative] : Musculoskeletal [de-identified] : see HPI [de-identified] : see HPI

## 2022-10-25 NOTE — HISTORY OF PRESENT ILLNESS
[FreeTextEntry1] : Currently doing well. Denies chest pain, shortness of breath or palpitations. Swims 3 days a week. Does about 20 laps without issues.

## 2022-12-02 ENCOUNTER — TRANSCRIPTION ENCOUNTER (OUTPATIENT)
Age: 77
End: 2022-12-02

## 2022-12-05 ENCOUNTER — TRANSCRIPTION ENCOUNTER (OUTPATIENT)
Age: 77
End: 2022-12-05

## 2023-01-04 ENCOUNTER — NON-APPOINTMENT (OUTPATIENT)
Age: 78
End: 2023-01-04

## 2023-01-04 ENCOUNTER — APPOINTMENT (OUTPATIENT)
Dept: ELECTROPHYSIOLOGY | Facility: CLINIC | Age: 78
End: 2023-01-04
Payer: MEDICARE

## 2023-01-04 PROCEDURE — 93296 REM INTERROG EVL PM/IDS: CPT

## 2023-01-04 PROCEDURE — 93295 DEV INTERROG REMOTE 1/2/MLT: CPT

## 2023-01-10 ENCOUNTER — TRANSCRIPTION ENCOUNTER (OUTPATIENT)
Age: 78
End: 2023-01-10

## 2023-01-25 ENCOUNTER — TRANSCRIPTION ENCOUNTER (OUTPATIENT)
Age: 78
End: 2023-01-25

## 2023-01-30 ENCOUNTER — TRANSCRIPTION ENCOUNTER (OUTPATIENT)
Age: 78
End: 2023-01-30

## 2023-03-13 ENCOUNTER — TRANSCRIPTION ENCOUNTER (OUTPATIENT)
Age: 78
End: 2023-03-13

## 2023-04-05 ENCOUNTER — APPOINTMENT (OUTPATIENT)
Dept: ELECTROPHYSIOLOGY | Facility: CLINIC | Age: 78
End: 2023-04-05
Payer: MEDICARE

## 2023-04-05 ENCOUNTER — NON-APPOINTMENT (OUTPATIENT)
Age: 78
End: 2023-04-05

## 2023-04-05 PROCEDURE — 93296 REM INTERROG EVL PM/IDS: CPT

## 2023-04-05 PROCEDURE — 93295 DEV INTERROG REMOTE 1/2/MLT: CPT

## 2023-04-20 ENCOUNTER — APPOINTMENT (OUTPATIENT)
Dept: CARDIOLOGY | Facility: CLINIC | Age: 78
End: 2023-04-20

## 2023-05-03 ENCOUNTER — TRANSCRIPTION ENCOUNTER (OUTPATIENT)
Age: 78
End: 2023-05-03

## 2023-05-25 ENCOUNTER — NON-APPOINTMENT (OUTPATIENT)
Age: 78
End: 2023-05-25

## 2023-05-25 ENCOUNTER — APPOINTMENT (OUTPATIENT)
Dept: CARDIOLOGY | Facility: CLINIC | Age: 78
End: 2023-05-25
Payer: MEDICARE

## 2023-05-25 VITALS
HEART RATE: 84 BPM | HEIGHT: 73 IN | SYSTOLIC BLOOD PRESSURE: 120 MMHG | BODY MASS INDEX: 31.81 KG/M2 | WEIGHT: 240 LBS | DIASTOLIC BLOOD PRESSURE: 70 MMHG | OXYGEN SATURATION: 98 %

## 2023-05-25 DIAGNOSIS — I25.10 ATHEROSCLEROTIC HEART DISEASE OF NATIVE CORONARY ARTERY W/OUT ANGINA PECTORIS: ICD-10-CM

## 2023-05-25 DIAGNOSIS — I10 ESSENTIAL (PRIMARY) HYPERTENSION: ICD-10-CM

## 2023-05-25 DIAGNOSIS — I50.22 CHRONIC SYSTOLIC (CONGESTIVE) HEART FAILURE: ICD-10-CM

## 2023-05-25 PROCEDURE — 93000 ELECTROCARDIOGRAM COMPLETE: CPT

## 2023-05-25 PROCEDURE — 99214 OFFICE O/P EST MOD 30 MIN: CPT

## 2023-05-25 NOTE — CARDIOLOGY SUMMARY
[de-identified] : \par 05/25/23 - ventricular pacing\par  [de-identified] : \par 12/20/11 (exercise thallium) - 9 METS, no chest pain, inferior, inferoapical and mid to distal inferolateral infarct, LVEF 39%\par  [de-identified] : \par 04/27/22 - MAC, mild MR, mild Ao root dilatation (4.4cm at sinuses of Valsalva), calcified AV with normal opening, mild AR, normal LA, grossly severe global LV systolic dysfunction, mild LVE, grossly normal RV systolic function, LVEF 25-30%\par 11/24/20 - MAC, mild MR, mild AR, normal LA, grossly severe global LV systolic dysfunction, moderate diastolic dysfunction, grossly normal RV systolic function, LVEF 29%\par  [de-identified] : \par 12/30/20 (ICD) - [employer] BiV ICD implant by Eliane Bhat MD\par  [de-identified] : \par 12/08/20 (CATH) - oLM 20%, oLAD 40%, pLAD 40%, mLAD 70%, oS1 80%, mCx 60%, dCx severe atherosclerosis, mOM1 70%, pRCA 20%, mRCA 20%, pRPLS 80% ISR, PA 28/10 mmHg, PCW 6 mmHg\par 05/23/10 (PCI) - ENDEAVOR stent to prox RPLS\par  [de-identified] : \par 12/28/20 (CABG) - LIMA-LAD, sequential SVG-OM1-RPL, and ascending aortic and sheeba-arch repair by Otoniel Carrasquillo MD

## 2023-05-25 NOTE — DISCUSSION/SUMMARY
[Coronary Artery Disease] : coronary artery disease [Systolic Heart Failure] : systolic heart failure [Compensated] : compensated [Hypertension] : hypertension [Stable] : stable [FreeTextEntry1] : \par Currently stable from a cardiovascular standpoint. Normotensive. Chronic systolic heart failure secondary to ischemic cardiomyopathy (LVEF 25-30%). Currently appears euvolemic. Stable CAD (s/p CABG, prior RPLS stent). No ischemic or CHF symptoms. History of ascending aortic aneurysm repair. At this time, patient is considered ACC/AHA CHF stage B. Patient with BiV ICD. Continue current medications including aspirin, clopidogrel and atorvastatin. ECG completed today and reviewed (findings as noted above). Will obtain an echo to reassess his cardiac structures and function. Follow up in 4-5 months. [EKG obtained to assist in diagnosis and management of assessed problem(s)] : EKG obtained to assist in diagnosis and management of assessed problem(s)

## 2023-05-25 NOTE — REVIEW OF SYSTEMS
[Tingling (Paresthesia)] : tingling [Negative] : Musculoskeletal [de-identified] : see HPI [de-identified] : see HPI

## 2023-05-25 NOTE — HISTORY OF PRESENT ILLNESS
[FreeTextEntry1] : Currently doing okay. Denies chest pain, shortness of breath or palpitations. Swims regularly without issues. Has some issues still stemming from his prior issues with shingles.

## 2023-06-07 ENCOUNTER — TRANSCRIPTION ENCOUNTER (OUTPATIENT)
Age: 78
End: 2023-06-07

## 2023-06-30 RX ORDER — METOPROLOL SUCCINATE 25 MG/1
25 TABLET, EXTENDED RELEASE ORAL DAILY
Qty: 90 | Refills: 3 | Status: ACTIVE | COMMUNITY
Start: 2021-01-13 | End: 1900-01-01

## 2023-07-05 ENCOUNTER — APPOINTMENT (OUTPATIENT)
Dept: CV DIAGNOSITCS | Facility: HOSPITAL | Age: 78
End: 2023-07-05

## 2023-07-05 ENCOUNTER — APPOINTMENT (OUTPATIENT)
Dept: ELECTROPHYSIOLOGY | Facility: CLINIC | Age: 78
End: 2023-07-05
Payer: MEDICARE

## 2023-07-05 ENCOUNTER — OUTPATIENT (OUTPATIENT)
Dept: OUTPATIENT SERVICES | Facility: HOSPITAL | Age: 78
LOS: 1 days | End: 2023-07-05
Payer: MEDICARE

## 2023-07-05 ENCOUNTER — NON-APPOINTMENT (OUTPATIENT)
Age: 78
End: 2023-07-05

## 2023-07-05 DIAGNOSIS — K43.2 INCISIONAL HERNIA WITHOUT OBSTRUCTION OR GANGRENE: Chronic | ICD-10-CM

## 2023-07-05 DIAGNOSIS — Z95.5 PRESENCE OF CORONARY ANGIOPLASTY IMPLANT AND GRAFT: Chronic | ICD-10-CM

## 2023-07-05 DIAGNOSIS — Z90.49 ACQUIRED ABSENCE OF OTHER SPECIFIED PARTS OF DIGESTIVE TRACT: Chronic | ICD-10-CM

## 2023-07-05 DIAGNOSIS — Z98.890 OTHER SPECIFIED POSTPROCEDURAL STATES: Chronic | ICD-10-CM

## 2023-07-05 DIAGNOSIS — I50.22 CHRONIC SYSTOLIC (CONGESTIVE) HEART FAILURE: ICD-10-CM

## 2023-07-05 PROCEDURE — 93306 TTE W/DOPPLER COMPLETE: CPT | Mod: 26

## 2023-07-05 PROCEDURE — 93296 REM INTERROG EVL PM/IDS: CPT

## 2023-07-05 PROCEDURE — 93295 DEV INTERROG REMOTE 1/2/MLT: CPT

## 2023-08-13 NOTE — H&P CARDIOLOGY - RESPIRATORY AND THORAX
Stable vital signs. Right leg with brace . PT/Ot to see her. Plan: home, per family they want a home health care. Will endorse to morning RN. Will continue to monitor. Problem: Patient Centered Care  Goal: Patient preferences are identified and integrated in the patient's plan of care  Description: Interventions:  - What would you like us to know as we care for you? From home with daughter.  - Provide timely, complete, and accurate information to patient/family  - Incorporate patient and family knowledge, values, beliefs, and cultural backgrounds into the planning and delivery of care  - Encourage patient/family to participate in care and decision-making at the level they choose  - Honor patient and family perspectives and choices  Outcome: Progressing     Problem: SAFETY ADULT - FALL  Goal: Free from fall injury  Description: INTERVENTIONS:  - Assess pt frequently for physical needs  - Identify cognitive and physical deficits and behaviors that affect risk of falls. - [place] fall precautions as indicated by assessment.  - Educate pt/family on patient safety including physical limitations  - Instruct pt to call for assistance with activity based on assessment  - Modify environment to reduce risk of injury  - Provide assistive devices as appropriate  - Consider OT/PT consult to assist with strengthening/mobility  - Encourage toileting schedule  Outcome: Progressing     Problem: CARDIOVASCULAR - ADULT  Goal: Maintains optimal cardiac output and hemodynamic stability  Description: INTERVENTIONS:  - Monitor vital signs, rhythm, and trends  - Monitor for bleeding, hypotension and signs of decreased cardiac output  - Evaluate effectiveness of vasoactive medications to optimize hemodynamic stability  - Monitor arterial and/or venous puncture sites for bleeding and/or hematoma  - Assess quality of pulses, skin color and temperature  - Assess for signs of decreased coronary artery perfusion - ex.  Angina  - Evaluate fluid balance, assess for edema, trend weights  Outcome: Progressing  Goal: Absence of cardiac arrhythmias or at baseline  Description: INTERVENTIONS:  - Continuous cardiac monitoring, monitor vital signs, obtain 12 lead EKG if indicated  - Evaluate effectiveness of antiarrhythmic and heart rate control medications as ordered  - Initiate emergency measures for life threatening arrhythmias  - Monitor electrolytes and administer replacement therapy as ordered  Outcome: Progressing     Problem: PAIN - ADULT  Goal: Verbalizes/displays adequate comfort level or patient's stated pain goal  Description: INTERVENTIONS:  - Encourage pt to monitor pain and request assistance  - Assess pain using appropriate pain scale  - Administer analgesics based on type and severity of pain and evaluate response  - Implement non-pharmacological measures as appropriate and evaluate response  - Consider cultural and social influences on pain and pain management  - Manage/alleviate anxiety  - Utilize distraction and/or relaxation techniques  - Monitor for opioid side effects  - Notify MD/LIP if interventions unsuccessful or patient reports new pain  - Anticipate increased pain with activity and pre-medicate as appropriate  Outcome: Progressing     Problem: DISCHARGE PLANNING  Goal: Discharge to home or other facility with appropriate resources  Description: INTERVENTIONS:  - Identify barriers to discharge w/pt and caregiver  - Include patient/family/discharge partner in discharge planning  - Arrange for needed discharge resources and transportation as appropriate  - Identify discharge learning needs (meds, wound care, etc)  - Arrange for interpreters to assist at discharge as needed  - Consider post-discharge preferences of patient/family/discharge partner  - Complete POLST form as appropriate  - Assess patient's ability to be responsible for managing their own health  - Refer to Case Management Department for coordinating discharge planning if the patient needs post-hospital services based on physician/LIP order or complex needs related to functional status, cognitive ability or social support system  Outcome: Progressing details…

## 2023-10-04 ENCOUNTER — APPOINTMENT (OUTPATIENT)
Dept: ELECTROPHYSIOLOGY | Facility: CLINIC | Age: 78
End: 2023-10-04
Payer: MEDICARE

## 2023-10-04 ENCOUNTER — NON-APPOINTMENT (OUTPATIENT)
Age: 78
End: 2023-10-04

## 2023-10-04 PROCEDURE — 93295 DEV INTERROG REMOTE 1/2/MLT: CPT

## 2023-10-04 PROCEDURE — 93296 REM INTERROG EVL PM/IDS: CPT

## 2023-10-26 RX ORDER — SPIRONOLACTONE 25 MG/1
25 TABLET ORAL DAILY
Qty: 90 | Refills: 3 | Status: ACTIVE | COMMUNITY
Start: 1900-01-01 | End: 1900-01-01

## 2023-11-08 ENCOUNTER — NON-APPOINTMENT (OUTPATIENT)
Age: 78
End: 2023-11-08

## 2023-11-09 ENCOUNTER — TRANSCRIPTION ENCOUNTER (OUTPATIENT)
Age: 78
End: 2023-11-09

## 2024-01-04 ENCOUNTER — APPOINTMENT (OUTPATIENT)
Dept: ELECTROPHYSIOLOGY | Facility: CLINIC | Age: 79
End: 2024-01-04
Payer: MEDICARE

## 2024-01-04 ENCOUNTER — NON-APPOINTMENT (OUTPATIENT)
Age: 79
End: 2024-01-04

## 2024-01-04 PROCEDURE — 93295 DEV INTERROG REMOTE 1/2/MLT: CPT

## 2024-01-04 PROCEDURE — 93296 REM INTERROG EVL PM/IDS: CPT

## 2024-01-30 RX ORDER — CLOPIDOGREL BISULFATE 75 MG/1
75 TABLET, FILM COATED ORAL DAILY
Qty: 90 | Refills: 3 | Status: ACTIVE | COMMUNITY
Start: 2024-01-30 | End: 1900-01-01

## 2024-02-06 RX ORDER — PANTOPRAZOLE 40 MG/1
40 TABLET, DELAYED RELEASE ORAL
Qty: 90 | Refills: 3 | Status: ACTIVE | COMMUNITY
Start: 1900-01-01 | End: 1900-01-01

## 2024-03-01 ENCOUNTER — TRANSCRIPTION ENCOUNTER (OUTPATIENT)
Age: 79
End: 2024-03-01

## 2024-03-01 RX ORDER — ATORVASTATIN CALCIUM 40 MG/1
40 TABLET, FILM COATED ORAL
Qty: 90 | Refills: 3 | Status: ACTIVE | COMMUNITY
Start: 1900-01-01 | End: 1900-01-01

## 2024-04-03 ENCOUNTER — NON-APPOINTMENT (OUTPATIENT)
Age: 79
End: 2024-04-03

## 2024-04-04 ENCOUNTER — APPOINTMENT (OUTPATIENT)
Dept: ELECTROPHYSIOLOGY | Facility: CLINIC | Age: 79
End: 2024-04-04
Payer: MEDICARE

## 2024-04-04 PROCEDURE — 93296 REM INTERROG EVL PM/IDS: CPT

## 2024-04-04 PROCEDURE — 93295 DEV INTERROG REMOTE 1/2/MLT: CPT

## 2024-04-16 RX ORDER — FUROSEMIDE 40 MG/1
40 TABLET ORAL
Qty: 90 | Refills: 3 | Status: ACTIVE | COMMUNITY
Start: 1900-01-01 | End: 1900-01-01

## 2024-04-22 RX ORDER — LISINOPRIL 2.5 MG/1
2.5 TABLET ORAL DAILY
Qty: 90 | Refills: 3 | Status: ACTIVE | COMMUNITY
Start: 2022-05-06 | End: 1900-01-01

## 2024-05-13 ENCOUNTER — TRANSCRIPTION ENCOUNTER (OUTPATIENT)
Age: 79
End: 2024-05-13

## 2024-07-04 ENCOUNTER — NON-APPOINTMENT (OUTPATIENT)
Age: 79
End: 2024-07-04

## 2024-07-05 ENCOUNTER — APPOINTMENT (OUTPATIENT)
Dept: ELECTROPHYSIOLOGY | Facility: CLINIC | Age: 79
End: 2024-07-05

## 2024-07-05 PROCEDURE — 93295 DEV INTERROG REMOTE 1/2/MLT: CPT

## 2024-07-05 PROCEDURE — 93296 REM INTERROG EVL PM/IDS: CPT

## 2024-10-04 ENCOUNTER — APPOINTMENT (OUTPATIENT)
Dept: ELECTROPHYSIOLOGY | Facility: CLINIC | Age: 79
End: 2024-10-04
Payer: MEDICARE

## 2024-10-04 ENCOUNTER — NON-APPOINTMENT (OUTPATIENT)
Age: 79
End: 2024-10-04

## 2024-10-04 PROCEDURE — 93295 DEV INTERROG REMOTE 1/2/MLT: CPT

## 2024-10-04 PROCEDURE — 93296 REM INTERROG EVL PM/IDS: CPT

## 2025-01-03 ENCOUNTER — APPOINTMENT (OUTPATIENT)
Dept: ELECTROPHYSIOLOGY | Facility: CLINIC | Age: 80
End: 2025-01-03
Payer: MEDICARE

## 2025-01-03 ENCOUNTER — NON-APPOINTMENT (OUTPATIENT)
Age: 80
End: 2025-01-03

## 2025-01-03 PROCEDURE — 93296 REM INTERROG EVL PM/IDS: CPT

## 2025-01-03 PROCEDURE — 93295 DEV INTERROG REMOTE 1/2/MLT: CPT

## 2025-01-22 ENCOUNTER — RX RENEWAL (OUTPATIENT)
Age: 80
End: 2025-01-22

## 2025-02-24 ENCOUNTER — RX RENEWAL (OUTPATIENT)
Age: 80
End: 2025-02-24

## 2025-04-03 ENCOUNTER — APPOINTMENT (OUTPATIENT)
Dept: CARDIOLOGY | Facility: CLINIC | Age: 80
End: 2025-04-03

## 2025-04-04 ENCOUNTER — NON-APPOINTMENT (OUTPATIENT)
Age: 80
End: 2025-04-04

## 2025-04-04 ENCOUNTER — APPOINTMENT (OUTPATIENT)
Dept: ELECTROPHYSIOLOGY | Facility: CLINIC | Age: 80
End: 2025-04-04

## 2025-04-04 PROCEDURE — 93296 REM INTERROG EVL PM/IDS: CPT

## 2025-04-04 PROCEDURE — 93295 DEV INTERROG REMOTE 1/2/MLT: CPT

## 2025-05-17 ENCOUNTER — RX RENEWAL (OUTPATIENT)
Age: 80
End: 2025-05-17

## 2025-06-02 ENCOUNTER — TRANSCRIPTION ENCOUNTER (OUTPATIENT)
Age: 80
End: 2025-06-02

## 2025-06-04 ENCOUNTER — APPOINTMENT (OUTPATIENT)
Dept: CARDIOLOGY | Facility: CLINIC | Age: 80
End: 2025-06-04

## 2025-06-09 NOTE — ED PROVIDER NOTE - CRITERIA ADMIT PEDS PT
Refill requested:    Name from pharmacy: ICOSAPENT ETHYL 1 GRAM CAPSULE         Will file in chart as: Icosapent Ethyl 1 g Cap    Sig: TAKE 1 CAPSULE BY MOUTH TWICE A DAY    Disp: 180 capsule    Refills: 1    Start: 6/8/2025    Class: Eprescribe    Non-formulary    Last ordered: 2 months ago (3/12/2025) by Krysta Wagoner MD    Last refill: 3/12/2025    Rx #: [id number]    Antilipemic Agents Refill Protocol - 12 Month Protocol [id number]/2025 06:57 AM   Protocol Details Lipid Panel resulted within last 12 months-- IF CRITERIA FAILED REFER TO PROTOCOL DETAILS    Seen by prescribing provider or same department within the last 12 months or has a future appt in 3 months - IF FAILED PLEASE LOOK AT CHART REVIEW FOR LAST VISIT AND PROCEED ACCORDINGLY    Medication (including dose and sig) on current med list      To be filled at: [id number] - [place], [place] - [address] [facility]       Last office visit: 8/27/204 For: infected epithelial inclusion cyst  PCP: Dr. Wagoner    Upcoming appt: 6/12/2025    Medication can not be filled by protocol. Physician authorization required.  Please advise on refills.  
No

## 2025-07-08 ENCOUNTER — APPOINTMENT (OUTPATIENT)
Dept: ELECTROPHYSIOLOGY | Facility: CLINIC | Age: 80
End: 2025-07-08
Payer: MEDICARE

## 2025-07-08 ENCOUNTER — NON-APPOINTMENT (OUTPATIENT)
Age: 80
End: 2025-07-08

## 2025-07-08 PROCEDURE — 93296 REM INTERROG EVL PM/IDS: CPT

## 2025-07-08 PROCEDURE — 93295 DEV INTERROG REMOTE 1/2/MLT: CPT
